# Patient Record
Sex: FEMALE | Race: WHITE | NOT HISPANIC OR LATINO | Employment: FULL TIME | URBAN - METROPOLITAN AREA
[De-identification: names, ages, dates, MRNs, and addresses within clinical notes are randomized per-mention and may not be internally consistent; named-entity substitution may affect disease eponyms.]

---

## 2017-02-01 ENCOUNTER — ALLSCRIPTS OFFICE VISIT (OUTPATIENT)
Dept: OTHER | Facility: OTHER | Age: 47
End: 2017-02-01

## 2017-02-01 LAB — S PYO AG THROAT QL: POSITIVE

## 2017-02-10 ENCOUNTER — GENERIC CONVERSION - ENCOUNTER (OUTPATIENT)
Dept: OTHER | Facility: OTHER | Age: 47
End: 2017-02-10

## 2017-04-24 ENCOUNTER — ALLSCRIPTS OFFICE VISIT (OUTPATIENT)
Dept: OTHER | Facility: OTHER | Age: 47
End: 2017-04-24

## 2017-04-24 LAB — S PYO AG THROAT QL: NEGATIVE

## 2017-04-30 ENCOUNTER — GENERIC CONVERSION - ENCOUNTER (OUTPATIENT)
Dept: OTHER | Facility: OTHER | Age: 47
End: 2017-04-30

## 2017-11-28 ENCOUNTER — GENERIC CONVERSION - ENCOUNTER (OUTPATIENT)
Dept: OTHER | Facility: OTHER | Age: 47
End: 2017-11-28

## 2018-01-10 NOTE — MISCELLANEOUS
Message   Recorded as Task   Date: 11/28/2017 03:19 PM, Created By: Ashwini Tripathi   Task Name: Medical Complaint Callback   Assigned To: [facility]   Regarding Patient: Preeti Alonso, Status: Active   CommentMahusseingiovanna Sevilla - 28 Nov 2017 3:19 PM     TASK CREATED  Caller: Self; [phone] (Home); [phone] (Work)  PT REPORTS YESTERDAY A WOKE WITH LEFT EYE BLOODSHOT NO HEADACHE NO FEVER FEELS DISCOMFORT NO PRESSURE   TAKING HER ALLEGY [address] SHE SEE AN EYE DOCTOR OR COME TO SEE YOU    PLEASE ADVISE   Zhen Woods - 28 Nov 2017 4:21 PM     TASK REPLIED TO: Previously Assigned To [address] like a subconjunctival hemorrhage (broken blood vessel in eye), but because the eye is uncomfortable recommend she f/u with eye doctor to be sure  Jerilyn Palmer - 28 Nov 2017 4:27 PM     TASK REPLIED TO: Previously Assigned To [facility]  Pt aware        Active Problems    1  Acute pharyngitis, unspecified etiology (462) (J02 9)   2  Acute right-sided low back pain without sciatica (724 2) (M54 5)   3  ADD (attention deficit disorder) (314 00) (F98 8)   4  Allergic rhinitis (477 9) (J30 9)   5  Colonoscopy (Fiberoptic) Screening   6  Elevated serum alkaline phosphatase level (790 5) (R74 8)   7  Encounter for screening mammogram for malignant neoplasm of breast (V76 12)   (Z12 31)   8  Hemochromatosis (275 03) (E83 119)   9  Strep pharyngitis (034 0) (J02 0)   10  Strep tonsillitis (034 0) (J03 00)   11  Vitamin D deficiency (268 9) (E55 9)    Current Meds   1  Amoxicillin 500 MG Oral Capsule; TAKE 2 CAPSULES TWICE DAILY UNTIL GONE;   Therapy: 97Ofu0680 to (Evaluate:57Ojg2061)  Requested for: 30Apr2017; Last   Rx:30Apr2017 Ordered   2  Amphetamine-Dextroamphet ER 30 MG Oral Capsule Extended Release 24 Hour; TAKE   1 CAPSULE Daily; Therapy: [id number] to (Evaluate:99Emp7386); Last Rx:21Nov2017 Ordered   3   Fluticasone Propionate 50 MCG/ACT Nasal Suspension; INHALE 2 SPRAYS IN EACH   NOSTRIL AT BEDTIME; Therapy: 82FGT0019 to (Evaluate:19Jun2016)  Requested for: 75Mzs9478; Last   Rx:16Kps7947; Status: ACTIVE - Renewal Denied Ordered   4  Vitamin D 2000 UNIT Oral Tablet; TAKE 1 CAPSULE Daily Recorded    Allergies    1  No Known Drug Allergies    Signatures   Electronically signed by :  NATHALIE Farmer; Nov 28 2017  4:31PM EST                       (Author)

## 2018-01-12 VITALS
DIASTOLIC BLOOD PRESSURE: 74 MMHG | HEIGHT: 72 IN | WEIGHT: 231 LBS | SYSTOLIC BLOOD PRESSURE: 126 MMHG | HEART RATE: 84 BPM | TEMPERATURE: 97.8 F | BODY MASS INDEX: 31.29 KG/M2

## 2018-01-13 VITALS
DIASTOLIC BLOOD PRESSURE: 70 MMHG | SYSTOLIC BLOOD PRESSURE: 124 MMHG | TEMPERATURE: 97.3 F | BODY MASS INDEX: 31.64 KG/M2 | HEIGHT: 72 IN | HEART RATE: 80 BPM | WEIGHT: 233.6 LBS

## 2018-01-14 NOTE — RESULT NOTES
Verified Results  (Q) CULTURE, THROAT, SPECIAL W/GRP A STREP SUSCEPT  [id number] 12:00AM Leann Patel     Test Name Result Flag Reference   CULTURE, THROAT, SPECIAL$W/GRP A STREP SUSCEPT  A    CULTURE, THROAT, SPECIAL W/GRP A STREP SUSCEPT  MICRO NUMBER:      [id number]    TEST STATUS:       FINAL    SPECIMEN SOURCE:   THROAT    SPECIMEN QUALITY:  ADEQUATE    RESULT:            Heavy growth of Group A Streptococcus isolated    COMMENT:           Normal oropharyngeal edmond also present                              Group A Strep                            ----------------                            INT   CHARMAINE     CEFEPIME               S          CEFOTAXIME             S          CEFTRIAXONE            S          CLINDAMYCIN            S          ERYTHROMYCIN           S          LEVOFLOXACIN           S          PENICILLIN             S          VANCOMYCIN             S       S=Susceptible  I=Intermediate  R=Resistant  * = Not Tested  NR = Not Reported  **NN = See Therapy Comments       Plan  Strep pharyngitis    · Start: Amoxicillin 500 MG Oral Capsule; TAKE 2 CAPSULES TWICE DAILY UNTIL GONE

## 2018-02-06 ENCOUNTER — OFFICE VISIT (OUTPATIENT)
Dept: URGENT CARE | Facility: CLINIC | Age: 48
End: 2018-02-06
Payer: COMMERCIAL

## 2018-02-06 VITALS
DIASTOLIC BLOOD PRESSURE: 70 MMHG | RESPIRATION RATE: 16 BRPM | TEMPERATURE: 97.4 F | WEIGHT: 249 LBS | BODY MASS INDEX: 33.72 KG/M2 | HEIGHT: 72 IN | HEART RATE: 74 BPM | SYSTOLIC BLOOD PRESSURE: 142 MMHG | OXYGEN SATURATION: 98 %

## 2018-02-06 DIAGNOSIS — R68.89 FLU-LIKE SYMPTOMS: Primary | ICD-10-CM

## 2018-02-06 PROCEDURE — G0382 LEV 3 HOSP TYPE B ED VISIT: HCPCS | Performed by: FAMILY MEDICINE

## 2018-02-06 PROCEDURE — 87798 DETECT AGENT NOS DNA AMP: CPT | Performed by: FAMILY MEDICINE

## 2018-02-06 RX ORDER — DEXTROAMPHETAMINE SACCHARATE, AMPHETAMINE ASPARTATE MONOHYDRATE, DEXTROAMPHETAMINE SULFATE AND AMPHETAMINE SULFATE 7.5; 7.5; 7.5; 7.5 MG/1; MG/1; MG/1; MG/1
1 CAPSULE, EXTENDED RELEASE ORAL DAILY
COMMUNITY
Start: 2014-07-23 | End: 2018-02-20 | Stop reason: SDUPTHER

## 2018-02-06 RX ORDER — FLUTICASONE PROPIONATE 50 MCG
SPRAY, SUSPENSION (ML) NASAL
COMMUNITY
Start: 2013-01-14 | End: 2018-12-03 | Stop reason: ALTCHOICE

## 2018-02-06 RX ORDER — MULTIVIT-MIN/IRON/FOLIC ACID/K 18-600-40
1 CAPSULE ORAL DAILY
COMMUNITY

## 2018-02-06 RX ORDER — OSELTAMIVIR PHOSPHATE 75 MG/1
75 CAPSULE ORAL EVERY 12 HOURS SCHEDULED
Qty: 10 CAPSULE | Refills: 0 | Status: SHIPPED | OUTPATIENT
Start: 2018-02-06 | End: 2018-02-11

## 2018-02-06 NOTE — PROGRESS NOTES
Assessment/Plan:    No problem-specific Assessment & Plan notes found for this encounter  Diagnoses and all orders for this visit:    Flu-like symptoms  -     Influenza culture  -     oseltamivir (TAMIFLU) 75 mg capsule; Take 1 capsule (75 mg total) by mouth every 12 (twelve) hours for 5 days    Other orders  -     amphetamine-dextroamphetamine (ADDERALL XR) 30 MG 24 hr capsule; Take 1 capsule by mouth daily  -     fluticasone (FLONASE) 50 mcg/act nasal spray; into each nostril  -     Cholecalciferol (VITAMIN D) 2000 units CAPS; Take 1 capsule by mouth daily          Subjective:      Patient ID: Gerhardt Hoe is a 51 y o  female  Patient presents with 24 hours of sinus congestion, dry cough, sore throat, progressive body aches  She denies fevers, she does have chills  Several coworkers and her boss were diagnosed with the flu  She did not receive a flu vaccination this year  She denies chest tightness shortness of breath or wheezing        The following portions of the patient's history were reviewed and updated as appropriate: current medications, past family history, past medical history, past social history, past surgical history and problem list     Review of Systems   Constitutional: Positive for chills and fatigue  Negative for fever  HENT: Positive for congestion, rhinorrhea and sore throat  Eyes: Negative  Respiratory: Positive for cough  Negative for chest tightness, shortness of breath and wheezing  Cardiovascular: Negative  Musculoskeletal: Positive for myalgias  Objective:     Physical Exam   Constitutional: She appears well-developed and well-nourished  HENT:   Head: Normocephalic  Right Ear: External ear normal    Left Ear: External ear normal    Mouth/Throat: Oropharynx is clear and moist  No oropharyngeal exudate  Mucosas erythema, edema and rhinorrhea, PND  Eyes: Conjunctivae are normal    Neck: Neck supple     Cardiovascular: Normal rate, regular rhythm and normal heart sounds  Pulmonary/Chest: Effort normal and breath sounds normal  No respiratory distress  She has no wheezes  Lymphadenopathy:     She has no cervical adenopathy

## 2018-02-06 NOTE — LETTER
February 6, 2018     Patient: Linda May   YOB: 1970   Date of Visit: 2/6/2018       To Whom it May Concern:    Florentino Cazares was seen in my clinic on 2/6/2018  She may return to work on 2/8/2018  If you have any questions or concerns, please don't hesitate to call           Sincerely,          Jamal Ravi DO        CC: No Recipients

## 2018-02-06 NOTE — PATIENT INSTRUCTIONS
Empiric Tamiflu pending influenza culture results  Tylenol or Motrin as needed for temperatures comfort  Encourage fluids, humidifier use

## 2018-02-07 ENCOUNTER — TELEPHONE (OUTPATIENT)
Dept: URGENT CARE | Facility: CLINIC | Age: 48
End: 2018-02-07

## 2018-02-07 LAB
FLUAV AG SPEC QL: NORMAL
FLUBV AG SPEC QL: NORMAL
RSV B RNA SPEC QL NAA+PROBE: NORMAL

## 2018-02-20 DIAGNOSIS — F98.8 ATTENTION DEFICIT DISORDER (ADD) WITHOUT HYPERACTIVITY: Primary | ICD-10-CM

## 2018-02-20 RX ORDER — DEXTROAMPHETAMINE SACCHARATE, AMPHETAMINE ASPARTATE MONOHYDRATE, DEXTROAMPHETAMINE SULFATE AND AMPHETAMINE SULFATE 7.5; 7.5; 7.5; 7.5 MG/1; MG/1; MG/1; MG/1
30 CAPSULE, EXTENDED RELEASE ORAL DAILY
Qty: 30 CAPSULE | Refills: 0 | Status: SHIPPED | OUTPATIENT
Start: 2018-02-20 | End: 2018-03-20 | Stop reason: SDUPTHER

## 2018-03-20 DIAGNOSIS — F98.8 ATTENTION DEFICIT DISORDER (ADD) WITHOUT HYPERACTIVITY: ICD-10-CM

## 2018-03-20 RX ORDER — DEXTROAMPHETAMINE SACCHARATE, AMPHETAMINE ASPARTATE MONOHYDRATE, DEXTROAMPHETAMINE SULFATE AND AMPHETAMINE SULFATE 7.5; 7.5; 7.5; 7.5 MG/1; MG/1; MG/1; MG/1
30 CAPSULE, EXTENDED RELEASE ORAL DAILY
Qty: 30 CAPSULE | Refills: 0 | Status: SHIPPED | OUTPATIENT
Start: 2018-03-20 | End: 2018-04-20 | Stop reason: SDUPTHER

## 2018-04-05 ENCOUNTER — OFFICE VISIT (OUTPATIENT)
Dept: FAMILY MEDICINE CLINIC | Facility: HOSPITAL | Age: 48
End: 2018-04-05
Payer: COMMERCIAL

## 2018-04-05 VITALS
BODY MASS INDEX: 33.7 KG/M2 | WEIGHT: 248.8 LBS | HEIGHT: 72 IN | HEART RATE: 78 BPM | TEMPERATURE: 97.5 F | SYSTOLIC BLOOD PRESSURE: 126 MMHG | DIASTOLIC BLOOD PRESSURE: 78 MMHG

## 2018-04-05 DIAGNOSIS — R63.5 WEIGHT GAIN: ICD-10-CM

## 2018-04-05 DIAGNOSIS — Z00.00 WELL ADULT EXAM: ICD-10-CM

## 2018-04-05 DIAGNOSIS — Z12.31 ENCOUNTER FOR SCREENING MAMMOGRAM FOR BREAST CANCER: Primary | ICD-10-CM

## 2018-04-05 DIAGNOSIS — Z13.29 SCREENING FOR ENDOCRINE, METABOLIC AND IMMUNITY DISORDER: ICD-10-CM

## 2018-04-05 DIAGNOSIS — Z13.220 SCREENING CHOLESTEROL LEVEL: ICD-10-CM

## 2018-04-05 DIAGNOSIS — Z13.0 SCREENING FOR ENDOCRINE, METABOLIC AND IMMUNITY DISORDER: ICD-10-CM

## 2018-04-05 DIAGNOSIS — Z13.228 SCREENING FOR ENDOCRINE, METABOLIC AND IMMUNITY DISORDER: ICD-10-CM

## 2018-04-05 DIAGNOSIS — E83.118 OTHER HEMOCHROMATOSIS: ICD-10-CM

## 2018-04-05 PROCEDURE — 99396 PREV VISIT EST AGE 40-64: CPT | Performed by: NURSE PRACTITIONER

## 2018-04-05 NOTE — PROGRESS NOTES
Subjective     Geraldine Martinez is a 51 y o   female and is here for routine health maintenance  The patient reports problems - Having issues with gaining weight  Discussed with GYN that she has only had 2 periods in the last year and when she does get it she will gain 10 pounds that she can then not take off  Reports a well balanced diet and exercsie almost daily  Walks and active with grandchildren  Fredrick Francis Has hemochromatosis  Was previously managed by hematologist in [facility] but he is no longer there  Requesting name of new hematologist      Cancer Screening  Colononoscopy 2016  Repeat 5 years  Mammogram 2017  Has script  Pap 2017  Abnormal pap? No    CVD Screening  Diet well balanced  Smoking No  Hypertension No  Dyslipidemia No  Obesity No  Physical activity daily   Diabetes mellitus No    Immunizations  Influenza No  Prevnar N/A  Pneumovax N/A  TDAP 2013    Immunization History   Administered Date(s) Administered    Influenza TIV (IM) 10/12/2015    Tdap 08/01/2013        History:  LMP: 2/2018    Dentist Visit within 6-12 months      The following portions of the patient's history were reviewed and updated as appropriate: allergies, current medications, past family history, past medical history, past social history, past surgical history and problem list     HPI    Review of Systems  Review of Systems   Constitutional: Positive for unexpected weight change  Negative for chills, fatigue and fever  HENT: Negative for congestion, dental problem, ear pain, hearing loss, postnasal drip, rhinorrhea, sinus pain, sinus pressure, sore throat, tinnitus and trouble swallowing  Eyes: Negative  Respiratory: Negative  Cardiovascular: Negative  Gastrointestinal: Positive for constipation and diarrhea  Genitourinary: Negative  Musculoskeletal: Negative  Skin: Negative  Allergic/Immunologic: Negative  Neurological: Negative  Psychiatric/Behavioral: Negative            Objective Vitals:    04/05/18 0842   BP: 126/78   Pulse: 78   Temp: 97 5 °F (36 4 °C)     Physical Exam   Constitutional: She is oriented to person, place, and time  She appears well-developed and well-nourished  HENT:   Head: Normocephalic and atraumatic  Right Ear: External ear normal    Left Ear: External ear normal    Nose: Nose normal    Mouth/Throat: Oropharynx is clear and moist    Eyes: Conjunctivae are normal  Pupils are equal, round, and reactive to light  Neck: Normal range of motion  Neck supple  No thyromegaly present  Cardiovascular: Normal rate, regular rhythm, normal heart sounds and intact distal pulses  Pulmonary/Chest: Effort normal and breath sounds normal    Abdominal: Soft  Bowel sounds are normal    Musculoskeletal: Normal range of motion  Neurological: She is alert and oriented to person, place, and time  Skin: Skin is warm and dry  Capillary refill takes less than 2 seconds  Psychiatric: She has a normal mood and affect  Vitals reviewed  Assessment/Plan     Healthy female exam     1  Resources for weight loss given  2  Patient Counseling:  --Nutrition: Stressed importance of moderation in sodium/caffeine intake, saturated fat and cholesterol, caloric balance, sufficient intake of fresh fruits, vegetables, fiber, calcium, iron  --Exercise: Stressed the importance of regular exercise  --Injury prevention: Discussed safety belts, safety helmets, smoke detector, smoking near bedding or upholstery  --Dental health: Discussed importance of regular tooth brushing, flossing, and dental visits  --Immunizations reviewed  --Discussed benefits of screening colonoscopy  3  Cancer Screening Colonoscopy every 5 years  Pap every 3 years  mammo every year  4  Labs: Screening labs and will also check iron levels  5  Recommend dermatologist for skin check yearly  6  Follow up next physical in 1 year

## 2018-04-07 LAB
ALBUMIN SERPL-MCNC: 4.1 G/DL (ref 3.6–5.1)
ALBUMIN/GLOB SERPL: 1.5 (CALC) (ref 1–2.5)
ALP SERPL-CCNC: 118 U/L (ref 33–115)
ALT SERPL-CCNC: 16 U/L (ref 6–29)
AST SERPL-CCNC: 16 U/L (ref 10–35)
BASOPHILS # BLD AUTO: 21 CELLS/UL (ref 0–200)
BASOPHILS NFR BLD AUTO: 0.4 %
BILIRUB SERPL-MCNC: 1 MG/DL (ref 0.2–1.2)
BUN SERPL-MCNC: 14 MG/DL (ref 7–25)
BUN/CREAT SERPL: ABNORMAL (CALC) (ref 6–22)
CALCIUM SERPL-MCNC: 9.2 MG/DL (ref 8.6–10.2)
CHLORIDE SERPL-SCNC: 106 MMOL/L (ref 98–110)
CHOLEST SERPL-MCNC: 148 MG/DL
CHOLEST/HDLC SERPL: 2.6 (CALC)
CO2 SERPL-SCNC: 29 MMOL/L (ref 20–31)
CREAT SERPL-MCNC: 0.68 MG/DL (ref 0.5–1.1)
EOSINOPHIL # BLD AUTO: 133 CELLS/UL (ref 15–500)
EOSINOPHIL NFR BLD AUTO: 2.5 %
ERYTHROCYTE [DISTWIDTH] IN BLOOD BY AUTOMATED COUNT: 11.8 % (ref 11–15)
FERRITIN SERPL-MCNC: 107 NG/ML (ref 10–232)
GLOBULIN SER CALC-MCNC: 2.8 G/DL (CALC) (ref 1.9–3.7)
GLUCOSE SERPL-MCNC: 94 MG/DL (ref 65–99)
HCT VFR BLD AUTO: 44.9 % (ref 35–45)
HDLC SERPL-MCNC: 57 MG/DL
HGB BLD-MCNC: 15.2 G/DL (ref 11.7–15.5)
IRON SERPL-MCNC: 154 MCG/DL (ref 40–190)
LDLC SERPL CALC-MCNC: 75 MG/DL (CALC)
LYMPHOCYTES # BLD AUTO: 1977 CELLS/UL (ref 850–3900)
LYMPHOCYTES NFR BLD AUTO: 37.3 %
MCH RBC QN AUTO: 32.3 PG (ref 27–33)
MCHC RBC AUTO-ENTMCNC: 33.9 G/DL (ref 32–36)
MCV RBC AUTO: 95.3 FL (ref 80–100)
MONOCYTES # BLD AUTO: 382 CELLS/UL (ref 200–950)
MONOCYTES NFR BLD AUTO: 7.2 %
NEUTROPHILS # BLD AUTO: 2788 CELLS/UL (ref 1500–7800)
NEUTROPHILS NFR BLD AUTO: 52.6 %
NONHDLC SERPL-MCNC: 91 MG/DL (CALC)
PLATELET # BLD AUTO: 200 THOUSAND/UL (ref 140–400)
PMV BLD REES-ECKER: 10.4 FL (ref 7.5–12.5)
POTASSIUM SERPL-SCNC: 4.1 MMOL/L (ref 3.5–5.3)
PROT SERPL-MCNC: 6.9 G/DL (ref 6.1–8.1)
RBC # BLD AUTO: 4.71 MILLION/UL (ref 3.8–5.1)
SL AMB EGFR AFRICAN AMERICAN: 119 ML/MIN/1.73M2
SL AMB EGFR NON AFRICAN AMERICAN: 103 ML/MIN/1.73M2
SODIUM SERPL-SCNC: 139 MMOL/L (ref 135–146)
TRIGL SERPL-MCNC: 75 MG/DL
TSH SERPL-ACNC: 1.61 MIU/L
WBC # BLD AUTO: 5.3 THOUSAND/UL (ref 3.8–10.8)

## 2018-04-20 DIAGNOSIS — F98.8 ATTENTION DEFICIT DISORDER (ADD) WITHOUT HYPERACTIVITY: ICD-10-CM

## 2018-04-20 RX ORDER — DEXTROAMPHETAMINE SACCHARATE, AMPHETAMINE ASPARTATE MONOHYDRATE, DEXTROAMPHETAMINE SULFATE AND AMPHETAMINE SULFATE 7.5; 7.5; 7.5; 7.5 MG/1; MG/1; MG/1; MG/1
30 CAPSULE, EXTENDED RELEASE ORAL DAILY
Qty: 30 CAPSULE | Refills: 0 | Status: SHIPPED | OUTPATIENT
Start: 2018-04-20 | End: 2018-05-22 | Stop reason: SDUPTHER

## 2018-05-09 ENCOUNTER — OFFICE VISIT (OUTPATIENT)
Dept: HEMATOLOGY ONCOLOGY | Facility: HOSPITAL | Age: 48
End: 2018-05-09
Payer: COMMERCIAL

## 2018-05-09 VITALS
BODY MASS INDEX: 34.86 KG/M2 | OXYGEN SATURATION: 98 % | TEMPERATURE: 98.4 F | HEIGHT: 71 IN | DIASTOLIC BLOOD PRESSURE: 88 MMHG | SYSTOLIC BLOOD PRESSURE: 126 MMHG | RESPIRATION RATE: 16 BRPM | WEIGHT: 249 LBS | HEART RATE: 74 BPM

## 2018-05-09 DIAGNOSIS — E83.118 OTHER HEMOCHROMATOSIS: Primary | ICD-10-CM

## 2018-05-09 PROCEDURE — 99205 OFFICE O/P NEW HI 60 MIN: CPT | Performed by: INTERNAL MEDICINE

## 2018-05-09 RX ORDER — CETIRIZINE HYDROCHLORIDE 10 MG/1
10 TABLET ORAL DAILY
COMMUNITY

## 2018-05-09 NOTE — LETTER
May 9, 2018     MD Danuta Soni  [address]  [address] [address] [address]    Patient: Jakob Caruso   YOB: 1970   Date of Visit: 5/9/2018       Dear Dr Narendra Bryan: Thank you for referring Flor Chapman to me for evaluation  Below are my notes for this consultation  If you have questions, please do not hesitate to call me  I look forward to following your patient along with you  Sincerely,        Shorty Toro MD        CC: No Recipients  Shorty Toro MD  5/9/2018  3:03 PM  Sign at close encounter     Oncology Outpatient Consult Note  Jakob Caruso 51 y o  female MRN: @ Encounter: [id number]        Date:  5/9/2018        CC:  History of hemochromatosis      HPI:  Jakob Caruso is seen for initial consultation 5/9/2018 regarding Hemochromatosis  The patient rarely has a family history of hemochromatosis and was tested and proven to have it  She was on a phlebotomy schedule but then was slowly weaned down where she was only giving occasionally  She was seeing a hematologist every other year  She states Her children were tested and are also positive  Her sister and her brother both are also positive  She has not been getting phlebotomies for a while now  Her last hematologist moved so she decided to come to see us  As far symptoms of discharge is at baseline  Denies any nausea denies any vomiting and is in shortness of breath  The rest of her 14 point review of systems today was negative  She has never had an MRI of her liver so I will arrange this  Previous Hematologic/ Oncologic History:    Phlebotomy    Test Results:    Imaging: No results found      Labs:   Lab Results   Component Value Date    WBC 5 29 06/04/2014    HGB 15 2 04/06/2018    HCT 44 9 04/06/2018    MCV 95 3 04/06/2018     04/06/2018     Lab Results   Component Value Date     06/04/2014    K 4 1 06/04/2014     06/04/2014    CO2 31 06/04/2014    ANIONGAP 1 (L) 06/04/2014 BUN 14 04/06/2018    CREATININE 0 68 04/06/2018    GLUCOSE 89 06/04/2014    CALCIUM 9 2 04/06/2018    AST 18 06/04/2014    ALT 21 06/04/2014    ALKPHOS 126 06/04/2014    PROT 6 9 06/04/2014    BILITOT 1 0 06/04/2014           Lab Results   Component Value Date    IRON 154 04/06/2018    TIBC 234 (L) 06/04/2014    FERRITIN 107 04/06/2018         ROS: As stated in history of present illness otherwise her 14 point review of systems today was negative  Active Problems:   Patient Active Problem List   Diagnosis    ADD (attention deficit disorder)    Allergic rhinitis    Other hemochromatosis       Past Medical History:   Past Medical History:   Diagnosis Date    Hemochromatosis     IBS (irritable bowel syndrome)        Surgical History:   Past Surgical History:   Procedure Laterality Date    COLONOSCOPY      resolved 02/13;  repeat due in 10 years        Family History:  No family history on file  Cancer-related family history is not on file  Social History:   Social History     Social History    Marital status: /Civil Union     Spouse name: N/A    Number of children: N/A    Years of education: N/A     Occupational History    Not on file       Social History Main Topics    Smoking status: Former Smoker    Smokeless tobacco: Never Used    Alcohol use No    Drug use: No    Sexual activity: Not on file     Other Topics Concern    Not on file     Social History Narrative    No narrative on file       Current Medications:   Current Outpatient Prescriptions   Medication Sig Dispense Refill    amphetamine-dextroamphetamine (ADDERALL XR) 30 MG 24 hr capsule Take 1 capsule (30 mg total) by mouth daily Max Daily Amount: 30 mg 30 capsule 0    cetirizine (ZyrTEC) 10 mg tablet Take 10 mg by mouth daily      Cholecalciferol (VITAMIN D) 2000 units CAPS Take 1 capsule by mouth daily      fluticasone (FLONASE) 50 mcg/act nasal spray into each nostril       No current facility-administered medications for this visit  Allergies: No Known Allergies      Physical Exam:    Body surface area is 2 32 meters squared  Wt Readings from Last 3 Encounters:   05/09/18 113 kg (249 lb)   04/05/18 113 kg (248 lb 12 8 oz)   02/06/18 113 kg (249 lb)        Temp Readings from Last 3 Encounters:   05/09/18 98 4 °F (36 9 °C) (Tympanic)   04/05/18 97 5 °F (36 4 °C) (Tympanic)   02/06/18 (!) 97 4 °F (36 3 °C)        BP Readings from Last 3 Encounters:   05/09/18 126/88   04/05/18 126/78   02/06/18 142/70         Pulse Readings from Last 3 Encounters:   05/09/18 74   04/05/18 78   02/06/18 74       Physical Exam     Constitutional   General appearance: No acute distress, well appearing and well nourished  Eyes   Conjunctiva and lids: No swelling, erythema or discharge  Pupils and irises: Equal, round and reactive to light  Ears, Nose, Mouth, and Throat   External inspection of ears and nose: Normal     Nasal mucosa, septum, and turbinates: Normal without edema or erythema  Oropharynx: Normal with no erythema, edema, exudate or lesions  Pulmonary   Respiratory effort: No increased work of breathing or signs of respiratory distress  Auscultation of lungs: Clear to auscultation  Cardiovascular   Palpation of heart: Normal PMI, no thrills  Auscultation of heart: Normal rate and rhythm, normal S1 and S2, without murmurs  Examination of extremities for edema and/or varicosities: Normal     Carotid pulses: Normal     Abdomen   Abdomen: Non-tender, no masses  Liver and spleen: No hepatomegaly or splenomegaly  Lymphatic   Palpation of lymph nodes in neck: No lymphadenopathy  Musculoskeletal   Gait and station: Normal     Digits and nails: Normal without clubbing or cyanosis  Inspection/palpation of joints, bones, and muscles: Normal     Skin   Skin and subcutaneous tissue: Normal without rashes or lesions  Neurologic   Cranial nerves: Cranial nerves 2-12 intact      Sensation: No sensory loss  Psychiatric   Orientation to person, place, and time: Normal     Mood and affect: Normal           Assessment/ Plan:      The patient is a pleasant 58-year-old female with a past medical history of Hereditary hemochromatosis  She was on phlebotomy previously but has not had one for more than a few months  Her last iron levels show a Ferritin of 107  I advised her she needs to restart Her phlebotomy and she agreed  She will go to [facility] blood bank and have a unit of blood removed every 2 months  I'll see her back in 4-5 months with repeat blood work  I will also order an MRI of her liver as she has never had one to get a baseline and check for iron deposition  I'll see her back in 4 months  Until then if she has any questions she will call our office  Goals and Barriers:  Current Goal:  Prolong Survival from Hereditary hemochromatosis  Barriers: None  Patient's Capacity to Self Care:  Patient able to self care  Portions of the record may have been created with voice recognition software   Occasional wrong word or "sound a like" substitutions may have occurred due to the inherent limitations of voice recognition software   Read the chart carefully and recognize, using context, where substitutions have occurred

## 2018-05-09 NOTE — PROGRESS NOTES
Oncology Outpatient Consult Note  Nicole Howard 51 y o  female MRN: @ Encounter: [id number]        Date:  5/9/2018        CC:  History of hemochromatosis      HPI:  Nicole Howard is seen for initial consultation 5/9/2018 regarding Hemochromatosis  The patient rarely has a family history of hemochromatosis and was tested and proven to have it  She was on a phlebotomy schedule but then was slowly weaned down where she was only giving occasionally  She was seeing a hematologist every other year  She states Her children were tested and are also positive  Her sister and her brother both are also positive  She has not been getting phlebotomies for a while now  Her last hematologist moved so she decided to come to see us  As far symptoms of discharge is at baseline  Denies any nausea denies any vomiting and is in shortness of breath  The rest of her 14 point review of systems today was negative  She has never had an MRI of her liver so I will arrange this  Previous Hematologic/ Oncologic History:    Phlebotomy    Test Results:    Imaging: No results found  Labs:   Lab Results   Component Value Date    WBC 5 29 06/04/2014    HGB 15 2 04/06/2018    HCT 44 9 04/06/2018    MCV 95 3 04/06/2018     04/06/2018     Lab Results   Component Value Date     06/04/2014    K 4 1 06/04/2014     06/04/2014    CO2 31 06/04/2014    ANIONGAP 1 (L) 06/04/2014    BUN 14 04/06/2018    CREATININE 0 68 04/06/2018    GLUCOSE 89 06/04/2014    CALCIUM 9 2 04/06/2018    AST 18 06/04/2014    ALT 21 06/04/2014    ALKPHOS 126 06/04/2014    PROT 6 9 06/04/2014    BILITOT 1 0 06/04/2014           Lab Results   Component Value Date    IRON 154 04/06/2018    TIBC 234 (L) 06/04/2014    FERRITIN 107 04/06/2018         ROS: As stated in history of present illness otherwise her 14 point review of systems today was negative          Active Problems:   Patient Active Problem List   Diagnosis    ADD (attention deficit disorder)    Allergic rhinitis    Other hemochromatosis       Past Medical History:   Past Medical History:   Diagnosis Date    Hemochromatosis     IBS (irritable bowel syndrome)        Surgical History:   Past Surgical History:   Procedure Laterality Date    COLONOSCOPY      resolved 02/13;  repeat due in 10 years        Family History:  No family history on file  Cancer-related family history is not on file  Social History:   Social History     Social History    Marital status: /Civil Union     Spouse name: N/A    Number of children: N/A    Years of education: N/A     Occupational History    Not on file  Social History Main Topics    Smoking status: Former Smoker    Smokeless tobacco: Never Used    Alcohol use No    Drug use: No    Sexual activity: Not on file     Other Topics Concern    Not on file     Social History Narrative    No narrative on file       Current Medications:   Current Outpatient Prescriptions   Medication Sig Dispense Refill    amphetamine-dextroamphetamine (ADDERALL XR) 30 MG 24 hr capsule Take 1 capsule (30 mg total) by mouth daily Max Daily Amount: 30 mg 30 capsule 0    cetirizine (ZyrTEC) 10 mg tablet Take 10 mg by mouth daily      Cholecalciferol (VITAMIN D) 2000 units CAPS Take 1 capsule by mouth daily      fluticasone (FLONASE) 50 mcg/act nasal spray into each nostril       No current facility-administered medications for this visit  Allergies: No Known Allergies      Physical Exam:    Body surface area is 2 32 meters squared      Wt Readings from Last 3 Encounters:   05/09/18 113 kg (249 lb)   04/05/18 113 kg (248 lb 12 8 oz)   02/06/18 113 kg (249 lb)        Temp Readings from Last 3 Encounters:   05/09/18 98 4 °F (36 9 °C) (Tympanic)   04/05/18 97 5 °F (36 4 °C) (Tympanic)   02/06/18 (!) 97 4 °F (36 3 °C)        BP Readings from Last 3 Encounters:   05/09/18 126/88   04/05/18 126/78   02/06/18 142/70         Pulse Readings from Last 3 Encounters:   05/09/18 74   04/05/18 78   02/06/18 74       Physical Exam     Constitutional   General appearance: No acute distress, well appearing and well nourished  Eyes   Conjunctiva and lids: No swelling, erythema or discharge  Pupils and irises: Equal, round and reactive to light  Ears, Nose, Mouth, and Throat   External inspection of ears and nose: Normal     Nasal mucosa, septum, and turbinates: Normal without edema or erythema  Oropharynx: Normal with no erythema, edema, exudate or lesions  Pulmonary   Respiratory effort: No increased work of breathing or signs of respiratory distress  Auscultation of lungs: Clear to auscultation  Cardiovascular   Palpation of heart: Normal PMI, no thrills  Auscultation of heart: Normal rate and rhythm, normal S1 and S2, without murmurs  Examination of extremities for edema and/or varicosities: Normal     Carotid pulses: Normal     Abdomen   Abdomen: Non-tender, no masses  Liver and spleen: No hepatomegaly or splenomegaly  Lymphatic   Palpation of lymph nodes in neck: No lymphadenopathy  Musculoskeletal   Gait and station: Normal     Digits and nails: Normal without clubbing or cyanosis  Inspection/palpation of joints, bones, and muscles: Normal     Skin   Skin and subcutaneous tissue: Normal without rashes or lesions  Neurologic   Cranial nerves: Cranial nerves 2-12 intact  Sensation: No sensory loss  Psychiatric   Orientation to person, place, and time: Normal     Mood and affect: Normal           Assessment/ Plan:      The patient is a pleasant 26-year-old female with a past medical history of Hereditary hemochromatosis  She was on phlebotomy previously but has not had one for more than a few months  Her last iron levels show a Ferritin of 107  I advised her she needs to restart Her phlebotomy and she agreed  She [place] [facility] [place] and have a unit of blood removed every 2 months   I'll see her back in 4-5 months with repeat blood work  I will also order an MRI of her liver as she has never had one to get a baseline and check for iron deposition  I'll see her back in 4 months  Until then if she has any questions she will call our office  Goals and Barriers:  Current Goal:  Prolong Survival from Hereditary hemochromatosis  Barriers: None  Patient's Capacity to Self Care:  Patient able to self care  Portions of the record may have been created with voice recognition software   Occasional wrong word or "sound a like" substitutions may have occurred due to the inherent limitations of voice recognition software   Read the chart carefully and recognize, using context, where substitutions have occurred

## 2018-05-17 ENCOUNTER — HOSPITAL ENCOUNTER (OUTPATIENT)
Dept: MRI IMAGING | Facility: HOSPITAL | Age: 48
Discharge: HOME | End: 2018-05-17
Attending: INTERNAL MEDICINE
Payer: COMMERCIAL

## 2018-05-17 DIAGNOSIS — E83.118 OTHER HEMOCHROMATOSIS: ICD-10-CM

## 2018-05-17 PROCEDURE — A9585 GADOBUTROL INJECTION: HCPCS | Performed by: INTERNAL MEDICINE

## 2018-05-17 PROCEDURE — 74183 MRI ABD W/O CNTR FLWD CNTR: CPT

## 2018-05-17 RX ADMIN — GADOBUTROL 11 ML: 604.72 INJECTION INTRAVENOUS at 09:31

## 2018-05-21 DIAGNOSIS — F98.8 ATTENTION DEFICIT DISORDER (ADD) WITHOUT HYPERACTIVITY: ICD-10-CM

## 2018-05-21 RX ORDER — DEXTROAMPHETAMINE SACCHARATE, AMPHETAMINE ASPARTATE MONOHYDRATE, DEXTROAMPHETAMINE SULFATE AND AMPHETAMINE SULFATE 7.5; 7.5; 7.5; 7.5 MG/1; MG/1; MG/1; MG/1
30 CAPSULE, EXTENDED RELEASE ORAL DAILY
Qty: 30 CAPSULE | Refills: 0 | OUTPATIENT
Start: 2018-05-21

## 2018-05-22 DIAGNOSIS — F98.8 ATTENTION DEFICIT DISORDER (ADD) WITHOUT HYPERACTIVITY: ICD-10-CM

## 2018-05-22 RX ORDER — DEXTROAMPHETAMINE SACCHARATE, AMPHETAMINE ASPARTATE MONOHYDRATE, DEXTROAMPHETAMINE SULFATE AND AMPHETAMINE SULFATE 7.5; 7.5; 7.5; 7.5 MG/1; MG/1; MG/1; MG/1
30 CAPSULE, EXTENDED RELEASE ORAL DAILY
Qty: 30 CAPSULE | Refills: 0 | Status: SHIPPED | OUTPATIENT
Start: 2018-05-22 | End: 2018-06-20 | Stop reason: SDUPTHER

## 2018-06-20 DIAGNOSIS — F98.8 ATTENTION DEFICIT DISORDER (ADD) WITHOUT HYPERACTIVITY: ICD-10-CM

## 2018-06-20 RX ORDER — DEXTROAMPHETAMINE SACCHARATE, AMPHETAMINE ASPARTATE MONOHYDRATE, DEXTROAMPHETAMINE SULFATE AND AMPHETAMINE SULFATE 7.5; 7.5; 7.5; 7.5 MG/1; MG/1; MG/1; MG/1
30 CAPSULE, EXTENDED RELEASE ORAL DAILY
Qty: 30 CAPSULE | Refills: 0 | Status: SHIPPED | OUTPATIENT
Start: 2018-06-20 | End: 2018-07-17 | Stop reason: SDUPTHER

## 2018-06-20 RX ORDER — DEXTROAMPHETAMINE SACCHARATE, AMPHETAMINE ASPARTATE MONOHYDRATE, DEXTROAMPHETAMINE SULFATE AND AMPHETAMINE SULFATE 7.5; 7.5; 7.5; 7.5 MG/1; MG/1; MG/1; MG/1
30 CAPSULE, EXTENDED RELEASE ORAL DAILY
Qty: 30 CAPSULE | Refills: 0 | OUTPATIENT
Start: 2018-06-20

## 2018-06-20 NOTE — TELEPHONE ENCOUNTER
I only see this pt for health maintenance  I have never seen her for ADHD nor do I remember ever prescribing this med for her  I think Dr Feliz Hernandes may fill

## 2018-07-17 DIAGNOSIS — F98.8 ATTENTION DEFICIT DISORDER (ADD) WITHOUT HYPERACTIVITY: ICD-10-CM

## 2018-07-17 RX ORDER — DEXTROAMPHETAMINE SACCHARATE, AMPHETAMINE ASPARTATE MONOHYDRATE, DEXTROAMPHETAMINE SULFATE AND AMPHETAMINE SULFATE 7.5; 7.5; 7.5; 7.5 MG/1; MG/1; MG/1; MG/1
30 CAPSULE, EXTENDED RELEASE ORAL DAILY
Qty: 30 CAPSULE | Refills: 0 | Status: SHIPPED | OUTPATIENT
Start: 2018-07-17 | End: 2018-08-17 | Stop reason: SDUPTHER

## 2018-08-17 DIAGNOSIS — F98.8 ATTENTION DEFICIT DISORDER (ADD) WITHOUT HYPERACTIVITY: ICD-10-CM

## 2018-08-17 RX ORDER — DEXTROAMPHETAMINE SACCHARATE, AMPHETAMINE ASPARTATE MONOHYDRATE, DEXTROAMPHETAMINE SULFATE AND AMPHETAMINE SULFATE 7.5; 7.5; 7.5; 7.5 MG/1; MG/1; MG/1; MG/1
30 CAPSULE, EXTENDED RELEASE ORAL DAILY
Qty: 30 CAPSULE | Refills: 0 | Status: SHIPPED | OUTPATIENT
Start: 2018-08-17 | End: 2018-09-17 | Stop reason: SDUPTHER

## 2018-09-17 ENCOUNTER — OFFICE VISIT (OUTPATIENT)
Dept: HEMATOLOGY ONCOLOGY | Facility: HOSPITAL | Age: 48
End: 2018-09-17
Payer: COMMERCIAL

## 2018-09-17 VITALS
RESPIRATION RATE: 16 BRPM | HEART RATE: 72 BPM | BODY MASS INDEX: 34.86 KG/M2 | WEIGHT: 249 LBS | SYSTOLIC BLOOD PRESSURE: 122 MMHG | TEMPERATURE: 98.3 F | HEIGHT: 71 IN | DIASTOLIC BLOOD PRESSURE: 76 MMHG | OXYGEN SATURATION: 98 %

## 2018-09-17 DIAGNOSIS — F98.8 ATTENTION DEFICIT DISORDER (ADD) WITHOUT HYPERACTIVITY: ICD-10-CM

## 2018-09-17 DIAGNOSIS — E83.118 OTHER HEMOCHROMATOSIS: Primary | ICD-10-CM

## 2018-09-17 LAB
ALBUMIN SERPL-MCNC: 4 G/DL (ref 3.6–5.1)
ALBUMIN/GLOB SERPL: 1.3 (CALC) (ref 1–2.5)
ALP SERPL-CCNC: 113 U/L (ref 33–115)
ALT SERPL-CCNC: 19 U/L (ref 6–29)
AST SERPL-CCNC: 21 U/L (ref 10–35)
BASOPHILS # BLD AUTO: 7 CELLS/UL (ref 0–200)
BASOPHILS NFR BLD AUTO: 0.1 %
BILIRUB SERPL-MCNC: 0.9 MG/DL (ref 0.2–1.2)
BUN SERPL-MCNC: 12 MG/DL (ref 7–25)
BUN/CREAT SERPL: ABNORMAL (CALC) (ref 6–22)
CALCIUM SERPL-MCNC: 9.4 MG/DL (ref 8.6–10.2)
CHLORIDE SERPL-SCNC: 104 MMOL/L (ref 98–110)
CO2 SERPL-SCNC: 30 MMOL/L (ref 20–32)
CREAT SERPL-MCNC: 0.74 MG/DL (ref 0.5–1.1)
EOSINOPHIL # BLD AUTO: 99 CELLS/UL (ref 15–500)
EOSINOPHIL NFR BLD AUTO: 1.4 %
ERYTHROCYTE [DISTWIDTH] IN BLOOD BY AUTOMATED COUNT: 11.8 % (ref 11–15)
FERRITIN SERPL-MCNC: 68 NG/ML (ref 10–232)
GLOBULIN SER CALC-MCNC: 3 G/DL (CALC) (ref 1.9–3.7)
GLUCOSE SERPL-MCNC: 111 MG/DL (ref 65–99)
HCT VFR BLD AUTO: 45 % (ref 35–45)
HGB BLD-MCNC: 15.3 G/DL (ref 11.7–15.5)
IRON SATN MFR SERPL: 41 % (CALC) (ref 11–50)
IRON SERPL-MCNC: 106 MCG/DL (ref 40–190)
LYMPHOCYTES # BLD AUTO: 2137 CELLS/UL (ref 850–3900)
LYMPHOCYTES NFR BLD AUTO: 30.1 %
MCH RBC QN AUTO: 32.6 PG (ref 27–33)
MCHC RBC AUTO-ENTMCNC: 34 G/DL (ref 32–36)
MCV RBC AUTO: 95.7 FL (ref 80–100)
MONOCYTES # BLD AUTO: 419 CELLS/UL (ref 200–950)
MONOCYTES NFR BLD AUTO: 5.9 %
NEUTROPHILS # BLD AUTO: 4438 CELLS/UL (ref 1500–7800)
NEUTROPHILS NFR BLD AUTO: 62.5 %
PLATELET # BLD AUTO: 209 THOUSAND/UL (ref 140–400)
PMV BLD REES-ECKER: 10.7 FL (ref 7.5–12.5)
POTASSIUM SERPL-SCNC: 4 MMOL/L (ref 3.5–5.3)
PROT SERPL-MCNC: 7 G/DL (ref 6.1–8.1)
RBC # BLD AUTO: 4.7 MILLION/UL (ref 3.8–5.1)
SL AMB EGFR AFRICAN AMERICAN: 110 ML/MIN/1.73M2
SL AMB EGFR NON AFRICAN AMERICAN: 95 ML/MIN/1.73M2
SODIUM SERPL-SCNC: 139 MMOL/L (ref 135–146)
TIBC SERPL-MCNC: 259 MCG/DL (CALC) (ref 250–450)
WBC # BLD AUTO: 7.1 THOUSAND/UL (ref 3.8–10.8)

## 2018-09-17 PROCEDURE — 99214 OFFICE O/P EST MOD 30 MIN: CPT | Performed by: INTERNAL MEDICINE

## 2018-09-17 RX ORDER — DEXTROAMPHETAMINE SACCHARATE, AMPHETAMINE ASPARTATE MONOHYDRATE, DEXTROAMPHETAMINE SULFATE AND AMPHETAMINE SULFATE 7.5; 7.5; 7.5; 7.5 MG/1; MG/1; MG/1; MG/1
30 CAPSULE, EXTENDED RELEASE ORAL DAILY
Qty: 30 CAPSULE | Refills: 0 | Status: SHIPPED | OUTPATIENT
Start: 2018-09-17 | End: 2018-09-17 | Stop reason: SDUPTHER

## 2018-09-17 RX ORDER — DEXTROAMPHETAMINE SACCHARATE, AMPHETAMINE ASPARTATE MONOHYDRATE, DEXTROAMPHETAMINE SULFATE AND AMPHETAMINE SULFATE 7.5; 7.5; 7.5; 7.5 MG/1; MG/1; MG/1; MG/1
30 CAPSULE, EXTENDED RELEASE ORAL DAILY
Qty: 30 CAPSULE | Refills: 0 | Status: SHIPPED | OUTPATIENT
Start: 2018-09-17 | End: 2018-10-17 | Stop reason: SDUPTHER

## 2018-09-17 RX ORDER — DEXTROAMPHETAMINE SACCHARATE, AMPHETAMINE ASPARTATE MONOHYDRATE, DEXTROAMPHETAMINE SULFATE AND AMPHETAMINE SULFATE 7.5; 7.5; 7.5; 7.5 MG/1; MG/1; MG/1; MG/1
30 CAPSULE, EXTENDED RELEASE ORAL DAILY
Qty: 30 CAPSULE | Refills: 0 | OUTPATIENT
Start: 2018-09-17

## 2018-09-17 NOTE — TELEPHONE ENCOUNTER
I am receiving a refill for Adderall for this pt  I will refill it but she has not been seen for a med check in a long time  I do not follow her for this  It looks like Dr Ayad Noble was filling before  She can schedule f/u with him or me but I will not be refilling after this unless she is seen

## 2018-09-17 NOTE — PROGRESS NOTES
Hematology/Oncology Outpatient Follow- up Note  Andreia Jose 51 y o  female MRN: @ Encounter: [id number]        Date:  9/17/2018    Presenting Complaint/Diagnosis :  History of hemochromatosis    HPI:      Andreia Jose is seen for initial consultation 5/9/2018 regarding Hemochromatosis  The patient rarely has a family history of hemochromatosis and was tested and proven to have it  She was on a phlebotomy schedule but then was slowly weaned down where she was only giving occasionally  She was seeing a hematologist every other year  She states Her children were tested and are also positive  Her sister and her brother both are also positive  She has not been getting phlebotomies for a while now  Her last hematologist moved so she decided to come to see us  Previous Hematologic/ Oncologic History:      Phlebotomy    Current Hematologic/ Oncologic Treatment:      Phlebotomy every 2 months    Interval History:    The patient returns for follow-up visit  She has been getting phlebotomy every 2 months at [facility]  Her ferritin is still 106  I advised her to change this to every month  I will write her a prescription  She is otherwise asymptomatic  Denies any nausea denies any vomiting denies any diarrhea  The rest of her 14 point review of systems today was negative  Her most recent blood work shows white count 7 1 hemoglobin 15 3 platelet count 053 total iron 106% saturation 41%  Test Results:    Imaging: No results found      Labs:   Lab Results   Component Value Date    WBC 5 3 04/06/2018    HGB 15 2 04/06/2018    HCT 44 9 04/06/2018    MCV 95 3 04/06/2018     04/06/2018     Lab Results   Component Value Date     06/04/2014    K 4 1 06/04/2014     04/06/2018    CO2 29 04/06/2018    ANIONGAP 1 (L) 06/04/2014    BUN 14 04/06/2018    CREATININE 0 68 04/06/2018    GLUCOSE 89 06/04/2014    CALCIUM 9 2 04/06/2018    AST 18 06/04/2014    ALT 21 06/04/2014    ALKPHOS 118 (H) 04/06/2018    PROT 6 9 06/04/2014    BILITOT 1 0 06/04/2014       Lab Results   Component Value Date    IRON 154 04/06/2018    TIBC 234 (L) 06/04/2014    FERRITIN 107 04/06/2018       ROS: As stated in the history of present illness otherwise his 14 point review of systems today was negative  Active Problems:   Patient Active Problem List   Diagnosis    ADD (attention deficit disorder)    Allergic rhinitis    Other hemochromatosis       Past Medical History:   Past Medical History:   Diagnosis Date    Hemochromatosis     IBS (irritable bowel syndrome)        Surgical History:   Past Surgical History:   Procedure Laterality Date    COLONOSCOPY      resolved 02/13;  repeat due in 10 years        Family History:  No family history on file  Cancer-related family history is not on file  Social History:   Social History     Social History    Marital status: /Civil Union     Spouse name: N/A    Number of children: N/A    Years of education: N/A     Occupational History    Not on file  Social History Main Topics    Smoking status: Former Smoker    Smokeless tobacco: Never Used    Alcohol use No    Drug use: No    Sexual activity: Not on file     Other Topics Concern    Not on file     Social History Narrative    No narrative on file       Current Medications:   Current Outpatient Prescriptions   Medication Sig Dispense Refill    amphetamine-dextroamphetamine (ADDERALL XR) 30 MG 24 hr capsule Take 1 capsule (30 mg total) by mouth daily Max Daily Amount: 30 mg 30 capsule 0    cetirizine (ZyrTEC) 10 mg tablet Take 10 mg by mouth daily      Cholecalciferol (VITAMIN D) 2000 units CAPS Take 1 capsule by mouth daily      fluticasone (FLONASE) 50 mcg/act nasal spray into each nostril       No current facility-administered medications for this visit  Allergies: No Known Allergies    Physical Exam:    There is no height or weight on file to calculate BSA      Wt Readings from Last 3 Encounters:   05/17/18 113 kg (249 lb)   05/09/18 113 kg (249 lb)   04/05/18 113 kg (248 lb 12 8 oz)        Temp Readings from Last 3 Encounters:   05/09/18 98 4 °F (36 9 °C) (Tympanic)   04/05/18 97 5 °F (36 4 °C) (Tympanic)   02/06/18 (!) 97 4 °F (36 3 °C)        BP Readings from Last 3 Encounters:   05/09/18 126/88   04/05/18 126/78   02/06/18 142/70         Pulse Readings from Last 3 Encounters:   05/09/18 74   04/05/18 78   02/06/18 74         Physical Exam     Constitutional   General appearance: No acute distress, well appearing and well nourished  Eyes   Conjunctiva and lids: No swelling, erythema or discharge  Pupils and irises: Equal, round and reactive to light  Ears, Nose, Mouth, and Throat   External inspection of ears and nose: Normal     Nasal mucosa, septum, and turbinates: Normal without edema or erythema  Oropharynx: Normal with no erythema, edema, exudate or lesions  Pulmonary   Respiratory effort: No increased work of breathing or signs of respiratory distress  Auscultation of lungs: Clear to auscultation  Cardiovascular   Palpation of heart: Normal PMI, no thrills  Auscultation of heart: Normal rate and rhythm, normal S1 and S2, without murmurs  Examination of extremities for edema and/or varicosities: Normal     Carotid pulses: Normal     Abdomen   Abdomen: Non-tender, no masses  Liver and spleen: No hepatomegaly or splenomegaly  Lymphatic   Palpation of lymph nodes in neck: No lymphadenopathy  Musculoskeletal   Gait and station: Normal     Digits and nails: Normal without clubbing or cyanosis  Inspection/palpation of joints, bones, and muscles: Normal     Skin   Skin and subcutaneous tissue: Normal without rashes or lesions  Neurologic   Cranial nerves: Cranial nerves 2-12 intact  Sensation: No sensory loss      Psychiatric   Orientation to person, place, and time: Normal     Mood and affect: Normal         Assessment / Plan:      The patient is a pleasant 53-year-old female with a past medical history of Hereditary hemochromatosis  At her last visit, I advised her she needs to restart Her phlebotomy and she agreed  MRI of the liver showed a calculated liver iron concentration of 74 which is consistent with slight overload  Otherwise is a normal MRI of the abdomen  She goes to [facility] and has a unit of blood removed every 2 months  This obviously needs to be changed to once a month based on her ferritin which is tolerated  The patient is agreeable to this  I will write her a prescription  I'll see her back in 6 months  She will do phlebotomy every month  Goals and Barriers:  Current Goal:  Prolong Survival from   Barriers: None  Patient's Capacity to Self Care:  Patient able to self care  Portions of the record may have been created with voice recognition software   Occasional wrong word or "sound a like" substitutions may have occurred due to the inherent limitations of voice recognition software   Read the chart carefully and recognize, using context, where substitutions have occurred

## 2018-10-17 DIAGNOSIS — F98.8 ATTENTION DEFICIT DISORDER (ADD) WITHOUT HYPERACTIVITY: ICD-10-CM

## 2018-10-17 RX ORDER — DEXTROAMPHETAMINE SACCHARATE, AMPHETAMINE ASPARTATE MONOHYDRATE, DEXTROAMPHETAMINE SULFATE AND AMPHETAMINE SULFATE 7.5; 7.5; 7.5; 7.5 MG/1; MG/1; MG/1; MG/1
30 CAPSULE, EXTENDED RELEASE ORAL DAILY
Qty: 30 CAPSULE | Refills: 0 | Status: SHIPPED | OUTPATIENT
Start: 2018-10-17 | End: 2018-11-20 | Stop reason: SDUPTHER

## 2018-10-17 NOTE — TELEPHONE ENCOUNTER
From: Cassidy Mccollum  Sent: 10/17/2018 10:24 AM EDT  Subject: Medication Renewal Request    Robert Erwin would like a refill of the following medications:     amphetamine-dextroamphetamine (ADDERALL XR) 30 MG 24 hr capsule Keiko Paez MD]    Preferred pharmacy: [facility]Y #[id number]

## 2018-11-20 DIAGNOSIS — F98.8 ATTENTION DEFICIT DISORDER (ADD) WITHOUT HYPERACTIVITY: ICD-10-CM

## 2018-11-20 RX ORDER — DEXTROAMPHETAMINE SACCHARATE, AMPHETAMINE ASPARTATE MONOHYDRATE, DEXTROAMPHETAMINE SULFATE AND AMPHETAMINE SULFATE 7.5; 7.5; 7.5; 7.5 MG/1; MG/1; MG/1; MG/1
30 CAPSULE, EXTENDED RELEASE ORAL DAILY
Qty: 30 CAPSULE | Refills: 0 | Status: SHIPPED | OUTPATIENT
Start: 2018-11-20 | End: 2018-12-20 | Stop reason: SDUPTHER

## 2018-11-20 NOTE — TELEPHONE ENCOUNTER
From: Judd Howard  Sent: 11/20/2018 9:43 AM EST  Subject: Medication Renewal Request    Kristyn Aviles Collin would like a refill of the following medications:     amphetamine-dextroamphetamine (ADDERALL XR) 30 MG 24 hr capsule Nahomi Rudolph MD]    Preferred pharmacy: [facility]/PHARMACY #[id number]

## 2018-12-03 ENCOUNTER — OFFICE VISIT (OUTPATIENT)
Dept: FAMILY MEDICINE CLINIC | Facility: HOSPITAL | Age: 48
End: 2018-12-03
Payer: COMMERCIAL

## 2018-12-03 VITALS
DIASTOLIC BLOOD PRESSURE: 74 MMHG | HEART RATE: 82 BPM | TEMPERATURE: 97.2 F | WEIGHT: 250.4 LBS | BODY MASS INDEX: 33.92 KG/M2 | SYSTOLIC BLOOD PRESSURE: 128 MMHG | HEIGHT: 72 IN

## 2018-12-03 DIAGNOSIS — R05.9 COUGH: ICD-10-CM

## 2018-12-03 DIAGNOSIS — J02.9 PHARYNGITIS, UNSPECIFIED ETIOLOGY: Primary | ICD-10-CM

## 2018-12-03 LAB — S PYO AG THROAT QL: NEGATIVE

## 2018-12-03 PROCEDURE — 87880 STREP A ASSAY W/OPTIC: CPT | Performed by: NURSE PRACTITIONER

## 2018-12-03 PROCEDURE — 99213 OFFICE O/P EST LOW 20 MIN: CPT | Performed by: NURSE PRACTITIONER

## 2018-12-03 PROCEDURE — 3008F BODY MASS INDEX DOCD: CPT | Performed by: NURSE PRACTITIONER

## 2018-12-03 NOTE — PROGRESS NOTES
Assessment/Plan:     Rapid strep negative  Will send throat culture to confirm  Treat sore throat with Tylenol/ Motrin  Call with worsening or no improvement or fever > 101  Cough likely viral  Treat with OTC cough med  Call with worsening or no improvement  Diagnoses and all orders for this visit:    Pharyngitis, unspecified etiology  -     POCT rapid strepA  -     Throat culture; Future    Cough          Subjective:     Patient ID: Shorty Lay is a 51 y o  female  Bad sore throat for about 1 1/2 weeks  Cough mostly at night  Keeping her up  Has not felt feverish  No nasal congestion  Occasional post nasal drip  Took Tylenol yesterday  Helps sore throat  Denies sob and wheezing  Has seasonal allergies  Takes Zyrtec daily  Denies heartburn symptoms  Vane Gabriel that lives with her had pneumonia  Review of Systems   Constitutional: Negative for chills and fever  HENT: Positive for postnasal drip and sore throat  Negative for congestion  Respiratory: Positive for cough  Negative for shortness of breath and wheezing  The following portions of the patient's history were reviewed and updated as appropriate: allergies, current medications, past family history, past medical history, past social history, past surgical history and problem list     Objective:  Vitals:    12/03/18 1323   BP: 128/74   Pulse: 82   Temp: (!) 97 2 °F (36 2 °C)      Physical Exam   Constitutional: She is oriented to person, place, and time  She appears well-developed and well-nourished  HENT:   Right Ear: Tympanic membrane, external ear and ear canal normal    Left Ear: Tympanic membrane, external ear and ear canal normal    Mouth/Throat: Uvula is midline, oropharynx is clear and moist and mucous membranes are normal    Cardiovascular: Normal rate, regular rhythm and normal heart sounds  No murmur heard    Pulmonary/Chest: Effort normal and breath sounds normal    Lymphadenopathy:     She has no cervical adenopathy  Neurological: She is alert and oriented to person, place, and time  Skin: Skin is warm and dry  Psychiatric: She has a normal mood and affect

## 2018-12-20 DIAGNOSIS — F98.8 ATTENTION DEFICIT DISORDER (ADD) WITHOUT HYPERACTIVITY: ICD-10-CM

## 2018-12-21 RX ORDER — DEXTROAMPHETAMINE SACCHARATE, AMPHETAMINE ASPARTATE MONOHYDRATE, DEXTROAMPHETAMINE SULFATE AND AMPHETAMINE SULFATE 7.5; 7.5; 7.5; 7.5 MG/1; MG/1; MG/1; MG/1
30 CAPSULE, EXTENDED RELEASE ORAL DAILY
Qty: 30 CAPSULE | Refills: 0 | Status: SHIPPED | OUTPATIENT
Start: 2018-12-21 | End: 2019-01-18 | Stop reason: SDUPTHER

## 2018-12-21 NOTE — TELEPHONE ENCOUNTER
From: Alee Duran  Sent: 12/20/2018 9:53 AM EST  Subject: Medication Renewal Request    Claudette Baum would like a refill of the following medications:     amphetamine-dextroamphetamine (ADDERALL XR) 30 MG 24 hr capsule Maude Taylor MD]    Preferred pharmacy: [facility]/PHARMACY #[id number]

## 2019-01-18 DIAGNOSIS — F98.8 ATTENTION DEFICIT DISORDER (ADD) WITHOUT HYPERACTIVITY: ICD-10-CM

## 2019-01-18 RX ORDER — DEXTROAMPHETAMINE SACCHARATE, AMPHETAMINE ASPARTATE MONOHYDRATE, DEXTROAMPHETAMINE SULFATE AND AMPHETAMINE SULFATE 7.5; 7.5; 7.5; 7.5 MG/1; MG/1; MG/1; MG/1
30 CAPSULE, EXTENDED RELEASE ORAL DAILY
Qty: 30 CAPSULE | Refills: 0 | Status: SHIPPED | OUTPATIENT
Start: 2019-01-18 | End: 2019-02-14 | Stop reason: SDUPTHER

## 2019-01-18 NOTE — TELEPHONE ENCOUNTER
From: Beth Bal  Sent: 1/18/2019 8:29 AM EST  Subject: Medication Renewal Request    Raleigh Perez would like a refill of the following medications:     amphetamine-dextroamphetamine (ADDERALL XR) 30 MG 24 hr capsule Stephy Mary MD]    Preferred pharmacy: [facility]/PHARMACY #[id number]

## 2019-01-30 ENCOUNTER — TELEPHONE (OUTPATIENT)
Dept: FAMILY MEDICINE CLINIC | Facility: HOSPITAL | Age: 49
End: 2019-01-30

## 2019-01-30 DIAGNOSIS — T75.3XXS MOTION SICKNESS, SEQUELA: Primary | ICD-10-CM

## 2019-01-30 RX ORDER — SCOLOPAMINE TRANSDERMAL SYSTEM 1 MG/1
1 PATCH, EXTENDED RELEASE TRANSDERMAL
Qty: 4 PATCH | Refills: 0 | Status: SHIPPED | OUTPATIENT
Start: 2019-01-30 | End: 2020-06-22 | Stop reason: ALTCHOICE

## 2019-01-30 NOTE — TELEPHONE ENCOUNTER
pts going on a cruise next fri, last time she went she was rx'd an anti sea sickness patch, she was hoping she could get the same rx again to [facility] quang?

## 2019-02-14 DIAGNOSIS — F98.8 ATTENTION DEFICIT DISORDER (ADD) WITHOUT HYPERACTIVITY: ICD-10-CM

## 2019-02-17 RX ORDER — DEXTROAMPHETAMINE SACCHARATE, AMPHETAMINE ASPARTATE MONOHYDRATE, DEXTROAMPHETAMINE SULFATE AND AMPHETAMINE SULFATE 7.5; 7.5; 7.5; 7.5 MG/1; MG/1; MG/1; MG/1
30 CAPSULE, EXTENDED RELEASE ORAL DAILY
Qty: 30 CAPSULE | Refills: 0 | Status: SHIPPED | OUTPATIENT
Start: 2019-02-17 | End: 2019-03-18 | Stop reason: SDUPTHER

## 2019-03-18 DIAGNOSIS — F98.8 ATTENTION DEFICIT DISORDER (ADD) WITHOUT HYPERACTIVITY: ICD-10-CM

## 2019-03-18 RX ORDER — DEXTROAMPHETAMINE SACCHARATE, AMPHETAMINE ASPARTATE MONOHYDRATE, DEXTROAMPHETAMINE SULFATE AND AMPHETAMINE SULFATE 7.5; 7.5; 7.5; 7.5 MG/1; MG/1; MG/1; MG/1
30 CAPSULE, EXTENDED RELEASE ORAL DAILY
Qty: 30 CAPSULE | Refills: 0 | Status: SHIPPED | OUTPATIENT
Start: 2019-03-18 | End: 2019-04-16 | Stop reason: SDUPTHER

## 2019-03-28 ENCOUNTER — TELEPHONE (OUTPATIENT)
Dept: HEMATOLOGY ONCOLOGY | Facility: CLINIC | Age: 49
End: 2019-03-28

## 2019-03-29 ENCOUNTER — APPOINTMENT (OUTPATIENT)
Dept: LAB | Facility: HOSPITAL | Age: 49
End: 2019-03-29
Attending: INTERNAL MEDICINE
Payer: COMMERCIAL

## 2019-03-29 ENCOUNTER — TRANSCRIBE ORDERS (OUTPATIENT)
Dept: ADMINISTRATIVE | Facility: HOSPITAL | Age: 49
End: 2019-03-29

## 2019-03-29 ENCOUNTER — TELEPHONE (OUTPATIENT)
Dept: HEMATOLOGY ONCOLOGY | Facility: CLINIC | Age: 49
End: 2019-03-29

## 2019-03-29 DIAGNOSIS — E83.118: Primary | ICD-10-CM

## 2019-03-29 DIAGNOSIS — E83.118 OTHER HEMOCHROMATOSIS: ICD-10-CM

## 2019-03-29 DIAGNOSIS — E83.118: ICD-10-CM

## 2019-03-29 LAB
ALBUMIN SERPL BCP-MCNC: 3.4 G/DL (ref 3.5–5)
ALP SERPL-CCNC: 122 U/L (ref 46–116)
ALT SERPL W P-5'-P-CCNC: 13 U/L (ref 12–78)
ANION GAP SERPL CALCULATED.3IONS-SCNC: 6 MMOL/L (ref 4–13)
AST SERPL W P-5'-P-CCNC: 8 U/L (ref 5–45)
BASOPHILS # BLD AUTO: 0.02 THOUSANDS/ΜL (ref 0–0.1)
BASOPHILS NFR BLD AUTO: 0 % (ref 0–1)
BILIRUB SERPL-MCNC: 0.6 MG/DL (ref 0.2–1)
BUN SERPL-MCNC: 14 MG/DL (ref 5–25)
CALCIUM SERPL-MCNC: 9.1 MG/DL (ref 8.3–10.1)
CHLORIDE SERPL-SCNC: 103 MMOL/L (ref 100–108)
CO2 SERPL-SCNC: 31 MMOL/L (ref 21–32)
CREAT SERPL-MCNC: 0.66 MG/DL (ref 0.6–1.3)
EOSINOPHIL # BLD AUTO: 0.19 THOUSAND/ΜL (ref 0–0.61)
EOSINOPHIL NFR BLD AUTO: 3 % (ref 0–6)
ERYTHROCYTE [DISTWIDTH] IN BLOOD BY AUTOMATED COUNT: 11.8 % (ref 11.6–15.1)
FERRITIN SERPL-MCNC: 11 NG/ML (ref 8–388)
GFR SERPL CREATININE-BSD FRML MDRD: 103 ML/MIN/1.73SQ M
GLUCOSE SERPL-MCNC: 95 MG/DL (ref 65–140)
HCT VFR BLD AUTO: 43.9 % (ref 34.8–46.1)
HGB BLD-MCNC: 14 G/DL (ref 11.5–15.4)
IMM GRANULOCYTES # BLD AUTO: 0.02 THOUSAND/UL (ref 0–0.2)
IMM GRANULOCYTES NFR BLD AUTO: 0 % (ref 0–2)
IRON SATN MFR SERPL: 21 %
IRON SERPL-MCNC: 59 UG/DL (ref 50–170)
LYMPHOCYTES # BLD AUTO: 2.73 THOUSANDS/ΜL (ref 0.6–4.47)
LYMPHOCYTES NFR BLD AUTO: 39 % (ref 14–44)
MCH RBC QN AUTO: 30.3 PG (ref 26.8–34.3)
MCHC RBC AUTO-ENTMCNC: 31.9 G/DL (ref 31.4–37.4)
MCV RBC AUTO: 95 FL (ref 82–98)
MONOCYTES # BLD AUTO: 0.5 THOUSAND/ΜL (ref 0.17–1.22)
MONOCYTES NFR BLD AUTO: 7 % (ref 4–12)
NEUTROPHILS # BLD AUTO: 3.56 THOUSANDS/ΜL (ref 1.85–7.62)
NEUTS SEG NFR BLD AUTO: 51 % (ref 43–75)
NRBC BLD AUTO-RTO: 0 /100 WBCS
PLATELET # BLD AUTO: 226 THOUSANDS/UL (ref 149–390)
PMV BLD AUTO: 10.3 FL (ref 8.9–12.7)
POTASSIUM SERPL-SCNC: 3.7 MMOL/L (ref 3.5–5.3)
PROT SERPL-MCNC: 7.6 G/DL (ref 6.4–8.2)
RBC # BLD AUTO: 4.62 MILLION/UL (ref 3.81–5.12)
SODIUM SERPL-SCNC: 140 MMOL/L (ref 136–145)
TIBC SERPL-MCNC: 276 UG/DL (ref 250–450)
WBC # BLD AUTO: 7.02 THOUSAND/UL (ref 4.31–10.16)

## 2019-03-29 PROCEDURE — 83550 IRON BINDING TEST: CPT

## 2019-03-29 PROCEDURE — 83918 ORGANIC ACIDS TOTAL QUANT: CPT

## 2019-03-29 PROCEDURE — 36415 COLL VENOUS BLD VENIPUNCTURE: CPT

## 2019-03-29 PROCEDURE — 83540 ASSAY OF IRON: CPT

## 2019-03-29 PROCEDURE — 85025 COMPLETE CBC W/AUTO DIFF WBC: CPT

## 2019-03-29 PROCEDURE — 80053 COMPREHEN METABOLIC PANEL: CPT

## 2019-03-29 PROCEDURE — 82728 ASSAY OF FERRITIN: CPT

## 2019-03-29 NOTE — TELEPHONE ENCOUNTER
Called and [facility] for pt to have labs drawn prior to her appt with Dr Chuck Pallas on Monday 4/1  If pt can not have labs drawn we will have to reschedule

## 2019-03-29 NOTE — TELEPHONE ENCOUNTER
Patient called back and will get labs done today at [facility]  She states she was not aware of labs and it did not show ordered on AVS from September 2018  She will be at appointment on 4/1/19   Thank you

## 2019-04-01 ENCOUNTER — OFFICE VISIT (OUTPATIENT)
Dept: HEMATOLOGY ONCOLOGY | Facility: HOSPITAL | Age: 49
End: 2019-04-01
Payer: COMMERCIAL

## 2019-04-01 VITALS
SYSTOLIC BLOOD PRESSURE: 120 MMHG | OXYGEN SATURATION: 98 % | DIASTOLIC BLOOD PRESSURE: 60 MMHG | RESPIRATION RATE: 16 BRPM | HEART RATE: 74 BPM | HEIGHT: 72 IN | WEIGHT: 247 LBS | TEMPERATURE: 98.9 F | BODY MASS INDEX: 33.46 KG/M2

## 2019-04-01 DIAGNOSIS — E83.118 OTHER HEMOCHROMATOSIS: Primary | ICD-10-CM

## 2019-04-01 PROCEDURE — 99214 OFFICE O/P EST MOD 30 MIN: CPT | Performed by: INTERNAL MEDICINE

## 2019-04-02 LAB
METHYLMALONATE SERPL-SCNC: 215 NMOL/L (ref 0–378)
SL AMB DISCLAIMER: NORMAL

## 2019-04-16 DIAGNOSIS — F98.8 ATTENTION DEFICIT DISORDER (ADD) WITHOUT HYPERACTIVITY: ICD-10-CM

## 2019-04-16 RX ORDER — DEXTROAMPHETAMINE SACCHARATE, AMPHETAMINE ASPARTATE MONOHYDRATE, DEXTROAMPHETAMINE SULFATE AND AMPHETAMINE SULFATE 7.5; 7.5; 7.5; 7.5 MG/1; MG/1; MG/1; MG/1
30 CAPSULE, EXTENDED RELEASE ORAL DAILY
Qty: 30 CAPSULE | Refills: 0 | OUTPATIENT
Start: 2019-04-16

## 2019-04-16 RX ORDER — DEXTROAMPHETAMINE SACCHARATE, AMPHETAMINE ASPARTATE MONOHYDRATE, DEXTROAMPHETAMINE SULFATE AND AMPHETAMINE SULFATE 7.5; 7.5; 7.5; 7.5 MG/1; MG/1; MG/1; MG/1
30 CAPSULE, EXTENDED RELEASE ORAL DAILY
Qty: 30 CAPSULE | Refills: 0 | Status: SHIPPED | OUTPATIENT
Start: 2019-04-16 | End: 2019-05-16 | Stop reason: SDUPTHER

## 2019-05-13 DIAGNOSIS — E83.118 OTHER HEMOCHROMATOSIS: Primary | ICD-10-CM

## 2019-05-16 DIAGNOSIS — F98.8 ATTENTION DEFICIT DISORDER (ADD) WITHOUT HYPERACTIVITY: ICD-10-CM

## 2019-05-16 RX ORDER — DEXTROAMPHETAMINE SACCHARATE, AMPHETAMINE ASPARTATE MONOHYDRATE, DEXTROAMPHETAMINE SULFATE AND AMPHETAMINE SULFATE 7.5; 7.5; 7.5; 7.5 MG/1; MG/1; MG/1; MG/1
30 CAPSULE, EXTENDED RELEASE ORAL DAILY
Qty: 30 CAPSULE | Refills: 0 | Status: SHIPPED | OUTPATIENT
Start: 2019-05-16 | End: 2019-06-17 | Stop reason: SDUPTHER

## 2019-05-23 ENCOUNTER — OFFICE VISIT (OUTPATIENT)
Dept: FAMILY MEDICINE CLINIC | Facility: HOSPITAL | Age: 49
End: 2019-05-23
Payer: COMMERCIAL

## 2019-05-23 VITALS
TEMPERATURE: 98.1 F | SYSTOLIC BLOOD PRESSURE: 126 MMHG | HEART RATE: 80 BPM | WEIGHT: 258.4 LBS | HEIGHT: 72 IN | BODY MASS INDEX: 35 KG/M2 | DIASTOLIC BLOOD PRESSURE: 72 MMHG

## 2019-05-23 DIAGNOSIS — E83.118 OTHER HEMOCHROMATOSIS: ICD-10-CM

## 2019-05-23 DIAGNOSIS — Z00.00 ANNUAL PHYSICAL EXAM: Primary | ICD-10-CM

## 2019-05-23 DIAGNOSIS — Z13.228 SCREENING FOR ENDOCRINE, METABOLIC AND IMMUNITY DISORDER: ICD-10-CM

## 2019-05-23 DIAGNOSIS — Z13.29 SCREENING FOR ENDOCRINE, METABOLIC AND IMMUNITY DISORDER: ICD-10-CM

## 2019-05-23 DIAGNOSIS — Z13.0 SCREENING FOR ENDOCRINE, METABOLIC AND IMMUNITY DISORDER: ICD-10-CM

## 2019-05-23 DIAGNOSIS — Z12.11 SCREEN FOR COLON CANCER: ICD-10-CM

## 2019-05-23 DIAGNOSIS — E66.9 OBESITY (BMI 35.0-39.9 WITHOUT COMORBIDITY): ICD-10-CM

## 2019-05-23 DIAGNOSIS — Z13.220 SCREENING CHOLESTEROL LEVEL: ICD-10-CM

## 2019-05-23 DIAGNOSIS — M21.612 BUNION, LEFT FOOT: ICD-10-CM

## 2019-05-23 DIAGNOSIS — F98.8 ATTENTION DEFICIT DISORDER, UNSPECIFIED HYPERACTIVITY PRESENCE: ICD-10-CM

## 2019-05-23 PROCEDURE — 99396 PREV VISIT EST AGE 40-64: CPT | Performed by: NURSE PRACTITIONER

## 2019-06-17 DIAGNOSIS — F98.8 ATTENTION DEFICIT DISORDER (ADD) WITHOUT HYPERACTIVITY: ICD-10-CM

## 2019-06-17 RX ORDER — DEXTROAMPHETAMINE SACCHARATE, AMPHETAMINE ASPARTATE MONOHYDRATE, DEXTROAMPHETAMINE SULFATE AND AMPHETAMINE SULFATE 7.5; 7.5; 7.5; 7.5 MG/1; MG/1; MG/1; MG/1
30 CAPSULE, EXTENDED RELEASE ORAL DAILY
Qty: 30 CAPSULE | Refills: 0 | Status: SHIPPED | OUTPATIENT
Start: 2019-06-17 | End: 2019-07-18 | Stop reason: SDUPTHER

## 2019-07-17 DIAGNOSIS — F98.8 ATTENTION DEFICIT DISORDER (ADD) WITHOUT HYPERACTIVITY: ICD-10-CM

## 2019-07-17 RX ORDER — DEXTROAMPHETAMINE SACCHARATE, AMPHETAMINE ASPARTATE MONOHYDRATE, DEXTROAMPHETAMINE SULFATE AND AMPHETAMINE SULFATE 7.5; 7.5; 7.5; 7.5 MG/1; MG/1; MG/1; MG/1
30 CAPSULE, EXTENDED RELEASE ORAL DAILY
Qty: 30 CAPSULE | Refills: 0 | Status: CANCELLED | OUTPATIENT
Start: 2019-07-17

## 2019-07-18 DIAGNOSIS — F98.8 ATTENTION DEFICIT DISORDER (ADD) WITHOUT HYPERACTIVITY: ICD-10-CM

## 2019-07-18 RX ORDER — DEXTROAMPHETAMINE SACCHARATE, AMPHETAMINE ASPARTATE MONOHYDRATE, DEXTROAMPHETAMINE SULFATE AND AMPHETAMINE SULFATE 7.5; 7.5; 7.5; 7.5 MG/1; MG/1; MG/1; MG/1
30 CAPSULE, EXTENDED RELEASE ORAL DAILY
Qty: 30 CAPSULE | Refills: 0 | Status: SHIPPED | OUTPATIENT
Start: 2019-07-18 | End: 2019-08-15 | Stop reason: SDUPTHER

## 2019-08-15 DIAGNOSIS — F98.8 ATTENTION DEFICIT DISORDER (ADD) WITHOUT HYPERACTIVITY: ICD-10-CM

## 2019-08-16 RX ORDER — DEXTROAMPHETAMINE SACCHARATE, AMPHETAMINE ASPARTATE MONOHYDRATE, DEXTROAMPHETAMINE SULFATE AND AMPHETAMINE SULFATE 7.5; 7.5; 7.5; 7.5 MG/1; MG/1; MG/1; MG/1
30 CAPSULE, EXTENDED RELEASE ORAL DAILY
Qty: 30 CAPSULE | Refills: 0 | Status: SHIPPED | OUTPATIENT
Start: 2019-08-16 | End: 2019-09-06 | Stop reason: SDUPTHER

## 2019-09-06 DIAGNOSIS — F98.8 ATTENTION DEFICIT DISORDER (ADD) WITHOUT HYPERACTIVITY: ICD-10-CM

## 2019-09-06 RX ORDER — DEXTROAMPHETAMINE SACCHARATE, AMPHETAMINE ASPARTATE MONOHYDRATE, DEXTROAMPHETAMINE SULFATE AND AMPHETAMINE SULFATE 7.5; 7.5; 7.5; 7.5 MG/1; MG/1; MG/1; MG/1
30 CAPSULE, EXTENDED RELEASE ORAL DAILY
Qty: 30 CAPSULE | Refills: 0 | Status: SHIPPED | OUTPATIENT
Start: 2019-09-06 | End: 2019-10-11 | Stop reason: SDUPTHER

## 2019-09-19 LAB
CHOLEST SERPL-MCNC: 158 MG/DL
CHOLEST/HDLC SERPL: 2.8 (CALC)
EST. AVERAGE GLUCOSE BLD GHB EST-MCNC: 111 (CALC)
EST. AVERAGE GLUCOSE BLD GHB EST-SCNC: 6.2 (CALC)
HBA1C MFR BLD: 5.5 % OF TOTAL HGB
HDLC SERPL-MCNC: 57 MG/DL
LDLC SERPL CALC-MCNC: 85 MG/DL (CALC)
NONHDLC SERPL-MCNC: 101 MG/DL (CALC)
TRIGL SERPL-MCNC: 73 MG/DL
TSH SERPL-ACNC: 1.79 MIU/L

## 2019-09-21 LAB
ALBUMIN SERPL-MCNC: 3.9 G/DL (ref 3.6–5.1)
ALBUMIN/GLOB SERPL: 1.2 (CALC) (ref 1–2.5)
ALP SERPL-CCNC: 125 U/L (ref 33–130)
ALT SERPL-CCNC: 21 U/L (ref 6–29)
AST SERPL-CCNC: 21 U/L (ref 10–35)
BASOPHILS # BLD AUTO: 12 CELLS/UL (ref 0–200)
BASOPHILS NFR BLD AUTO: 0.2 %
BILIRUB SERPL-MCNC: 0.8 MG/DL (ref 0.2–1.2)
BUN SERPL-MCNC: 11 MG/DL (ref 7–25)
BUN/CREAT SERPL: NORMAL (CALC) (ref 6–22)
CALCIUM SERPL-MCNC: 9.1 MG/DL (ref 8.6–10.4)
CHLORIDE SERPL-SCNC: 105 MMOL/L (ref 98–110)
CO2 SERPL-SCNC: 30 MMOL/L (ref 20–32)
CREAT SERPL-MCNC: 0.6 MG/DL (ref 0.5–1.05)
EOSINOPHIL # BLD AUTO: 201 CELLS/UL (ref 15–500)
EOSINOPHIL NFR BLD AUTO: 3.4 %
ERYTHROCYTE [DISTWIDTH] IN BLOOD BY AUTOMATED COUNT: 12.7 % (ref 11–15)
FERRITIN SERPL-MCNC: 20 NG/ML (ref 16–232)
GLOBULIN SER CALC-MCNC: 3.3 G/DL (CALC) (ref 1.9–3.7)
GLUCOSE SERPL-MCNC: 96 MG/DL (ref 65–99)
HCT VFR BLD AUTO: 45.6 % (ref 35–45)
HGB BLD-MCNC: 15.2 G/DL (ref 11.7–15.5)
IRON SATN MFR SERPL: 34 % (CALC) (ref 16–45)
IRON SERPL-MCNC: 97 MCG/DL (ref 45–160)
LYMPHOCYTES # BLD AUTO: 2313 CELLS/UL (ref 850–3900)
LYMPHOCYTES NFR BLD AUTO: 39.2 %
MCH RBC QN AUTO: 31 PG (ref 27–33)
MCHC RBC AUTO-ENTMCNC: 33.3 G/DL (ref 32–36)
MCV RBC AUTO: 93.1 FL (ref 80–100)
METHYLMALONATE SERPL-SCNC: 145 NMOL/L (ref 87–318)
MONOCYTES # BLD AUTO: 425 CELLS/UL (ref 200–950)
MONOCYTES NFR BLD AUTO: 7.2 %
NEUTROPHILS # BLD AUTO: 2950 CELLS/UL (ref 1500–7800)
NEUTROPHILS NFR BLD AUTO: 50 %
PLATELET # BLD AUTO: 214 THOUSAND/UL (ref 140–400)
PMV BLD REES-ECKER: 10.6 FL (ref 7.5–12.5)
POTASSIUM SERPL-SCNC: 4.2 MMOL/L (ref 3.5–5.3)
PROT SERPL-MCNC: 7.2 G/DL (ref 6.1–8.1)
RBC # BLD AUTO: 4.9 MILLION/UL (ref 3.8–5.1)
SL AMB EGFR AFRICAN AMERICAN: 123 ML/MIN/1.73M2
SL AMB EGFR NON AFRICAN AMERICAN: 106 ML/MIN/1.73M2
SODIUM SERPL-SCNC: 141 MMOL/L (ref 135–146)
TIBC SERPL-MCNC: 285 MCG/DL (CALC) (ref 250–450)
WBC # BLD AUTO: 5.9 THOUSAND/UL (ref 3.8–10.8)

## 2019-10-11 DIAGNOSIS — F98.8 ATTENTION DEFICIT DISORDER (ADD) WITHOUT HYPERACTIVITY: ICD-10-CM

## 2019-10-11 RX ORDER — DEXTROAMPHETAMINE SACCHARATE, AMPHETAMINE ASPARTATE MONOHYDRATE, DEXTROAMPHETAMINE SULFATE AND AMPHETAMINE SULFATE 7.5; 7.5; 7.5; 7.5 MG/1; MG/1; MG/1; MG/1
30 CAPSULE, EXTENDED RELEASE ORAL DAILY
Qty: 30 CAPSULE | Refills: 0 | Status: SHIPPED | OUTPATIENT
Start: 2019-10-11 | End: 2019-11-12 | Stop reason: SDUPTHER

## 2019-10-11 RX ORDER — DEXTROAMPHETAMINE SACCHARATE, AMPHETAMINE ASPARTATE MONOHYDRATE, DEXTROAMPHETAMINE SULFATE AND AMPHETAMINE SULFATE 7.5; 7.5; 7.5; 7.5 MG/1; MG/1; MG/1; MG/1
30 CAPSULE, EXTENDED RELEASE ORAL DAILY
Qty: 30 CAPSULE | Refills: 0 | OUTPATIENT
Start: 2019-10-11

## 2019-10-14 ENCOUNTER — OFFICE VISIT (OUTPATIENT)
Dept: HEMATOLOGY ONCOLOGY | Facility: HOSPITAL | Age: 49
End: 2019-10-14
Payer: COMMERCIAL

## 2019-10-14 VITALS
HEIGHT: 72 IN | DIASTOLIC BLOOD PRESSURE: 82 MMHG | WEIGHT: 265 LBS | HEART RATE: 97 BPM | BODY MASS INDEX: 35.89 KG/M2 | TEMPERATURE: 98.3 F | SYSTOLIC BLOOD PRESSURE: 120 MMHG | OXYGEN SATURATION: 98 % | RESPIRATION RATE: 16 BRPM

## 2019-10-14 DIAGNOSIS — E83.118 OTHER HEMOCHROMATOSIS: Primary | ICD-10-CM

## 2019-10-14 PROCEDURE — 99214 OFFICE O/P EST MOD 30 MIN: CPT | Performed by: NURSE PRACTITIONER

## 2019-10-14 NOTE — PROGRESS NOTES
Hematology/Oncology Outpatient Follow- up Note  Rena Hurt 47 y o  female MRN: @ Encounter: [id number]        Date:  10/14/2019    Presenting Complaint/Diagnosis :  History of hemochromatosis    HPI:  Timmy Scale Fothergill is seen for initial consultation 5/9/2018 regarding Hemochromatosis  The patient rarely has a family history of hemochromatosis and was tested and proven to have it  She was on a phlebotomy schedule but then was slowly weaned down where she was only giving occasionally  She was seeing a hematologist every other year  She states Her children were tested and are also positive  Her sister and her brother both are also positive  She has not been getting phlebotomies for a while now  Her last hematologist moved so she decided to come to see us      Previous Hematologic/ Oncologic History:    Phlebotomy    Current Hematologic/ Oncologic Treatment:    Monthly phlebotomy    Interval History:    The patient returns for follow-up visit  She is currently donating blood monthly [facility]  She states that she donated 5 out of the last 6 months  At this time her hemoglobin is 15 2, hematocrit 45 6, ferritin 20, iron saturation 34%  Overall she reports feeling well  Denies chest pain, shortness of breath, nausea, vomiting, diarrhea, bleeding/bruising, and fevers/infection  Test Results:    Imaging: No results found      Labs:   Lab Results   Component Value Date    WBC 5 9 09/18/2019    HGB 15 2 09/18/2019    HCT 45 6 (H) 09/18/2019    MCV 93 1 09/18/2019     09/18/2019     Lab Results   Component Value Date     06/04/2014    K 4 2 09/18/2019     09/18/2019    CO2 30 09/18/2019    ANIONGAP 1 (L) 06/04/2014    BUN 11 09/18/2019    CREATININE 0 60 09/18/2019    GLUCOSE 89 06/04/2014    CALCIUM 9 1 09/18/2019    AST 21 09/18/2019    ALT 21 09/18/2019    ALKPHOS 125 09/18/2019    PROT 6 9 06/04/2014    BILITOT 1 0 06/04/2014    EGFR 103 03/29/2019         No results found for: SPEP, UPEP    No results found for: PSA    No results found for: CEA    No results found for:     No results found for: AFP    Lab Results   Component Value Date    IRON 97 09/18/2019    TIBC 285 09/18/2019    FERRITIN 20 09/18/2019       No results found for: [id number]      ROS: As stated in the history of present illness otherwise his 14 point review of systems today was negative  Active Problems:   Patient Active Problem List   Diagnosis    ADD (attention deficit disorder)    Allergic rhinitis    Other hemochromatosis       Past Medical History:   Past Medical History:   Diagnosis Date    Hemochromatosis     IBS (irritable bowel syndrome)        Surgical History:   Past Surgical History:   Procedure Laterality Date    COLONOSCOPY      resolved 02/13;  repeat due in 10 years        Family History:  No family history on file  Cancer-related family history is not on file      Social History:   Social History     Socioeconomic History    Marital status: /Civil Union     Spouse name: Not on file    Number of children: Not on file    Years of education: Not on file    Highest education level: Not on file   Occupational History    Not on file   Social Needs    Financial resource strain: Not on file    Food insecurity:     Worry: Not on file     Inability: Not on file    Transportation needs:     Medical: Not on file     Non-medical: Not on file   Tobacco Use    Smoking status: Former Smoker    Smokeless tobacco: Never Used   Substance and Sexual Activity    Alcohol use: No    Drug use: No    Sexual activity: Not on file   Lifestyle    Physical activity:     Days per week: Not on file     Minutes per session: Not on file    Stress: Not on file   Relationships    Social connections:     Talks on phone: Not on file     Gets together: Not on file     Attends Restorationism service: Not on file     Active member of club or organization: Not on file     Attends meetings of clubs or organizations: Not on file     Relationship status: Not on file    Intimate partner violence:     Fear of current or ex partner: Not on file     Emotionally abused: Not on file     Physically abused: Not on file     Forced sexual activity: Not on file   Other Topics Concern    Not on file   Social History Narrative    Not on file       Current Medications:   Current Outpatient Medications   Medication Sig Dispense Refill    amphetamine-dextroamphetamine (ADDERALL XR) 30 MG 24 hr capsule Take 1 capsule (30 mg total) by mouth dailyMax Daily Amount: 30 mg 30 capsule 0    cetirizine (ZyrTEC) 10 mg tablet Take 10 mg by mouth daily      Cholecalciferol (VITAMIN D) 2000 units CAPS Take 1 capsule by mouth daily      scopolamine (TRANSDERM-SCOP) 1 5 mg/3 days TD 72 hr patch Place 1 patch on the skin every third day 4 patch 0     No current facility-administered medications for this visit  Allergies: No Known Allergies    Physical Exam:    Body surface area is 2 4 meters squared  Wt Readings from Last 3 Encounters:   10/14/19 120 kg (265 lb)   05/23/19 117 kg (258 lb 6 4 oz)   04/01/19 112 kg (247 lb)        Temp Readings from Last 3 Encounters:   10/14/19 98 3 °F (36 8 °C) (Tympanic)   05/23/19 98 1 °F (36 7 °C) (Tympanic)   04/01/19 98 9 °F (37 2 °C) (Tympanic)        BP Readings from Last 3 Encounters:   10/14/19 120/82   05/23/19 126/72   04/01/19 120/60         Pulse Readings from Last 3 Encounters:   10/14/19 97   05/23/19 80   04/01/19 74     @LASTSAO2(3)@      Physical Exam     Constitutional   General appearance: No acute distress, well appearing and well nourished  Eyes   Conjunctiva and lids: No swelling, erythema or discharge  Pupils and irises: Equal, round and reactive to light  Ears, Nose, Mouth, and Throat   External inspection of ears and nose: Normal     Nasal mucosa, septum, and turbinates: Normal without edema or erythema      Oropharynx: Normal with no erythema, edema, exudate or lesions  Pulmonary   Respiratory effort: No increased work of breathing or signs of respiratory distress  Auscultation of lungs: Clear to auscultation  Cardiovascular   Palpation of heart: Normal PMI, no thrills  Auscultation of heart: Normal rate and rhythm, normal S1 and S2, without murmurs  Examination of extremities for edema and/or varicosities: Normal     Carotid pulses: Normal     Abdomen   Abdomen: Non-tender, no masses  Liver and spleen: No hepatomegaly or splenomegaly  Lymphatic   Palpation of lymph nodes in neck: No lymphadenopathy  Musculoskeletal   Gait and station: Normal     Digits and nails: Normal without clubbing or cyanosis  Inspection/palpation of joints, bones, and muscles: Normal     Skin   Skin and subcutaneous tissue: Normal without rashes or lesions  Neurologic   Cranial nerves: Cranial nerves 2-12 intact  Sensation: No sensory loss  Psychiatric   Orientation to person, place, and time: Normal     Mood and affect: Normal         Assessment / Plan:    The patient is a 69-year-old female with hereditary hemochromatosis  She had a MRI completed on 05/17/2018 revealing liver iron concentration of 74 umol/g indicating slightly overload  Otherwise the MRI was normal   She will continue going to [facility] blood [facility] and having a unit of blood  mL of whole blood removed monthly  She needed a form filled out for this and also a script which I provided for her  I did advise her to continue with the monthly donations and she was in agreement with the plan  We will see her back in 6 months with repeat blood work  Goals and Barriers:  Current Goal:  Prolong Survival from hemochromatosis  Barriers: None  Patient's Capacity to Self Care:  Patient able to self care      Portions of the record may have been created with voice recognition software   Occasional wrong word or "sound a like" substitutions may have occurred due to the inherent limitations of voice recognition software   Read the chart carefully and recognize, using context, where substitutions have occurred

## 2019-11-12 DIAGNOSIS — F98.8 ATTENTION DEFICIT DISORDER (ADD) WITHOUT HYPERACTIVITY: ICD-10-CM

## 2019-11-12 RX ORDER — DEXTROAMPHETAMINE SACCHARATE, AMPHETAMINE ASPARTATE MONOHYDRATE, DEXTROAMPHETAMINE SULFATE AND AMPHETAMINE SULFATE 7.5; 7.5; 7.5; 7.5 MG/1; MG/1; MG/1; MG/1
30 CAPSULE, EXTENDED RELEASE ORAL DAILY
Qty: 30 CAPSULE | Refills: 0 | Status: SHIPPED | OUTPATIENT
Start: 2019-11-12 | End: 2019-12-12 | Stop reason: SDUPTHER

## 2019-12-12 DIAGNOSIS — F98.8 ATTENTION DEFICIT DISORDER (ADD) WITHOUT HYPERACTIVITY: ICD-10-CM

## 2019-12-12 RX ORDER — DEXTROAMPHETAMINE SACCHARATE, AMPHETAMINE ASPARTATE MONOHYDRATE, DEXTROAMPHETAMINE SULFATE AND AMPHETAMINE SULFATE 7.5; 7.5; 7.5; 7.5 MG/1; MG/1; MG/1; MG/1
30 CAPSULE, EXTENDED RELEASE ORAL DAILY
Qty: 30 CAPSULE | Refills: 0 | Status: SHIPPED | OUTPATIENT
Start: 2019-12-12 | End: 2020-01-09 | Stop reason: SDUPTHER

## 2020-01-09 DIAGNOSIS — F98.8 ATTENTION DEFICIT DISORDER (ADD) WITHOUT HYPERACTIVITY: ICD-10-CM

## 2020-01-09 RX ORDER — DEXTROAMPHETAMINE SACCHARATE, AMPHETAMINE ASPARTATE MONOHYDRATE, DEXTROAMPHETAMINE SULFATE AND AMPHETAMINE SULFATE 7.5; 7.5; 7.5; 7.5 MG/1; MG/1; MG/1; MG/1
30 CAPSULE, EXTENDED RELEASE ORAL DAILY
Qty: 30 CAPSULE | Refills: 0 | Status: SHIPPED | OUTPATIENT
Start: 2020-01-09 | End: 2020-02-07 | Stop reason: SDUPTHER

## 2020-02-07 DIAGNOSIS — F98.8 ATTENTION DEFICIT DISORDER (ADD) WITHOUT HYPERACTIVITY: ICD-10-CM

## 2020-02-08 RX ORDER — DEXTROAMPHETAMINE SACCHARATE, AMPHETAMINE ASPARTATE MONOHYDRATE, DEXTROAMPHETAMINE SULFATE AND AMPHETAMINE SULFATE 7.5; 7.5; 7.5; 7.5 MG/1; MG/1; MG/1; MG/1
30 CAPSULE, EXTENDED RELEASE ORAL DAILY
Qty: 30 CAPSULE | Refills: 0 | Status: SHIPPED | OUTPATIENT
Start: 2020-02-08 | End: 2020-03-09 | Stop reason: SDUPTHER

## 2020-03-09 DIAGNOSIS — F98.8 ATTENTION DEFICIT DISORDER (ADD) WITHOUT HYPERACTIVITY: ICD-10-CM

## 2020-03-09 RX ORDER — DEXTROAMPHETAMINE SACCHARATE, AMPHETAMINE ASPARTATE MONOHYDRATE, DEXTROAMPHETAMINE SULFATE AND AMPHETAMINE SULFATE 7.5; 7.5; 7.5; 7.5 MG/1; MG/1; MG/1; MG/1
30 CAPSULE, EXTENDED RELEASE ORAL DAILY
Qty: 30 CAPSULE | Refills: 0 | Status: SHIPPED | OUTPATIENT
Start: 2020-03-09 | End: 2020-04-08 | Stop reason: SDUPTHER

## 2020-04-08 DIAGNOSIS — F98.8 ATTENTION DEFICIT DISORDER (ADD) WITHOUT HYPERACTIVITY: ICD-10-CM

## 2020-04-08 RX ORDER — DEXTROAMPHETAMINE SACCHARATE, AMPHETAMINE ASPARTATE MONOHYDRATE, DEXTROAMPHETAMINE SULFATE AND AMPHETAMINE SULFATE 7.5; 7.5; 7.5; 7.5 MG/1; MG/1; MG/1; MG/1
30 CAPSULE, EXTENDED RELEASE ORAL DAILY
Qty: 30 CAPSULE | Refills: 0 | Status: SHIPPED | OUTPATIENT
Start: 2020-04-08 | End: 2020-05-08 | Stop reason: SDUPTHER

## 2020-05-08 DIAGNOSIS — F98.8 ATTENTION DEFICIT DISORDER (ADD) WITHOUT HYPERACTIVITY: ICD-10-CM

## 2020-05-10 RX ORDER — DEXTROAMPHETAMINE SACCHARATE, AMPHETAMINE ASPARTATE MONOHYDRATE, DEXTROAMPHETAMINE SULFATE AND AMPHETAMINE SULFATE 7.5; 7.5; 7.5; 7.5 MG/1; MG/1; MG/1; MG/1
30 CAPSULE, EXTENDED RELEASE ORAL DAILY
Qty: 30 CAPSULE | Refills: 0 | Status: SHIPPED | OUTPATIENT
Start: 2020-05-10 | End: 2020-06-08 | Stop reason: SDUPTHER

## 2020-05-22 PROBLEM — E83.110 HEREDITARY HEMOCHROMATOSIS (HCC): Status: ACTIVE | Noted: 2018-04-05

## 2020-06-08 DIAGNOSIS — F98.8 ATTENTION DEFICIT DISORDER (ADD) WITHOUT HYPERACTIVITY: ICD-10-CM

## 2020-06-08 RX ORDER — DEXTROAMPHETAMINE SACCHARATE, AMPHETAMINE ASPARTATE MONOHYDRATE, DEXTROAMPHETAMINE SULFATE AND AMPHETAMINE SULFATE 7.5; 7.5; 7.5; 7.5 MG/1; MG/1; MG/1; MG/1
30 CAPSULE, EXTENDED RELEASE ORAL DAILY
Qty: 30 CAPSULE | Refills: 0 | Status: SHIPPED | OUTPATIENT
Start: 2020-06-08 | End: 2020-07-07 | Stop reason: SDUPTHER

## 2020-06-11 ENCOUNTER — OFFICE VISIT (OUTPATIENT)
Dept: FAMILY MEDICINE CLINIC | Facility: HOSPITAL | Age: 50
End: 2020-06-11
Payer: COMMERCIAL

## 2020-06-11 VITALS
DIASTOLIC BLOOD PRESSURE: 74 MMHG | SYSTOLIC BLOOD PRESSURE: 126 MMHG | HEART RATE: 76 BPM | WEIGHT: 262.2 LBS | HEIGHT: 72 IN | BODY MASS INDEX: 35.51 KG/M2 | OXYGEN SATURATION: 97 % | TEMPERATURE: 97.4 F

## 2020-06-11 DIAGNOSIS — E83.110 HEREDITARY HEMOCHROMATOSIS (HCC): ICD-10-CM

## 2020-06-11 DIAGNOSIS — F98.8 ATTENTION DEFICIT DISORDER, UNSPECIFIED HYPERACTIVITY PRESENCE: ICD-10-CM

## 2020-06-11 DIAGNOSIS — M21.612 BUNION, LEFT FOOT: ICD-10-CM

## 2020-06-11 DIAGNOSIS — Z13.220 SCREENING CHOLESTEROL LEVEL: ICD-10-CM

## 2020-06-11 DIAGNOSIS — Z00.00 ANNUAL PHYSICAL EXAM: Primary | ICD-10-CM

## 2020-06-11 DIAGNOSIS — Z11.4 SCREENING FOR HIV (HUMAN IMMUNODEFICIENCY VIRUS): ICD-10-CM

## 2020-06-11 DIAGNOSIS — Z12.11 SCREEN FOR COLON CANCER: ICD-10-CM

## 2020-06-11 DIAGNOSIS — M72.2 PLANTAR FASCIITIS OF RIGHT FOOT: ICD-10-CM

## 2020-06-11 PROCEDURE — 99396 PREV VISIT EST AGE 40-64: CPT | Performed by: NURSE PRACTITIONER

## 2020-06-18 LAB
ALBUMIN SERPL-MCNC: 3.9 G/DL (ref 3.6–5.1)
ALBUMIN/GLOB SERPL: 1.3 (CALC) (ref 1–2.5)
ALP SERPL-CCNC: 102 U/L (ref 37–153)
ALT SERPL-CCNC: 24 U/L (ref 6–29)
AST SERPL-CCNC: 23 U/L (ref 10–35)
BASOPHILS # BLD AUTO: 19 CELLS/UL (ref 0–200)
BASOPHILS NFR BLD AUTO: 0.4 %
BILIRUB SERPL-MCNC: 1.1 MG/DL (ref 0.2–1.2)
BUN SERPL-MCNC: 12 MG/DL (ref 7–25)
BUN/CREAT SERPL: ABNORMAL (CALC) (ref 6–22)
CALCIUM SERPL-MCNC: 9.3 MG/DL (ref 8.6–10.4)
CHLORIDE SERPL-SCNC: 103 MMOL/L (ref 98–110)
CHOLEST SERPL-MCNC: 116 MG/DL
CHOLEST/HDLC SERPL: 2.8 (CALC)
CO2 SERPL-SCNC: 27 MMOL/L (ref 20–32)
CREAT SERPL-MCNC: 0.74 MG/DL (ref 0.5–1.05)
EOSINOPHIL # BLD AUTO: 141 CELLS/UL (ref 15–500)
EOSINOPHIL NFR BLD AUTO: 3 %
ERYTHROCYTE [DISTWIDTH] IN BLOOD BY AUTOMATED COUNT: 13.1 % (ref 11–15)
FERRITIN SERPL-MCNC: 22 NG/ML (ref 16–232)
GLOBULIN SER CALC-MCNC: 2.9 G/DL (CALC) (ref 1.9–3.7)
GLUCOSE SERPL-MCNC: 100 MG/DL (ref 65–99)
HCT VFR BLD AUTO: 47 % (ref 35–45)
HDLC SERPL-MCNC: 42 MG/DL
HGB BLD-MCNC: 15.4 G/DL (ref 11.7–15.5)
HIV 1+2 AB+HIV1 P24 AG SERPL QL IA: NORMAL
LDLC SERPL CALC-MCNC: 60 MG/DL (CALC)
LYMPHOCYTES # BLD AUTO: 2190 CELLS/UL (ref 850–3900)
LYMPHOCYTES NFR BLD AUTO: 46.6 %
MCH RBC QN AUTO: 31.1 PG (ref 27–33)
MCHC RBC AUTO-ENTMCNC: 32.8 G/DL (ref 32–36)
MCV RBC AUTO: 94.9 FL (ref 80–100)
MONOCYTES # BLD AUTO: 338 CELLS/UL (ref 200–950)
MONOCYTES NFR BLD AUTO: 7.2 %
NEUTROPHILS # BLD AUTO: 2012 CELLS/UL (ref 1500–7800)
NEUTROPHILS NFR BLD AUTO: 42.8 %
NONHDLC SERPL-MCNC: 74 MG/DL (CALC)
PLATELET # BLD AUTO: 189 THOUSAND/UL (ref 140–400)
PMV BLD REES-ECKER: 11.4 FL (ref 7.5–12.5)
POTASSIUM SERPL-SCNC: 4.5 MMOL/L (ref 3.5–5.3)
PROT SERPL-MCNC: 6.8 G/DL (ref 6.1–8.1)
RBC # BLD AUTO: 4.95 MILLION/UL (ref 3.8–5.1)
SL AMB EGFR AFRICAN AMERICAN: 109 ML/MIN/1.73M2
SL AMB EGFR NON AFRICAN AMERICAN: 94 ML/MIN/1.73M2
SODIUM SERPL-SCNC: 139 MMOL/L (ref 135–146)
TRIGL SERPL-MCNC: 61 MG/DL
WBC # BLD AUTO: 4.7 THOUSAND/UL (ref 3.8–10.8)

## 2020-06-19 ENCOUNTER — TELEPHONE (OUTPATIENT)
Dept: HEMATOLOGY ONCOLOGY | Facility: HOSPITAL | Age: 50
End: 2020-06-19

## 2020-06-22 ENCOUNTER — OFFICE VISIT (OUTPATIENT)
Dept: HEMATOLOGY ONCOLOGY | Facility: HOSPITAL | Age: 50
End: 2020-06-22
Payer: COMMERCIAL

## 2020-06-22 VITALS
HEART RATE: 75 BPM | HEIGHT: 72 IN | TEMPERATURE: 97.6 F | SYSTOLIC BLOOD PRESSURE: 114 MMHG | BODY MASS INDEX: 34.67 KG/M2 | DIASTOLIC BLOOD PRESSURE: 74 MMHG | WEIGHT: 256 LBS | RESPIRATION RATE: 18 BRPM | OXYGEN SATURATION: 97 %

## 2020-06-22 DIAGNOSIS — E83.118 OTHER HEMOCHROMATOSIS: Primary | ICD-10-CM

## 2020-06-22 PROCEDURE — 99214 OFFICE O/P EST MOD 30 MIN: CPT | Performed by: INTERNAL MEDICINE

## 2020-06-22 PROCEDURE — 3008F BODY MASS INDEX DOCD: CPT | Performed by: INTERNAL MEDICINE

## 2020-06-22 PROCEDURE — 1036F TOBACCO NON-USER: CPT | Performed by: INTERNAL MEDICINE

## 2020-07-07 DIAGNOSIS — F98.8 ATTENTION DEFICIT DISORDER (ADD) WITHOUT HYPERACTIVITY: ICD-10-CM

## 2020-07-08 RX ORDER — DEXTROAMPHETAMINE SACCHARATE, AMPHETAMINE ASPARTATE MONOHYDRATE, DEXTROAMPHETAMINE SULFATE AND AMPHETAMINE SULFATE 7.5; 7.5; 7.5; 7.5 MG/1; MG/1; MG/1; MG/1
30 CAPSULE, EXTENDED RELEASE ORAL DAILY
Qty: 30 CAPSULE | Refills: 0 | Status: SHIPPED | OUTPATIENT
Start: 2020-07-08 | End: 2020-08-07 | Stop reason: SDUPTHER

## 2020-08-07 DIAGNOSIS — F98.8 ATTENTION DEFICIT DISORDER (ADD) WITHOUT HYPERACTIVITY: ICD-10-CM

## 2020-08-09 RX ORDER — DEXTROAMPHETAMINE SACCHARATE, AMPHETAMINE ASPARTATE MONOHYDRATE, DEXTROAMPHETAMINE SULFATE AND AMPHETAMINE SULFATE 7.5; 7.5; 7.5; 7.5 MG/1; MG/1; MG/1; MG/1
30 CAPSULE, EXTENDED RELEASE ORAL DAILY
Qty: 30 CAPSULE | Refills: 0 | Status: SHIPPED | OUTPATIENT
Start: 2020-08-09 | End: 2020-09-05 | Stop reason: SDUPTHER

## 2020-09-05 DIAGNOSIS — F98.8 ATTENTION DEFICIT DISORDER (ADD) WITHOUT HYPERACTIVITY: ICD-10-CM

## 2020-09-07 RX ORDER — DEXTROAMPHETAMINE SACCHARATE, AMPHETAMINE ASPARTATE MONOHYDRATE, DEXTROAMPHETAMINE SULFATE AND AMPHETAMINE SULFATE 7.5; 7.5; 7.5; 7.5 MG/1; MG/1; MG/1; MG/1
30 CAPSULE, EXTENDED RELEASE ORAL DAILY
Qty: 30 CAPSULE | Refills: 0 | Status: SHIPPED | OUTPATIENT
Start: 2020-09-07 | End: 2020-10-08 | Stop reason: SDUPTHER

## 2020-10-08 DIAGNOSIS — F98.8 ATTENTION DEFICIT DISORDER (ADD) WITHOUT HYPERACTIVITY: ICD-10-CM

## 2020-10-08 RX ORDER — DEXTROAMPHETAMINE SACCHARATE, AMPHETAMINE ASPARTATE MONOHYDRATE, DEXTROAMPHETAMINE SULFATE AND AMPHETAMINE SULFATE 7.5; 7.5; 7.5; 7.5 MG/1; MG/1; MG/1; MG/1
30 CAPSULE, EXTENDED RELEASE ORAL DAILY
Qty: 30 CAPSULE | Refills: 0 | Status: SHIPPED | OUTPATIENT
Start: 2020-10-08 | End: 2020-11-04 | Stop reason: SDUPTHER

## 2020-10-19 ENCOUNTER — TELEPHONE (OUTPATIENT)
Dept: HEMATOLOGY ONCOLOGY | Facility: HOSPITAL | Age: 50
End: 2020-10-19

## 2020-11-04 DIAGNOSIS — F98.8 ATTENTION DEFICIT DISORDER (ADD) WITHOUT HYPERACTIVITY: ICD-10-CM

## 2020-11-04 RX ORDER — DEXTROAMPHETAMINE SACCHARATE, AMPHETAMINE ASPARTATE MONOHYDRATE, DEXTROAMPHETAMINE SULFATE AND AMPHETAMINE SULFATE 7.5; 7.5; 7.5; 7.5 MG/1; MG/1; MG/1; MG/1
30 CAPSULE, EXTENDED RELEASE ORAL DAILY
Qty: 30 CAPSULE | Refills: 0 | Status: SHIPPED | OUTPATIENT
Start: 2020-11-04 | End: 2020-12-07 | Stop reason: SDUPTHER

## 2020-11-16 DIAGNOSIS — Z20.822 EXPOSURE TO COVID-19 VIRUS: Primary | ICD-10-CM

## 2020-11-16 DIAGNOSIS — Z20.822 EXPOSURE TO COVID-19 VIRUS: ICD-10-CM

## 2020-11-16 PROCEDURE — U0003 INFECTIOUS AGENT DETECTION BY NUCLEIC ACID (DNA OR RNA); SEVERE ACUTE RESPIRATORY SYNDROME CORONAVIRUS 2 (SARS-COV-2) (CORONAVIRUS DISEASE [COVID-19]), AMPLIFIED PROBE TECHNIQUE, MAKING USE OF HIGH THROUGHPUT TECHNOLOGIES AS DESCRIBED BY CMS-2020-01-R: HCPCS | Performed by: FAMILY MEDICINE

## 2020-11-17 LAB — SARS-COV-2 RNA SPEC QL NAA+PROBE: NOT DETECTED

## 2020-12-07 DIAGNOSIS — F98.8 ATTENTION DEFICIT DISORDER (ADD) WITHOUT HYPERACTIVITY: ICD-10-CM

## 2020-12-07 RX ORDER — DEXTROAMPHETAMINE SACCHARATE, AMPHETAMINE ASPARTATE MONOHYDRATE, DEXTROAMPHETAMINE SULFATE AND AMPHETAMINE SULFATE 7.5; 7.5; 7.5; 7.5 MG/1; MG/1; MG/1; MG/1
30 CAPSULE, EXTENDED RELEASE ORAL DAILY
Qty: 30 CAPSULE | Refills: 0 | Status: SHIPPED | OUTPATIENT
Start: 2020-12-07 | End: 2021-01-04 | Stop reason: SDUPTHER

## 2020-12-16 ENCOUNTER — TELEPHONE (OUTPATIENT)
Dept: HEMATOLOGY ONCOLOGY | Facility: HOSPITAL | Age: 50
End: 2020-12-16

## 2020-12-18 ENCOUNTER — TELEPHONE (OUTPATIENT)
Dept: HEMATOLOGY ONCOLOGY | Facility: CLINIC | Age: 50
End: 2020-12-18

## 2021-01-04 DIAGNOSIS — F98.8 ATTENTION DEFICIT DISORDER (ADD) WITHOUT HYPERACTIVITY: ICD-10-CM

## 2021-01-04 RX ORDER — DEXTROAMPHETAMINE SACCHARATE, AMPHETAMINE ASPARTATE MONOHYDRATE, DEXTROAMPHETAMINE SULFATE AND AMPHETAMINE SULFATE 7.5; 7.5; 7.5; 7.5 MG/1; MG/1; MG/1; MG/1
30 CAPSULE, EXTENDED RELEASE ORAL DAILY
Qty: 30 CAPSULE | Refills: 0 | Status: SHIPPED | OUTPATIENT
Start: 2021-01-04 | End: 2021-02-04 | Stop reason: SDUPTHER

## 2021-01-13 ENCOUNTER — LAB (OUTPATIENT)
Dept: LAB | Facility: HOSPITAL | Age: 51
End: 2021-01-13
Attending: INTERNAL MEDICINE
Payer: COMMERCIAL

## 2021-01-13 ENCOUNTER — TRANSCRIBE ORDERS (OUTPATIENT)
Dept: ADMINISTRATIVE | Facility: HOSPITAL | Age: 51
End: 2021-01-13

## 2021-01-13 DIAGNOSIS — E83.118 OTHER HEMOCHROMATOSIS: ICD-10-CM

## 2021-01-13 DIAGNOSIS — Z00.00 ROUTINE GENERAL MEDICAL EXAMINATION AT A HEALTH CARE FACILITY: Primary | ICD-10-CM

## 2021-01-13 DIAGNOSIS — Z00.00 ROUTINE GENERAL MEDICAL EXAMINATION AT A HEALTH CARE FACILITY: ICD-10-CM

## 2021-01-13 LAB
ALBUMIN SERPL BCP-MCNC: 3.8 G/DL (ref 3.5–5)
ALP SERPL-CCNC: 141 U/L (ref 46–116)
ALT SERPL W P-5'-P-CCNC: 27 U/L (ref 12–78)
ANION GAP SERPL CALCULATED.3IONS-SCNC: 1 MMOL/L (ref 4–13)
AST SERPL W P-5'-P-CCNC: 17 U/L (ref 5–45)
BASOPHILS # BLD AUTO: 0.02 THOUSANDS/ΜL (ref 0–0.1)
BASOPHILS NFR BLD AUTO: 0 % (ref 0–1)
BILIRUB SERPL-MCNC: 0.79 MG/DL (ref 0.2–1)
BUN SERPL-MCNC: 14 MG/DL (ref 5–25)
CALCIUM SERPL-MCNC: 9.3 MG/DL (ref 8.3–10.1)
CHLORIDE SERPL-SCNC: 106 MMOL/L (ref 100–108)
CO2 SERPL-SCNC: 31 MMOL/L (ref 21–32)
CREAT SERPL-MCNC: 0.69 MG/DL (ref 0.6–1.3)
EOSINOPHIL # BLD AUTO: 0.21 THOUSAND/ΜL (ref 0–0.61)
EOSINOPHIL NFR BLD AUTO: 4 % (ref 0–6)
ERYTHROCYTE [DISTWIDTH] IN BLOOD BY AUTOMATED COUNT: 12.2 % (ref 11.6–15.1)
FERRITIN SERPL-MCNC: 23 NG/ML (ref 8–388)
GFR SERPL CREATININE-BSD FRML MDRD: 101 ML/MIN/1.73SQ M
GLUCOSE P FAST SERPL-MCNC: 94 MG/DL (ref 65–99)
HCT VFR BLD AUTO: 48.3 % (ref 34.8–46.1)
HGB BLD-MCNC: 15.2 G/DL (ref 11.5–15.4)
IMM GRANULOCYTES # BLD AUTO: 0.01 THOUSAND/UL (ref 0–0.2)
IMM GRANULOCYTES NFR BLD AUTO: 0 % (ref 0–2)
IRON SATN MFR SERPL: 33 %
IRON SERPL-MCNC: 93 UG/DL (ref 50–170)
LYMPHOCYTES # BLD AUTO: 2.44 THOUSANDS/ΜL (ref 0.6–4.47)
LYMPHOCYTES NFR BLD AUTO: 42 % (ref 14–44)
MCH RBC QN AUTO: 31 PG (ref 26.8–34.3)
MCHC RBC AUTO-ENTMCNC: 31.5 G/DL (ref 31.4–37.4)
MCV RBC AUTO: 98 FL (ref 82–98)
MONOCYTES # BLD AUTO: 0.41 THOUSAND/ΜL (ref 0.17–1.22)
MONOCYTES NFR BLD AUTO: 7 % (ref 4–12)
NEUTROPHILS # BLD AUTO: 2.75 THOUSANDS/ΜL (ref 1.85–7.62)
NEUTS SEG NFR BLD AUTO: 47 % (ref 43–75)
NRBC BLD AUTO-RTO: 0 /100 WBCS
PLATELET # BLD AUTO: 228 THOUSANDS/UL (ref 149–390)
PMV BLD AUTO: 11.1 FL (ref 8.9–12.7)
POTASSIUM SERPL-SCNC: 4.5 MMOL/L (ref 3.5–5.3)
PROT SERPL-MCNC: 7.8 G/DL (ref 6.4–8.2)
RBC # BLD AUTO: 4.91 MILLION/UL (ref 3.81–5.12)
SODIUM SERPL-SCNC: 138 MMOL/L (ref 136–145)
TIBC SERPL-MCNC: 280 UG/DL (ref 250–450)
WBC # BLD AUTO: 5.84 THOUSAND/UL (ref 4.31–10.16)

## 2021-01-13 PROCEDURE — 85025 COMPLETE CBC W/AUTO DIFF WBC: CPT

## 2021-01-13 PROCEDURE — 82728 ASSAY OF FERRITIN: CPT

## 2021-01-13 PROCEDURE — 36415 COLL VENOUS BLD VENIPUNCTURE: CPT

## 2021-01-13 PROCEDURE — 80053 COMPREHEN METABOLIC PANEL: CPT

## 2021-01-13 PROCEDURE — 83550 IRON BINDING TEST: CPT

## 2021-01-13 PROCEDURE — 83918 ORGANIC ACIDS TOTAL QUANT: CPT

## 2021-01-13 PROCEDURE — 83540 ASSAY OF IRON: CPT

## 2021-01-14 NOTE — PROGRESS NOTES
Hematology/Oncology Outpatient Follow- up Note  [facility], 1970, [id number]  1/15/2021        Chief Complaint   Patient presents with    Follow-up       HPI:  Jewell Madrigal is a 44 yo female with hereditary hemochromatosis   She had a MRI completed on 2018 revealing liver iron concentration of 74 umol/g indicating slightly overload   Otherwise the MRI was normal  She has been undergoing phlebotomy at [facility]  Previous Hematologic/ Oncologic History:    Phlebotomy    Current Hematologic/ Oncologic Treatment:    Monthly phlebotomy    ECO - Asymptomatic    Interval History:   The patient presents for routine follow up  She was last seen by Dr Wright on 20  Most recent blood work completed on 21 was reviewed  Her hemoglobin 15 2, hematocrit 48 3  Ferritin 23  Iron saturation in 93  Last phlebotomy was done a month ago  She has had 3 phlebotomies over last 6 months  Patient states she feels well  She denies any concerning symptoms  No chest pain, shortness of breath, excessive fatigue  Test Results:    Imaging: No results found  Labs:   Lab Results   Component Value Date    WBC 5 84 2021    HGB 15 2 2021    HCT 48 3 (H) 2021    MCV 98 2021     2021     Lab Results   Component Value Date     2014    K 4 5 2021     2021    CO2 31 2021    ANIONGAP 1 (L) 2014    BUN 14 2021    CREATININE 0 69 2021    GLUCOSE 89 2014    GLUF 94 2021    CALCIUM 9 3 2021    AST 17 2021    ALT 27 2021    ALKPHOS 141 (H) 2021    PROT 6 9 2014    BILITOT 1 0 2014    EGFR 101 2021           Review of Systems   All other systems reviewed and are negative          Active Problems:   Patient Active Problem List   Diagnosis    ADD (attention deficit disorder)    Allergic rhinitis    Hereditary hemochromatosis ([facility] [address] )       Past Medical History:   Past Medical History:   Diagnosis Date    Hemochromatosis     IBS (irritable bowel syndrome)        Surgical History:   Past Surgical History:   Procedure Laterality Date    COLONOSCOPY      resolved 02/13;  repeat due in 10 years        Family History:  No family history on file  Cancer-related family history is not on file      Social History:   Social History     Socioeconomic History    Marital status: /Civil Union     Spouse name: Not on file    Number of children: Not on file    Years of education: Not on file    Highest education level: Not on file   Occupational History    Not on file   Social Needs    Financial resource strain: Not on file    Food insecurity     Worry: Not on file     Inability: Not on file   [employer] needs     Medical: Not on file     Non-medical: Not on file   Tobacco Use    Smoking status: Former Smoker    Smokeless tobacco: Never Used   Substance and Sexual Activity    Alcohol use: No    Drug use: No    Sexual activity: Not on file   Lifestyle    Physical activity     Days per week: Not on file     Minutes per session: Not on file    Stress: Not on file   Relationships    Social connections     Talks on phone: Not on file     Gets together: Not on file     Attends [employer]ism service: Not on file     Active member of club or organization: Not on file     Attends meetings of clubs or organizations: Not on file     Relationship status: Not on file    Intimate partner violence     Fear of current or ex partner: Not on file     Emotionally abused: Not on file     Physically abused: Not on file     Forced sexual activity: Not on file   Other Topics Concern    Not on file   Social History Narrative    Not on file       Current Medications:   Current Outpatient Medications   Medication Sig Dispense Refill    amphetamine-dextroamphetamine (ADDERALL XR) 30 MG 24 hr capsule Take 1 capsule (30 mg total) by mouth dailyMax Daily Amount: 30 mg 30 capsule 0    cetirizine (ZyrTEC) 10 mg tablet Take 10 mg by mouth daily      Cholecalciferol (VITAMIN D) 2000 units CAPS Take 1 capsule by mouth daily       No current facility-administered medications for this visit  Allergies: Allergies   Allergen Reactions    No Known Allergies        Physical Exam:  /72 (BP Location: Right arm)   Pulse 86   Temp (!) 97 4 °F (36 3 °C) (Temporal)   Resp 16   Ht 6' (1 829 m)   Wt 109 kg (240 lb)   SpO2 98%   BMI 32 55 kg/m²   Body surface area is 2 3 meters squared  Wt Readings from Last 3 Encounters:   01/15/21 109 kg (240 lb)   06/22/20 116 kg (256 lb)   06/11/20 119 kg (262 lb 3 2 oz)           Physical Exam  Constitutional:       Appearance: Normal appearance  HENT:      Head: Normocephalic and atraumatic  Eyes:      General: No scleral icterus  Right eye: No discharge  Left eye: No discharge  Conjunctiva/sclera: Conjunctivae normal    Neck:      Musculoskeletal: Normal range of motion  Cardiovascular:      Rate and Rhythm: Normal rate and regular rhythm  Pulmonary:      Effort: Pulmonary effort is normal       Breath sounds: Normal breath sounds  Abdominal:      General: Bowel sounds are normal       Palpations: Abdomen is soft  Musculoskeletal: Normal range of motion  Lymphadenopathy:      Cervical: No cervical adenopathy  Skin:     General: Skin is warm and dry  Neurological:      General: No focal deficit present  Mental Status: She is alert and oriented to person, place, and time  Psychiatric:         Mood and Affect: Mood normal          Behavior: Behavior normal          Assessment / Plan:    1  Hereditary hemochromatosis [facility])      The patient is a 46 yo female with hereditary hemochromatosis   She had a MRI completed on 05/17/2018 revealing liver iron concentration of 74 umol/g indicating slightly overload   Otherwise the MRI was normal  She has been undergoing phlebotomy at [facility] blood bank  She has donated blood 3 times in the last 6 months  Her recent blood work shows Hemoglobin 15 2, hematocrit 48 3  Ferritin 23  Iron saturation in 93  Patient's current parameters are monthly phlebotomy to maintain hematocrit 44  She was advised to continue going to [facility] blood bank on a monthly basis to donate a unit of blood (500 ML of whole blood)  I completed [facility] blood form and patient was provided with script with written parameters  She will return for follow up visit in 6 months with repeat blood work  Barriers to care: None  Patient able to self care  Portions of the record may have been created with voice recognition software  Occasional wrong word or "sound a like" substitutions may have occurred due to the inherent limitations of voice recognition software  Read the chart carefully and recognize, using context, where substitutions have occurred

## 2021-01-15 ENCOUNTER — OFFICE VISIT (OUTPATIENT)
Dept: HEMATOLOGY ONCOLOGY | Facility: HOSPITAL | Age: 51
End: 2021-01-15
Payer: COMMERCIAL

## 2021-01-15 VITALS
SYSTOLIC BLOOD PRESSURE: 108 MMHG | DIASTOLIC BLOOD PRESSURE: 72 MMHG | BODY MASS INDEX: 32.51 KG/M2 | HEART RATE: 86 BPM | RESPIRATION RATE: 16 BRPM | TEMPERATURE: 97.4 F | HEIGHT: 72 IN | WEIGHT: 240 LBS | OXYGEN SATURATION: 98 %

## 2021-01-15 DIAGNOSIS — E83.110 HEREDITARY HEMOCHROMATOSIS (HCC): Primary | ICD-10-CM

## 2021-01-15 PROCEDURE — 99214 OFFICE O/P EST MOD 30 MIN: CPT | Performed by: NURSE PRACTITIONER

## 2021-01-15 PROCEDURE — 3008F BODY MASS INDEX DOCD: CPT | Performed by: PODIATRIST

## 2021-01-16 LAB
METHYLMALONATE SERPL-SCNC: 185 NMOL/L (ref 0–378)
SL AMB DISCLAIMER: NORMAL

## 2021-01-20 ENCOUNTER — OFFICE VISIT (OUTPATIENT)
Dept: PODIATRY | Facility: CLINIC | Age: 51
End: 2021-01-20

## 2021-01-20 ENCOUNTER — APPOINTMENT (OUTPATIENT)
Dept: RADIOLOGY | Facility: CLINIC | Age: 51
End: 2021-01-20
Payer: COMMERCIAL

## 2021-01-20 DIAGNOSIS — M79.671 PAIN IN BOTH FEET: ICD-10-CM

## 2021-01-20 DIAGNOSIS — M79.672 PAIN IN BOTH FEET: ICD-10-CM

## 2021-01-20 DIAGNOSIS — M67.00 SHORT ACHILLES TENDON, UNSPECIFIED LATERALITY: ICD-10-CM

## 2021-01-20 DIAGNOSIS — M20.11 ACQUIRED HALLUX VALGUS, RIGHT: ICD-10-CM

## 2021-01-20 DIAGNOSIS — M20.12 ACQUIRED HALLUX VALGUS, LEFT: ICD-10-CM

## 2021-01-20 DIAGNOSIS — M72.2 PLANTAR FASCIITIS: Primary | ICD-10-CM

## 2021-01-20 PROCEDURE — 1036F TOBACCO NON-USER: CPT | Performed by: PODIATRIST

## 2021-01-20 PROCEDURE — 20550 NJX 1 TENDON SHEATH/LIGAMENT: CPT | Performed by: PODIATRIST

## 2021-01-20 PROCEDURE — 99203 OFFICE O/P NEW LOW 30 MIN: CPT | Performed by: PODIATRIST

## 2021-01-20 PROCEDURE — 73630 X-RAY EXAM OF FOOT: CPT

## 2021-01-20 NOTE — PROGRESS NOTES
PATIENT:  Sheryl Mckinley  1970       ASSESSMENT:     1  Plantar fasciitis  Foot injection   2  Acquired hallux valgus, right  XR foot 3+ vw right   3  Acquired hallux valgus, left  XR foot 3+ vw left   4  Pain in both feet     5  Short Achilles tendon, unspecified laterality             PLAN:  1  Patient was counseled and educated on the condition and the diagnosis  2  X-ray was obtained and personally reviewed  The radiological findings were discussed with the patient  3  The exam and symptoms are consistent with plantar fasciitis of left heel  She also has chronic HAV / bunion deformity  The diagnosis, treatment options and prognosis were discussed with the patient  4  Treatment options were discussed and the patient wished to proceed with an injection  Injection was given as in the procedure  5   Instructed supportive care, home exercise, icing, and proper footwear/ arch support  6  Discussed surgical options for her bunion deformity and she will consider it in the future  Surgical details and post-op course were discussed  7  Patient will return in 3 weeks for re-evaluation  Foot injection     Date/Time 1/20/2021 4:20 PM     Performed by  Doyle House DPM     Authorized by Doyle Hosue DPM      Universal Protocol   Consent: Verbal consent obtained  Risks and benefits: risks, benefits and alternatives were discussed  Consent given by: patient  Time out: Immediately prior to procedure a "time out" was called to verify the correct patient, procedure, equipment, support staff and site/side marked as required  Timeout called at: 1/20/2021 3:45 PM   Patient understanding: patient states understanding of the procedure being performed  Site marked: the operative site was marked  Patient identity confirmed: verbally with patient       Procedure Details   Procedure Notes:   Treatment options were discussed and the patient wished to proceed with an injection    A trigger point injection was given to left heel using 0 5cc Kenolog 40 and 2cc 1% Lidocaine pl  Instructed supportive care, icing, and resting  Patient tolerance: Patient tolerated the procedure well with no immediate complications               Subjective:       HPI  The patient presents with chief complaint of pain on left heel and bunion deformity  She started some pain on right arch last Summer  Pain is mostly resolved now with stretching exercise  Then, she started pain on left heel about 2 months ago  She has throbbing and stabbing sensation on left plantar heel  The symptoms have been worse since the onset  The patient has more pain in the morning and after sitting for a while  Pain also increases with walking and standing  The patient does not recall any injury  The patient tried OTC meds, stretching, and different shoes without relieving the pain  Denied any swelling  No associated numbness or paresthesia  She also has chronic bunion deformity, left worse than right  She has throbbing pain on left 1st MPJ  The following portions of the patient's history were reviewed and updated as appropriate: allergies, current medications, past family history, past medical history, past social history, past surgical history and problem list   All pertinent labs and images were reviewed        Past Medical History  Past Medical History:   Diagnosis Date    Hemochromatosis     IBS (irritable bowel syndrome)        Past Surgical History  Past Surgical History:   Procedure Laterality Date    COLONOSCOPY      resolved 02/13;  repeat due in 10 years         Allergies:  No known allergies    Medications:  Current Outpatient Medications   Medication Sig Dispense Refill    amphetamine-dextroamphetamine (ADDERALL XR) 30 MG 24 hr capsule Take 1 capsule (30 mg total) by mouth dailyMax Daily Amount: 30 mg 30 capsule 0    cetirizine (ZyrTEC) 10 mg tablet Take 10 mg by mouth daily      Cholecalciferol (VITAMIN D) 2000 units CAPS Take 1 capsule by mouth daily       No current facility-administered medications for this visit  Social History:  Social History     Socioeconomic History    Marital status: /Civil Union     Spouse name: None    Number of children: None    Years of education: None    Highest education level: None   Occupational History    None   Social Needs    Financial resource strain: None    Food insecurity     Worry: None     Inability: None    Transportation needs     Medical: None     Non-medical: None   Tobacco Use    Smoking status: Former Smoker    Smokeless tobacco: Never Used   Substance and Sexual Activity    Alcohol use: No    Drug use: No    Sexual activity: None   Lifestyle    Physical activity     Days per week: None     Minutes per session: None    Stress: None   Relationships    Social connections     Talks on phone: None     Gets together: None     Attends Islam service: None     Active member of club or organization: None     Attends meetings of clubs or organizations: None     Relationship status: None    Intimate partner violence     Fear of current or ex partner: None     Emotionally abused: None     Physically abused: None     Forced sexual activity: None   Other Topics Concern    None   Social History Narrative    None          Review of Systems   Constitutional: Negative for appetite change, chills and fever  HENT: Negative for sore throat  Respiratory: Negative for cough and shortness of breath  Cardiovascular: Negative for chest pain  Gastrointestinal: Negative for diarrhea, nausea and vomiting  Musculoskeletal: Negative for back pain and joint swelling  Skin: Negative for color change and wound  Allergic/Immunologic: Negative for immunocompromised state  Neurological: Negative for weakness and numbness  Hematological: Does not bruise/bleed easily  Psychiatric/Behavioral: Negative for behavioral problems and confusion  Objective: There were no vitals taken for this visit  Physical Exam  Vitals signs reviewed  Constitutional:       General: She is not in acute distress  Appearance: Normal appearance  She is not ill-appearing or toxic-appearing  HENT:      Head: Normocephalic and atraumatic  Neck:      Musculoskeletal: Normal range of motion and neck supple  Cardiovascular:      Rate and Rhythm: Normal rate and regular rhythm  Pulses: Normal pulses  Dorsalis pedis pulses are 2+ on the right side and 2+ on the left side  Posterior tibial pulses are 2+ on the right side and 2+ on the left side  Comments: No ischemia  CRT WNL  Pulmonary:      Effort: Pulmonary effort is normal  No respiratory distress  Musculoskeletal:         General: Tenderness and deformity present  No swelling or signs of injury  Right lower leg: No edema  Left lower leg: No edema  Right foot: Bunion present  No foot drop  Left foot: Bunion present  No foot drop  Comments: Focal pain on left plantar heel  Chronic bunion deformity noted, left worse the right  Pain presents at the prominent left 1st MPJ  Tight achilles tendon with equinus, left worse than right  Skin:     General: Skin is warm  Coloration: Skin is not cyanotic or mottled  Findings: No ecchymosis, erythema, petechiae, rash or wound  Rash is not purpuric  Nails: There is no clubbing  Neurological:      General: No focal deficit present  Mental Status: She is alert and oriented to person, place, and time  Cranial Nerves: No cranial nerve deficit  Sensory: No sensory deficit  Coordination: Coordination normal       Gait: Gait normal    Psychiatric:         Mood and Affect: Mood normal          Behavior: Behavior normal          Thought Content:  Thought content normal          Judgment: Judgment normal

## 2021-01-20 NOTE — LETTER
January 20, 2021     [address]  [address] [facility]  [address] [address]    Patient: Terrell Gonzalez   YOB: 1970   Date of Visit: 1/20/2021       Dear Dr Frances Morrow: Thank you for referring Vlad Cazares to me for evaluation  Below are my notes for this consultation  If you have questions, please do not hesitate to call me  I look forward to following your patient along with you  Sincerely,        Calixto Noe DPM        CC: No Recipients  ELIESER Ortiz [facility]  1/20/2021  4:21 PM  Sign when Signing Visit                 PATIENT:  Terrell Gonzalez  1970       ASSESSMENT:     1  Plantar fasciitis  Foot injection   2  Acquired hallux valgus, right  XR foot 3+ vw right   3  Acquired hallux valgus, left  XR foot 3+ vw left   4  Pain in both feet     5  Short Achilles tendon, unspecified laterality             PLAN:  1  Patient was counseled and educated on the condition and the diagnosis  2  X-ray was obtained and personally reviewed  The radiological findings were discussed with the patient  3  The exam and symptoms are consistent with plantar fasciitis of left heel  She also has chronic HAV / bunion deformity  The diagnosis, treatment options and prognosis were discussed with the patient  4  Treatment options were discussed and the patient wished to proceed with an injection  Injection was given as in the procedure  5   Instructed supportive care, home exercise, icing, and proper footwear/ arch support  6  Discussed surgical options for her bunion deformity and she will consider it in the future  Surgical details and post-op course were discussed  7  Patient will return in 3 weeks for re-evaluation  Foot injection     Date/Time 1/20/2021 4:20 PM     Performed by  Calixto Noe DPM     Authorized by Calixto Noe DPM      Universal Protocol   Consent: Verbal consent obtained    Risks and benefits: risks, benefits and alternatives were discussed  Consent given by: patient  Time out: Immediately prior to procedure a "time out" was called to verify the correct patient, procedure, equipment, support staff and site/side marked as required  Timeout called at: 1/20/2021 3:45 PM   Patient understanding: patient states understanding of the procedure being performed  Site marked: the operative site was marked  Patient identity confirmed: verbally with patient       Procedure Details   Procedure Notes:   Treatment options were discussed and the patient wished to proceed with an injection  A trigger point injection was given to left heel using 0 5cc Kenolog 40 and 2cc 1% Lidocaine pl  Instructed supportive care, icing, and resting  Patient tolerance: Patient tolerated the procedure well with no immediate complications               Subjective:       HPI  The patient presents with chief complaint of pain on left heel and bunion deformity  She started some pain on right arch last Summer  Pain is mostly resolved now with stretching exercise  Then, she started pain on left heel about 2 months ago  She has throbbing and stabbing sensation on left plantar heel  The symptoms have been worse since the onset  The patient has more pain in the morning and after sitting for a while  Pain also increases with walking and standing  The patient does not recall any injury  The patient tried OTC meds, stretching, and different shoes without relieving the pain  Denied any swelling  No associated numbness or paresthesia  She also has chronic bunion deformity, left worse than right  She has throbbing pain on left 1st MPJ  The following portions of the patient's history were reviewed and updated as appropriate: allergies, current medications, past family history, past medical history, past social history, past surgical history and problem list   All pertinent labs and images were reviewed        Past Medical History  Past Medical History:   Diagnosis Date    Hemochromatosis     IBS (irritable bowel syndrome)        Past Surgical History  Past Surgical History:   Procedure Laterality Date    COLONOSCOPY      resolved 02/13;  repeat due in 10 years         Allergies:  No known allergies    Medications:  Current Outpatient Medications   Medication Sig Dispense Refill    amphetamine-dextroamphetamine (ADDERALL XR) 30 MG 24 hr capsule Take 1 capsule (30 mg total) by mouth dailyMax Daily Amount: 30 mg 30 capsule 0    cetirizine (ZyrTEC) 10 mg tablet Take 10 mg by mouth daily      Cholecalciferol (VITAMIN D) 2000 units CAPS Take 1 capsule by mouth daily       No current facility-administered medications for this visit          Social History:  Social History     Socioeconomic History    Marital status: /Civil Union     Spouse name: None    Number of children: None    Years of education: None    Highest education level: None   Occupational History    None   Social Needs    Financial resource strain: None    Food insecurity     Worry: None     Inability: None    Transportation needs     Medical: None     Non-medical: None   Tobacco Use    Smoking status: Former Smoker    Smokeless tobacco: Never Used   Substance and Sexual Activity    Alcohol use: No    Drug use: No    Sexual activity: None   Lifestyle    Physical activity     Days per week: None     Minutes per session: None    Stress: None   Relationships    Social connections     Talks on phone: None     Gets together: None     Attends Muslim service: None     Active member of club or organization: None     Attends meetings of clubs or organizations: None     Relationship status: None    Intimate partner violence     Fear of current or ex partner: None     Emotionally abused: None     Physically abused: None     Forced sexual activity: None   Other Topics Concern    None   Social History Narrative    None          Review of Systems   Constitutional: Negative for appetite change, chills and fever  HENT: Negative for sore throat  Respiratory: Negative for cough and shortness of breath  Cardiovascular: Negative for chest pain  Gastrointestinal: Negative for diarrhea, nausea and vomiting  Musculoskeletal: Negative for back pain and joint swelling  Skin: Negative for color change and wound  Allergic/Immunologic: Negative for immunocompromised state  Neurological: Negative for weakness and numbness  Hematological: Does not bruise/bleed easily  Psychiatric/Behavioral: Negative for behavioral problems and confusion  Objective: There were no vitals taken for this visit  Physical Exam  Vitals signs reviewed  Constitutional:       General: She is not in acute distress  Appearance: Normal appearance  She is not ill-appearing or toxic-appearing  HENT:      Head: Normocephalic and atraumatic  Neck:      Musculoskeletal: Normal range of motion and neck supple  Cardiovascular:      Rate and Rhythm: Normal rate and regular rhythm  Pulses: Normal pulses  Dorsalis pedis pulses are 2+ on the right side and 2+ on the left side  Posterior tibial pulses are 2+ on the right side and 2+ on the left side  Comments: No ischemia  CRT WNL  Pulmonary:      Effort: Pulmonary effort is normal  No respiratory distress  Musculoskeletal:         General: Tenderness and deformity present  No swelling or signs of injury  Right lower leg: No edema  Left lower leg: No edema  Right foot: Bunion present  No foot drop  Left foot: Bunion present  No foot drop  Comments: Focal pain on left plantar heel  Chronic bunion deformity noted, left worse the right  Pain presents at the prominent left 1st MPJ  Tight achilles tendon with equinus, left worse than right  Skin:     General: Skin is warm  Coloration: Skin is not cyanotic or mottled  Findings: No ecchymosis, erythema, petechiae, rash or wound   Rash is not purpuric  Nails: There is no clubbing  Neurological:      General: No focal deficit present  Mental Status: She is alert and oriented to person, place, and time  Cranial Nerves: No cranial nerve deficit  Sensory: No sensory deficit  Coordination: Coordination normal       Gait: Gait normal    Psychiatric:         Mood and Affect: Mood normal          Behavior: Behavior normal          Thought Content:  Thought content normal          Judgment: Judgment normal

## 2021-02-04 DIAGNOSIS — F98.8 ATTENTION DEFICIT DISORDER (ADD) WITHOUT HYPERACTIVITY: ICD-10-CM

## 2021-02-04 RX ORDER — DEXTROAMPHETAMINE SACCHARATE, AMPHETAMINE ASPARTATE MONOHYDRATE, DEXTROAMPHETAMINE SULFATE AND AMPHETAMINE SULFATE 7.5; 7.5; 7.5; 7.5 MG/1; MG/1; MG/1; MG/1
30 CAPSULE, EXTENDED RELEASE ORAL DAILY
Qty: 30 CAPSULE | Refills: 0 | Status: SHIPPED | OUTPATIENT
Start: 2021-02-04 | End: 2021-03-03 | Stop reason: SDUPTHER

## 2021-02-10 ENCOUNTER — OFFICE VISIT (OUTPATIENT)
Dept: PODIATRY | Facility: CLINIC | Age: 51
End: 2021-02-10
Payer: COMMERCIAL

## 2021-02-10 ENCOUNTER — HOSPITAL ENCOUNTER (OUTPATIENT)
Dept: MAMMOGRAPHY | Facility: CLINIC | Age: 51
Discharge: HOME | End: 2021-02-10
Payer: COMMERCIAL

## 2021-02-10 VITALS
SYSTOLIC BLOOD PRESSURE: 114 MMHG | BODY MASS INDEX: 33.46 KG/M2 | HEART RATE: 69 BPM | DIASTOLIC BLOOD PRESSURE: 76 MMHG | HEIGHT: 72 IN | WEIGHT: 247 LBS

## 2021-02-10 VITALS — BODY MASS INDEX: 32.51 KG/M2 | HEIGHT: 72 IN | WEIGHT: 240 LBS

## 2021-02-10 DIAGNOSIS — Z12.31 ENCOUNTER FOR SCREENING MAMMOGRAM FOR BREAST CANCER: ICD-10-CM

## 2021-02-10 DIAGNOSIS — M20.11 ACQUIRED HALLUX VALGUS, RIGHT: ICD-10-CM

## 2021-02-10 DIAGNOSIS — M20.12 ACQUIRED HALLUX VALGUS, LEFT: ICD-10-CM

## 2021-02-10 DIAGNOSIS — M72.2 PLANTAR FASCIITIS: Primary | ICD-10-CM

## 2021-02-10 PROCEDURE — 77063 BREAST TOMOSYNTHESIS BI: CPT

## 2021-02-10 PROCEDURE — 99213 OFFICE O/P EST LOW 20 MIN: CPT | Performed by: PODIATRIST

## 2021-02-10 PROCEDURE — 77067 SCR MAMMO BI INCL CAD: CPT

## 2021-02-10 NOTE — PROGRESS NOTES
PATIENT:  Mela Campa  1970       ASSESSMENT:     1  Plantar fasciitis     2  Acquired hallux valgus, left     3  Acquired hallux valgus, right             PLAN:  1  Patient was counseled and educated on the condition and the diagnosis  2  The diagnosis, treatment options and prognosis were discussed with the patient  3  She responded to the treatment well  Instructed supportive care, home exercise, icing, and proper footwear/ arch support  4  She wishes to proceed with bunion surgery on left foot  Discussed different surgical options and she wished to proceed with Lapiplasty of left foot  Surgical details and post-op course were discussed  5  Will plan surgery in March  Subjective:       HPI  The patient presents for follow up on left heel pain  Her pain is 90-95% better  Slight symptoms on occasion now  She has chronic bunion on her feet, left worse than right and wishes to discuss surgical option  No new complaint  The following portions of the patient's history were reviewed and updated as appropriate: allergies, current medications, past family history, past medical history, past social history, past surgical history and problem list   All pertinent labs and images were reviewed        Past Medical History  Past Medical History:   Diagnosis Date    Hemochromatosis     IBS (irritable bowel syndrome)        Past Surgical History  Past Surgical History:   Procedure Laterality Date    COLONOSCOPY      resolved 02/13;  repeat due in 10 years         Allergies:  No known allergies    Medications:  Current Outpatient Medications   Medication Sig Dispense Refill    amphetamine-dextroamphetamine (ADDERALL XR) 30 MG 24 hr capsule Take 1 capsule (30 mg total) by mouth dailyMax Daily Amount: 30 mg 30 capsule 0    cetirizine (ZyrTEC) 10 mg tablet Take 10 mg by mouth daily      Cholecalciferol (VITAMIN D) 2000 units CAPS Take 1 capsule by mouth daily       No current facility-administered medications for this visit  Social History:  Social History     Socioeconomic History    Marital status: /Civil Union     Spouse name: None    Number of children: None    Years of education: None    Highest education level: None   Occupational History    None   Social Needs    Financial resource strain: None    Food insecurity     Worry: None     Inability: None    Transportation needs     Medical: None     Non-medical: None   Tobacco Use    Smoking status: Former Smoker    Smokeless tobacco: Never Used   Substance and Sexual Activity    Alcohol use: No    Drug use: No    Sexual activity: None   Lifestyle    Physical activity     Days per week: None     Minutes per session: None    Stress: None   Relationships    Social connections     Talks on phone: None     Gets together: None     Attends Denominational service: None     Active member of club or organization: None     Attends meetings of clubs or organizations: None     Relationship status: None    Intimate partner violence     Fear of current or ex partner: None     Emotionally abused: None     Physically abused: None     Forced sexual activity: None   Other Topics Concern    None   Social History Narrative    None          Review of Systems   Constitutional: Negative for appetite change, chills and fever  HENT: Negative for sore throat  Respiratory: Negative for cough and shortness of breath  Cardiovascular: Negative for chest pain  Gastrointestinal: Negative for diarrhea, nausea and vomiting  Musculoskeletal: Negative for back pain and joint swelling  Skin: Negative for color change and wound  Allergic/Immunologic: Negative for immunocompromised state  Neurological: Negative for weakness and numbness  Hematological: Does not bruise/bleed easily  Psychiatric/Behavioral: Negative for behavioral problems and confusion           Objective:      /76   Pulse 69   Ht 6' (1 829 m) Wt 112 kg (247 lb)   BMI 33 50 kg/m²          Physical Exam  Vitals signs reviewed  Constitutional:       General: She is not in acute distress  Appearance: Normal appearance  She is not ill-appearing or toxic-appearing  HENT:      Head: Normocephalic and atraumatic  Neck:      Musculoskeletal: Normal range of motion and neck supple  Cardiovascular:      Rate and Rhythm: Normal rate and regular rhythm  Pulses: Normal pulses  Dorsalis pedis pulses are 2+ on the right side and 2+ on the left side  Posterior tibial pulses are 2+ on the right side and 2+ on the left side  Comments: No ischemia  CRT WNL  Pulmonary:      Effort: Pulmonary effort is normal  No respiratory distress  Musculoskeletal:         General: Tenderness and deformity present  No swelling or signs of injury  Right lower leg: No edema  Left lower leg: No edema  Right foot: Bunion present  No foot drop  Left foot: Bunion present  No foot drop  Comments: Minimal pain on left plantar heel  Chronic bunion deformity noted, left worse the right  Pain presents at the prominent left 1st MPJ  Tight achilles tendon with equinus, left worse than right  Skin:     General: Skin is warm  Coloration: Skin is not cyanotic or mottled  Findings: No ecchymosis, erythema, petechiae, rash or wound  Rash is not purpuric  Nails: There is no clubbing  Neurological:      General: No focal deficit present  Mental Status: She is alert and oriented to person, place, and time  Cranial Nerves: No cranial nerve deficit  Sensory: No sensory deficit  Coordination: Coordination normal       Gait: Gait normal    Psychiatric:         Mood and Affect: Mood normal          Behavior: Behavior normal          Thought Content:  Thought content normal          Judgment: Judgment normal

## 2021-02-24 ENCOUNTER — PREP FOR PROCEDURE (OUTPATIENT)
Dept: PODIATRY | Facility: CLINIC | Age: 51
End: 2021-02-24

## 2021-02-24 DIAGNOSIS — M20.12 ACQUIRED HALLUX VALGUS OF LEFT FOOT: Primary | ICD-10-CM

## 2021-02-24 DIAGNOSIS — Z01.818 PRE-OP TESTING: ICD-10-CM

## 2021-02-24 RX ORDER — CHLORHEXIDINE GLUCONATE 0.12 MG/ML
15 RINSE ORAL ONCE
Status: CANCELLED | OUTPATIENT
Start: 2021-03-19

## 2021-02-24 RX ORDER — CHLORHEXIDINE GLUCONATE 4 G/100ML
SOLUTION TOPICAL DAILY PRN
Status: CANCELLED | OUTPATIENT
Start: 2021-03-19

## 2021-02-24 RX ORDER — CEFAZOLIN SODIUM 2 G/50ML
2000 SOLUTION INTRAVENOUS ONCE
Status: CANCELLED | OUTPATIENT
Start: 2021-03-19

## 2021-02-26 DIAGNOSIS — Z23 ENCOUNTER FOR IMMUNIZATION: ICD-10-CM

## 2021-03-01 ENCOUNTER — TRANSCRIBE ORDERS (OUTPATIENT)
Dept: ADMINISTRATIVE | Facility: HOSPITAL | Age: 51
End: 2021-03-01

## 2021-03-01 ENCOUNTER — OFFICE VISIT (OUTPATIENT)
Dept: FAMILY MEDICINE CLINIC | Facility: HOSPITAL | Age: 51
End: 2021-03-01
Payer: COMMERCIAL

## 2021-03-01 VITALS
BODY MASS INDEX: 34.13 KG/M2 | SYSTOLIC BLOOD PRESSURE: 128 MMHG | HEIGHT: 72 IN | TEMPERATURE: 97.1 F | DIASTOLIC BLOOD PRESSURE: 74 MMHG | OXYGEN SATURATION: 97 % | WEIGHT: 252 LBS | HEART RATE: 84 BPM

## 2021-03-01 DIAGNOSIS — Z01.818 PREOP EXAMINATION: Primary | ICD-10-CM

## 2021-03-01 DIAGNOSIS — L30.8 OTHER ECZEMA: ICD-10-CM

## 2021-03-01 DIAGNOSIS — Z01.818 PREOPERATIVE GENERAL PHYSICAL EXAMINATION: Primary | ICD-10-CM

## 2021-03-01 PROCEDURE — 99214 OFFICE O/P EST MOD 30 MIN: CPT | Performed by: NURSE PRACTITIONER

## 2021-03-01 RX ORDER — TRIAMCINOLONE ACETONIDE 1 MG/G
CREAM TOPICAL 2 TIMES DAILY
Qty: 30 G | Refills: 1 | Status: SHIPPED | OUTPATIENT
Start: 2021-03-01 | End: 2021-04-05 | Stop reason: SDUPTHER

## 2021-03-01 NOTE — PROGRESS NOTES
NAME: Stephania Pino  AGE: 45 y o  SEX: female  : 1970     DATE: 3/1/2021    [facility] Pre-Operative Evaluation      Chief Complaint: Pre-operative Evaluation     Surgery: 3/19  Anticipated Date of Surgery: Bunion repair  Referring Provider: Dr Ariana Vega     History of Present Illness:     Stephania Pino is a 45 y o  female who presents to the office today for a preoperative consultation at the request of surgeon, Dr Keenan Nunn, who plans on performing bunion repair on 3/19/21  Planned anesthesia is general  Patient has a bleeding risk of: no recent abnormal bleeding  Patient does not have objections to receiving blood products if needed  Current anti-platelet/anti-coagulation medications that the patient is prescribed includes: none  Assessment of Chronic Conditions:   none     Assessment of Cardiac Risk:  · Denies unstable or severe angina or MI in the last 6 weeks or history of stent placement in the last year   · Denies decompensated heart failure (e g  New onset heart failure, NYHA functional class IV heart failure, or worsening existing heart failure)  · Denies significant arrhythmias such as high grade AV block, symptomatic ventricular arrhythmia, newly recognized ventricular tachycardia, supraventricular tachycardia with resting heart rate >100, or symptomatic bradycardia  · Denies severe heart valve disease including aortic stenosis or symptomatic mitral stenosis     Exercise Capacity:  · Able to walk 4 blocks without symptoms?: Yes  · Able to walk 2 flights without symptoms?: Yes    Prior Anesthesia Reactions: No     Personal history of venous thromboembolic disease? No    History of steroid use for >2 weeks within last year? No         Review of Systems:     Review of Systems   Constitutional: Negative  HENT: Negative  Negative for congestion, dental problem, ear pain, hearing loss, postnasal drip, rhinorrhea, sinus pressure, sinus pain, sore throat, tinnitus and trouble swallowing  Respiratory: Negative  Cardiovascular: Negative  Gastrointestinal: Negative  Genitourinary: Negative  Musculoskeletal: Negative  Skin: Positive for rash (itchy rash on right palm and on back)  Allergic/Immunologic: Negative  Neurological: Negative  Hematological: Negative  Psychiatric/Behavioral: Negative  Current Problem List:     Patient Active Problem List   Diagnosis    ADD (attention deficit disorder)    Allergic rhinitis    Hereditary hemochromatosis ([facility] [address] )       Allergies:      Allergies   Allergen Reactions    No Known Allergies        Current Medications:       Current Outpatient Medications:     amphetamine-dextroamphetamine (ADDERALL XR) 30 MG 24 hr capsule, Take 1 capsule (30 mg total) by mouth dailyMax Daily Amount: 30 mg, Disp: 30 capsule, Rfl: 0    cetirizine (ZyrTEC) 10 mg tablet, Take 10 mg by mouth daily, Disp: , Rfl:     Cholecalciferol (VITAMIN D) 2000 units CAPS, Take 1 capsule by mouth daily, Disp: , Rfl:     Past Medical History:       Past Medical History:   Diagnosis Date    Hemochromatosis     IBS (irritable bowel syndrome)         Past Surgical History:   Procedure Laterality Date    COLONOSCOPY      resolved 02/13;  repeat due in 10 years         Family History   Problem Relation Age of Onset    Uterine cancer Mother 61    No Known Problems Father     No Known Problems Sister     No Known Problems Daughter     No Known Problems Maternal Grandmother     No Known Problems Maternal Grandfather     No Known Problems Paternal Grandmother     No Known Problems Paternal Grandfather     No Known Problems Daughter     No Known Problems Maternal Aunt     No Known Problems Paternal Aunt     No Known Problems Paternal Aunt         Social History     Socioeconomic History    Marital status: /Civil Union     Spouse name: Not on file    Number of children: Not on file    Years of education: Not on file    Highest education level: Not on file   Occupational History    Not on file   Social Needs    Financial resource strain: Not on file    Food insecurity     Worry: Not on file     Inability: Not on file    Transportation needs     Medical: Not on file     Non-medical: Not on file   Tobacco Use    Smoking status: Former Smoker    Smokeless tobacco: Never Used   Substance and Sexual Activity    Alcohol use: No    Drug use: No    Sexual activity: Not on file   Lifestyle    Physical activity     Days per week: Not on file     Minutes per session: Not on file    Stress: Not on file   Relationships    Social connections     Talks on phone: Not on file     Gets together: Not on file     Attends Yazdanism service: Not on file     Active member of club or organization: Not on file     Attends meetings of clubs or organizations: Not on file     Relationship status: Not on file    Intimate partner violence     Fear of current or ex partner: Not on file     Emotionally abused: Not on file     Physically abused: Not on file     Forced sexual activity: Not on file   Other Topics Concern    Not on file   Social History Narrative    Not on file        Physical Exam:     /74 (BP Location: Left arm, Patient Position: Sitting, Cuff Size: Standard)   Pulse 84   Temp (!) 97 1 °F (36 2 °C) (Tympanic)   Ht 6' (1 829 m)   Wt 114 kg (252 lb)   SpO2 97%   BMI 34 18 kg/m²     Physical Exam  Vitals signs reviewed  Constitutional:       Appearance: Normal appearance  She is well-developed  HENT:      Head: Normocephalic and atraumatic  Right Ear: Tympanic membrane, ear canal and external ear normal       Left Ear: Tympanic membrane, ear canal and external ear normal       Nose: Nose normal       Mouth/Throat:      Mouth: Mucous membranes are moist       Pharynx: Oropharynx is clear  Eyes:      Conjunctiva/sclera: Conjunctivae normal       Pupils: Pupils are equal, round, and reactive to light     Neck:      Musculoskeletal: Normal range of motion and neck supple  Thyroid: No thyromegaly  Vascular: No carotid bruit  Cardiovascular:      Rate and Rhythm: Normal rate and regular rhythm  Heart sounds: Normal heart sounds  No murmur  Pulmonary:      Effort: Pulmonary effort is normal       Breath sounds: Normal breath sounds  Abdominal:      General: Bowel sounds are normal       Palpations: Abdomen is soft  There is no hepatomegaly or splenomegaly  Tenderness: There is no abdominal tenderness  Musculoskeletal: Normal range of motion  Lymphadenopathy:      Cervical: No cervical adenopathy  Skin:     General: Skin is warm and dry  Neurological:      Mental Status: She is alert and oriented to person, place, and time  Psychiatric:         Mood and Affect: Mood normal          Behavior: Behavior normal          Thought Content: Thought content normal          Judgment: Judgment normal           Data:     Pre-operative work-up    Laboratory Results: I have personally reviewed the pertinent laboratory results/reports      EKG: I have personally reviewed pertinent reports  Chest x-ray: none ordered      Previous cardiopulmonary studies within the past year:  · Echocardiogram: N/A  · Cardiac Catheterization: N/A  · Stress Test: N/A  · Pulmonary Function Testing: N/A      Assessment & Recommendations:     No diagnosis found  Pre-Op Evaluation Assessment  45 y o  female with planned surgery: bunion repair  Known risk factors for perioperative complications: None  Current medications which may produce withdrawal symptoms if withheld perioperatively: none  Pre-Op Evaluation Plan  1  Further preoperative workup as follows:   - None; no further preoperative work-up is required    2  Medication Management/Recommendations:   - None, continue medication regimen including morning of surgery, with sip of water    3  Prophylaxis for cardiac events with perioperative beta-blockers: not indicated      4  Patient requires further consultation with: None    Clearance  Patient is CLEARED for surgery without any additional cardiac testing       Sukhi Sharp, [address] [facility] [address]  [address]  [address] [facility] [address]  Phone#  [phone]  Fax#  [phone]

## 2021-03-02 ENCOUNTER — LAB (OUTPATIENT)
Dept: LAB | Facility: HOSPITAL | Age: 51
End: 2021-03-02
Attending: PODIATRIST
Payer: COMMERCIAL

## 2021-03-02 DIAGNOSIS — Z01.818 PREOP EXAMINATION: ICD-10-CM

## 2021-03-02 LAB
ANION GAP SERPL CALCULATED.3IONS-SCNC: 3 MMOL/L (ref 4–13)
BUN SERPL-MCNC: 14 MG/DL (ref 5–25)
CALCIUM SERPL-MCNC: 9.2 MG/DL (ref 8.3–10.1)
CHLORIDE SERPL-SCNC: 108 MMOL/L (ref 100–108)
CO2 SERPL-SCNC: 30 MMOL/L (ref 21–32)
CREAT SERPL-MCNC: 0.63 MG/DL (ref 0.6–1.3)
GFR SERPL CREATININE-BSD FRML MDRD: 103 ML/MIN/1.73SQ M
GLUCOSE P FAST SERPL-MCNC: 98 MG/DL (ref 65–99)
POTASSIUM SERPL-SCNC: 4 MMOL/L (ref 3.5–5.3)
SODIUM SERPL-SCNC: 141 MMOL/L (ref 136–145)

## 2021-03-02 PROCEDURE — 80048 BASIC METABOLIC PNL TOTAL CA: CPT

## 2021-03-02 PROCEDURE — 36415 COLL VENOUS BLD VENIPUNCTURE: CPT

## 2021-03-03 ENCOUNTER — OFFICE VISIT (OUTPATIENT)
Dept: PODIATRY | Facility: CLINIC | Age: 51
End: 2021-03-03
Payer: COMMERCIAL

## 2021-03-03 VITALS — BODY MASS INDEX: 34.13 KG/M2 | HEIGHT: 72 IN | WEIGHT: 252 LBS | TEMPERATURE: 97.2 F

## 2021-03-03 DIAGNOSIS — F98.8 ATTENTION DEFICIT DISORDER (ADD) WITHOUT HYPERACTIVITY: ICD-10-CM

## 2021-03-03 DIAGNOSIS — M20.12 ACQUIRED HALLUX VALGUS, LEFT: Primary | ICD-10-CM

## 2021-03-03 PROCEDURE — 1036F TOBACCO NON-USER: CPT | Performed by: PODIATRIST

## 2021-03-03 PROCEDURE — 99213 OFFICE O/P EST LOW 20 MIN: CPT | Performed by: PODIATRIST

## 2021-03-03 PROCEDURE — 3008F BODY MASS INDEX DOCD: CPT | Performed by: PODIATRIST

## 2021-03-03 NOTE — PROGRESS NOTES
PATIENT:  Terrell Gonzalez  1970       ASSESSMENT:     1  Acquired hallux valgus, left  [place]           PLAN:  1  Patient was counseled and educated on the condition and the diagnosis  2  Reviewed the X-ray with her again  Explained surgical details and post-op course  Discussed all risks and complications related to the patient's condition and surgery  The benefits of surgery were also discussed  The patient understood that the surgery would not guarantee desirable outcome  All questions and concerns were addressed and the consent was signed  Subjective:       HPI  The patient presents for pre-op evaluation of left foot deformity  She has chronic bunion on her feet, left worse than right and wishes to proceed with surgery  She feels conservative care does not help her much at this time  No new complaint  The following portions of the patient's history were reviewed and updated as appropriate: allergies, current medications, past family history, past medical history, past social history, past surgical history and problem list   All pertinent labs and images were reviewed        Past Medical History  Past Medical History:   Diagnosis Date    Hemochromatosis     IBS (irritable bowel syndrome)        Past Surgical History  Past Surgical History:   Procedure Laterality Date    COLONOSCOPY      resolved 02/13;  repeat due in 10 years         Allergies:  No known allergies    Medications:  Current Outpatient Medications   Medication Sig Dispense Refill    amphetamine-dextroamphetamine (ADDERALL XR) 30 MG 24 hr capsule Take 1 capsule (30 mg total) by mouth dailyMax Daily Amount: 30 mg 30 capsule 0    cetirizine (ZyrTEC) 10 mg tablet Take 10 mg by mouth daily      Cholecalciferol (VITAMIN D) 2000 units CAPS Take 1 capsule by mouth daily      triamcinolone (KENALOG) 0 1 % cream Apply topically 2 (two) times a day 30 g 1     No current facility-administered medications for this visit  Social History:  Social History     Socioeconomic History    Marital status: /Civil Union     Spouse name: None    Number of children: None    Years of education: None    Highest education level: None   Occupational History    None   Social Needs    Financial resource strain: None    Food insecurity     Worry: None     Inability: None    Transportation needs     Medical: None     Non-medical: None   Tobacco Use    Smoking status: Former Smoker    Smokeless tobacco: Never Used   Substance and Sexual Activity    Alcohol use: No    Drug use: No    Sexual activity: None   Lifestyle    Physical activity     Days per week: None     Minutes per session: None    Stress: None   Relationships    Social connections     Talks on phone: None     Gets together: None     Attends Anabaptism service: None     Active member of club or organization: None     Attends meetings of clubs or organizations: None     Relationship status: None    Intimate partner violence     Fear of current or ex partner: None     Emotionally abused: None     Physically abused: None     Forced sexual activity: None   Other Topics Concern    None   Social History Narrative    None          Review of Systems   Constitutional: Negative for appetite change, chills and fever  HENT: Negative for sore throat  Respiratory: Negative for cough and shortness of breath  Cardiovascular: Negative for chest pain  Gastrointestinal: Negative for diarrhea, nausea and vomiting  Skin: Negative for color change and wound  Allergic/Immunologic: Negative for immunocompromised state  Hematological: Negative  Objective:      Temp (!) 97 2 °F (36 2 °C)   Ht 6' (1 829 m)   Wt 114 kg (252 lb)   BMI 34 18 kg/m²          Physical Exam  Vitals signs reviewed  Constitutional:       General: She is not in acute distress  Appearance: Normal appearance  She is not ill-appearing or toxic-appearing     Cardiovascular: Rate and Rhythm: Normal rate and regular rhythm  Pulses: Normal pulses  Dorsalis pedis pulses are 2+ on the right side and 2+ on the left side  Posterior tibial pulses are 2+ on the right side and 2+ on the left side  Comments: No ischemia  CRT WNL  Pulmonary:      Effort: Pulmonary effort is normal  No respiratory distress  Musculoskeletal:         General: Tenderness and deformity present  No swelling or signs of injury  Right lower leg: No edema  Left lower leg: No edema  Right foot: Bunion present  No foot drop  Left foot: Bunion present  No foot drop  Comments: Chronic bunion deformity noted, left worse the right  Pain presents at the prominent left 1st MPJ  Skin:     General: Skin is warm  Coloration: Skin is not cyanotic or mottled  Findings: No ecchymosis, erythema, petechiae, rash or wound  Rash is not purpuric  Nails: There is no clubbing  Neurological:      General: No focal deficit present  Mental Status: She is alert and oriented to person, place, and time  Cranial Nerves: No cranial nerve deficit  Sensory: No sensory deficit  Coordination: Coordination normal       Gait: Gait normal    Psychiatric:         Mood and Affect: Mood normal          Behavior: Behavior normal          Thought Content:  Thought content normal          Judgment: Judgment normal

## 2021-03-04 RX ORDER — DEXTROAMPHETAMINE SACCHARATE, AMPHETAMINE ASPARTATE MONOHYDRATE, DEXTROAMPHETAMINE SULFATE AND AMPHETAMINE SULFATE 7.5; 7.5; 7.5; 7.5 MG/1; MG/1; MG/1; MG/1
30 CAPSULE, EXTENDED RELEASE ORAL DAILY
Qty: 30 CAPSULE | Refills: 0 | Status: SHIPPED | OUTPATIENT
Start: 2021-03-04 | End: 2021-04-05 | Stop reason: SDUPTHER

## 2021-03-05 ENCOUNTER — IMMUNIZATIONS (OUTPATIENT)
Dept: FAMILY MEDICINE CLINIC | Facility: HOSPITAL | Age: 51
End: 2021-03-05

## 2021-03-05 DIAGNOSIS — Z23 ENCOUNTER FOR IMMUNIZATION: Primary | ICD-10-CM

## 2021-03-05 PROCEDURE — 0001A SARS-COV-2 / COVID-19 MRNA VACCINE (PFIZER-BIONTECH) 30 MCG: CPT

## 2021-03-05 PROCEDURE — 91300 SARS-COV-2 / COVID-19 MRNA VACCINE (PFIZER-BIONTECH) 30 MCG: CPT

## 2021-03-09 LAB — EXT SARS-COV-2: NEGATIVE

## 2021-03-17 RX ORDER — MULTIVITAMIN
1 TABLET ORAL DAILY
COMMUNITY

## 2021-03-17 NOTE — PRE-PROCEDURE INSTRUCTIONS
Pre-Surgery Instructions:   Medication Instructions    amphetamine-dextroamphetamine (ADDERALL XR) 30 MG 24 hr capsule Instructed patient per Anesthesia Guidelines  hold    cetirizine (ZyrTEC) 10 mg tablet Instructed patient per Anesthesia Guidelines  hold    Cholecalciferol (VITAMIN D) 2000 units CAPS Instructed patient per Anesthesia Guidelines  hold    Multiple Vitamin (multivitamin) tablet Instructed patient per Anesthesia Guidelines  hold    Probiotic Product (PROBIOTIC DAILY PO) Instructed patient per Anesthesia Guidelines  hold    triamcinolone (KENALOG) 0 1 % cream Instructed patient per Anesthesia Guidelines  hold    ZINC-VITAMIN C PO Instructed patient per Anesthesia Guidelines  hold    You will receive a phone call from hospital for arrival time  Please call surgeons office if any changes in your condition  Wear easy on/off clothing; consider type of surgery;  Valuables, jewelry, piercing's please keep at home  **COVID-19  education/surgical guidelines      Please: No contact lenses or eye make up, artificial eyelashes    Please secure transportation     Follow pre surgery showering or cleaning instructions as  Reviewed by nurse or surgeons office      Questions answered and concerns addressed

## 2021-03-19 ENCOUNTER — APPOINTMENT (OUTPATIENT)
Dept: RADIOLOGY | Facility: HOSPITAL | Age: 51
End: 2021-03-19
Payer: COMMERCIAL

## 2021-03-19 ENCOUNTER — ANESTHESIA (OUTPATIENT)
Dept: PERIOP | Facility: HOSPITAL | Age: 51
End: 2021-03-19
Payer: COMMERCIAL

## 2021-03-19 ENCOUNTER — ANESTHESIA EVENT (OUTPATIENT)
Dept: PERIOP | Facility: HOSPITAL | Age: 51
End: 2021-03-19
Payer: COMMERCIAL

## 2021-03-19 ENCOUNTER — HOSPITAL ENCOUNTER (OUTPATIENT)
Facility: HOSPITAL | Age: 51
Setting detail: OUTPATIENT SURGERY
Discharge: HOME | End: 2021-03-19
Attending: PODIATRIST | Admitting: PODIATRIST
Payer: COMMERCIAL

## 2021-03-19 VITALS
SYSTOLIC BLOOD PRESSURE: 132 MMHG | HEIGHT: 72 IN | TEMPERATURE: 97.7 F | DIASTOLIC BLOOD PRESSURE: 74 MMHG | HEART RATE: 60 BPM | RESPIRATION RATE: 16 BRPM | BODY MASS INDEX: 33.46 KG/M2 | OXYGEN SATURATION: 100 % | WEIGHT: 247 LBS

## 2021-03-19 DIAGNOSIS — Z98.890 POST-OPERATIVE STATE: Primary | ICD-10-CM

## 2021-03-19 PROCEDURE — 73620 X-RAY EXAM OF FOOT: CPT

## 2021-03-19 PROCEDURE — C9290 INJ, BUPIVACAINE LIPOSOME: HCPCS | Performed by: STUDENT IN AN ORGANIZED HEALTH CARE EDUCATION/TRAINING PROGRAM

## 2021-03-19 PROCEDURE — 73630 X-RAY EXAM OF FOOT: CPT

## 2021-03-19 PROCEDURE — 28297 COR HLX VLGS JT ARTHRD: CPT | Performed by: PODIATRIST

## 2021-03-19 PROCEDURE — C1713 ANCHOR/SCREW BN/BN,TIS/BN: HCPCS | Performed by: PODIATRIST

## 2021-03-19 PROCEDURE — 76000 FLUOROSCOPY <1 HR PHYS/QHP: CPT | Performed by: PODIATRIST

## 2021-03-19 PROCEDURE — 99024 POSTOP FOLLOW-UP VISIT: CPT | Performed by: PODIATRIST

## 2021-03-19 DEVICE — TRIPLANAR DEFORMITY CORRECTION
Type: IMPLANTABLE DEVICE | Site: FOOT | Status: FUNCTIONAL
Brand: CONTROL 360

## 2021-03-19 RX ORDER — CHLORHEXIDINE GLUCONATE 4 G/100ML
SOLUTION TOPICAL DAILY PRN
Status: DISCONTINUED | OUTPATIENT
Start: 2021-03-19 | End: 2021-03-22 | Stop reason: HOSPADM

## 2021-03-19 RX ORDER — MIDAZOLAM HYDROCHLORIDE 2 MG/2ML
INJECTION, SOLUTION INTRAMUSCULAR; INTRAVENOUS AS NEEDED
Status: DISCONTINUED | OUTPATIENT
Start: 2021-03-19 | End: 2021-03-19

## 2021-03-19 RX ORDER — PROPOFOL 10 MG/ML
INJECTION, EMULSION INTRAVENOUS CONTINUOUS PRN
Status: DISCONTINUED | OUTPATIENT
Start: 2021-03-19 | End: 2021-03-19

## 2021-03-19 RX ORDER — CHLORHEXIDINE GLUCONATE 0.12 MG/ML
15 RINSE ORAL ONCE
Status: DISCONTINUED | OUTPATIENT
Start: 2021-03-19 | End: 2021-03-22 | Stop reason: HOSPADM

## 2021-03-19 RX ORDER — OXYCODONE HYDROCHLORIDE 5 MG/1
5 TABLET ORAL EVERY 4 HOURS PRN
Status: DISCONTINUED | OUTPATIENT
Start: 2021-03-19 | End: 2021-03-22 | Stop reason: HOSPADM

## 2021-03-19 RX ORDER — ONDANSETRON 2 MG/ML
INJECTION INTRAMUSCULAR; INTRAVENOUS AS NEEDED
Status: DISCONTINUED | OUTPATIENT
Start: 2021-03-19 | End: 2021-03-19

## 2021-03-19 RX ORDER — OXYCODONE HYDROCHLORIDE AND ACETAMINOPHEN 5; 325 MG/1; MG/1
1 TABLET ORAL EVERY 4 HOURS PRN
Qty: 30 TABLET | Refills: 0 | Status: SHIPPED | OUTPATIENT
Start: 2021-03-19 | End: 2021-03-29

## 2021-03-19 RX ORDER — MAGNESIUM HYDROXIDE 1200 MG/15ML
LIQUID ORAL AS NEEDED
Status: DISCONTINUED | OUTPATIENT
Start: 2021-03-19 | End: 2021-03-19 | Stop reason: HOSPADM

## 2021-03-19 RX ORDER — CEFAZOLIN SODIUM 2 G/50ML
2000 SOLUTION INTRAVENOUS ONCE
Status: COMPLETED | OUTPATIENT
Start: 2021-03-19 | End: 2021-03-19

## 2021-03-19 RX ORDER — PROPOFOL 10 MG/ML
INJECTION, EMULSION INTRAVENOUS AS NEEDED
Status: DISCONTINUED | OUTPATIENT
Start: 2021-03-19 | End: 2021-03-19

## 2021-03-19 RX ORDER — SODIUM CHLORIDE, SODIUM LACTATE, POTASSIUM CHLORIDE, CALCIUM CHLORIDE 600; 310; 30; 20 MG/100ML; MG/100ML; MG/100ML; MG/100ML
INJECTION, SOLUTION INTRAVENOUS CONTINUOUS PRN
Status: DISCONTINUED | OUTPATIENT
Start: 2021-03-19 | End: 2021-03-19

## 2021-03-19 RX ORDER — ASPIRIN 325 MG
325 TABLET, DELAYED RELEASE (ENTERIC COATED) ORAL DAILY
Qty: 30 TABLET | Refills: 0 | Status: SHIPPED | OUTPATIENT
Start: 2021-03-19 | End: 2021-06-22 | Stop reason: ALTCHOICE

## 2021-03-19 RX ORDER — METOCLOPRAMIDE HYDROCHLORIDE 5 MG/ML
10 INJECTION INTRAMUSCULAR; INTRAVENOUS ONCE AS NEEDED
Status: DISCONTINUED | OUTPATIENT
Start: 2021-03-19 | End: 2021-03-19 | Stop reason: HOSPADM

## 2021-03-19 RX ORDER — KETAMINE HYDROCHLORIDE 50 MG/ML
INJECTION, SOLUTION, CONCENTRATE INTRAMUSCULAR; INTRAVENOUS AS NEEDED
Status: DISCONTINUED | OUTPATIENT
Start: 2021-03-19 | End: 2021-03-19

## 2021-03-19 RX ORDER — FENTANYL CITRATE 50 UG/ML
INJECTION, SOLUTION INTRAMUSCULAR; INTRAVENOUS AS NEEDED
Status: DISCONTINUED | OUTPATIENT
Start: 2021-03-19 | End: 2021-03-19

## 2021-03-19 RX ORDER — FENTANYL CITRATE/PF 50 MCG/ML
25 SYRINGE (ML) INJECTION
Status: DISCONTINUED | OUTPATIENT
Start: 2021-03-19 | End: 2021-03-19 | Stop reason: HOSPADM

## 2021-03-19 RX ADMIN — FENTANYL CITRATE 25 MCG: 50 INJECTION, SOLUTION INTRAMUSCULAR; INTRAVENOUS at 15:19

## 2021-03-19 RX ADMIN — KETAMINE HYDROCHLORIDE 10 MG: 50 INJECTION, SOLUTION INTRAMUSCULAR; INTRAVENOUS at 14:38

## 2021-03-19 RX ADMIN — FENTANYL CITRATE 50 MCG: 50 INJECTION, SOLUTION INTRAMUSCULAR; INTRAVENOUS at 14:05

## 2021-03-19 RX ADMIN — CEFAZOLIN SODIUM 2000 MG: 2 SOLUTION INTRAVENOUS at 12:30

## 2021-03-19 RX ADMIN — FENTANYL CITRATE 50 MCG: 50 INJECTION, SOLUTION INTRAMUSCULAR; INTRAVENOUS at 13:50

## 2021-03-19 RX ADMIN — ONDANSETRON 4 MG: 2 INJECTION INTRAMUSCULAR; INTRAVENOUS at 14:29

## 2021-03-19 RX ADMIN — PROPOFOL 50 MG: 10 INJECTION, EMULSION INTRAVENOUS at 12:38

## 2021-03-19 RX ADMIN — KETAMINE HYDROCHLORIDE 10 MG: 50 INJECTION, SOLUTION INTRAMUSCULAR; INTRAVENOUS at 13:10

## 2021-03-19 RX ADMIN — FENTANYL CITRATE 50 MCG: 50 INJECTION, SOLUTION INTRAMUSCULAR; INTRAVENOUS at 12:36

## 2021-03-19 RX ADMIN — KETAMINE HYDROCHLORIDE 10 MG: 50 INJECTION, SOLUTION INTRAMUSCULAR; INTRAVENOUS at 14:30

## 2021-03-19 RX ADMIN — KETAMINE HYDROCHLORIDE 10 MG: 50 INJECTION, SOLUTION INTRAMUSCULAR; INTRAVENOUS at 13:56

## 2021-03-19 RX ADMIN — PROPOFOL 80 MCG/KG/MIN: 10 INJECTION, EMULSION INTRAVENOUS at 12:40

## 2021-03-19 RX ADMIN — FENTANYL CITRATE 50 MCG: 50 INJECTION, SOLUTION INTRAMUSCULAR; INTRAVENOUS at 12:34

## 2021-03-19 RX ADMIN — SODIUM CHLORIDE, SODIUM LACTATE, POTASSIUM CHLORIDE, AND CALCIUM CHLORIDE: .6; .31; .03; .02 INJECTION, SOLUTION INTRAVENOUS at 14:32

## 2021-03-19 RX ADMIN — MIDAZOLAM 2 MG: 1 INJECTION INTRAMUSCULAR; INTRAVENOUS at 12:30

## 2021-03-19 RX ADMIN — KETAMINE HYDROCHLORIDE 10 MG: 50 INJECTION, SOLUTION INTRAMUSCULAR; INTRAVENOUS at 13:30

## 2021-03-19 RX ADMIN — SODIUM CHLORIDE, SODIUM LACTATE, POTASSIUM CHLORIDE, AND CALCIUM CHLORIDE: .6; .31; .03; .02 INJECTION, SOLUTION INTRAVENOUS at 12:49

## 2021-03-19 NOTE — ANESTHESIA PREPROCEDURE EVALUATION
Procedure:  BUNIONECTOMY LAPIPLASTY LEFT FOOT (Left Foot)    Relevant Problems   Other   (+) ADD (attention deficit disorder)   (+) Hereditary hemochromatosis (HCC)        Physical Exam    Airway    Mallampati score: I  TM Distance: >3 FB  Neck ROM: full     Dental   No notable dental hx     Cardiovascular  Rhythm: regular, Rate: normal, Cardiovascular exam normal    Pulmonary  Pulmonary exam normal Breath sounds clear to auscultation,     Other Findings        Anesthesia Plan  ASA Score- 2     Anesthesia Type- IV sedation with anesthesia with ASA Monitors  Additional Monitors:   Airway Plan:           Plan Factors-    Chart reviewed  Patient is not a current smoker  Induction- intravenous  Postoperative Plan- Plan for postoperative opioid use  Informed Consent- Anesthetic plan and risks discussed with patient  I personally reviewed this patient with the CRNA  Discussed and agreed on the Anesthesia Plan with the CRNA  Daniela Peña

## 2021-03-19 NOTE — DISCHARGE SUMMARY
Discharge Summary Outpatient Procedure Podiatry -   Eyal Lind 45 y o  female MRN: [id number]  Unit/Bed#: [facility] Encounter: [id number]    Admission Date: 3/19/2021     Admitting Diagnosis: Acquired hallux valgus of left foot [M20 12]    Discharge Diagnosis: same    Procedures Performed: BUNIONECTOMY LAPIPLASTY LEFT FOOT: 26092 (CPT®)    Complications: none    Condition at Discharge: stable    Discharge instructions/Information to patient and family:   See after visit summary for information provided to patient and family  Provisions for Follow-Up Care/Important appointments:  See after visit summary for information related to follow-up care and any pertinent home health orders  Discharge Medications:  See after visit summary for reconciled discharge medications provided to patient and family

## 2021-03-19 NOTE — OP NOTE
OPERATIVE REPORT - Podiatry  PATIENT NAME: Stacey Conrad    :  1970  MRN: [id number]  Pt Location:  OR ROOM 01    SURGERY DATE: 3/19/2021    Surgeon(s) and Role:     * Georges Jimenez DPM - Primary     * Zamzam Ramsay  89  Rashard Bal DPM - Assisting    Pre-op Diagnosis:  Acquired hallux valgus of left foot [M20 12]    Post-Op Diagnosis Codes:     * Acquired hallux valgus of left foot [M20 12]    Procedure(s) (LRB):  BUNIONECTOMY LAPIPLASTY LEFT FOOT (Left)    Specimen(s):  * No specimens in log *    Estimated Blood Loss:   Minimal    Drains:  * No LDAs found *    Anesthesia Type:   Choice with 20 ml of 1% Lidocaine and 0 5% Bupivacaine in a 1:1 mixture    Hemostasis:  Pneumatic ankle tourniquet set at 250 mmHg for 115 mins  Surgical dissection, direct compression, electrocautery    Materials:  Implant Name Type Inv  Item Serial No   Lot No  LRB No  Used Action   IMPLANT ANATOMIC BIPLANAR LAPIPLASTY SYS 1 - [id number]  IMPLANT ANATOMIC BIPLANAR LAPIPLASTY SYS 1  [facility] [id number] Left 1 Implanted     3-0 Vicryl  4-0 Vicryl  4-0 Stratafix    Injectables:  20 mL Exparel liposomal bupivacaine    Operative Findings:  Consistent with Diagnosis    Complications:   None    Procedure and Technique:     Under mild sedation, the patient was brought into the operating room and placed on the operating room table in the supinr position  IV sedation was achieved by anesthesia team and a universal timeout was performed where all parties are in agreement of correct patient, correct procedure and correct site  A pneumatic tourniquet was then placed over the patient's left lower extremity with ample padding  A proximal alexander block was performed consisting of 20 ml of 1% Lidocaine and 0 5% Bupivacaine in a 1:1 mixture  The foot was then prepped and draped in the usual aseptic manner   An esmarch bandage was used to exsangunate the foot and the pneumatic tourniquet was then inflated to 250 mmHg  Attention was directed to the left foot where an approximately 7cm linear incision was made over the 1st met cuneiform join extending to the first metatarsophalangeal  Dissection was continued through the subcutaneous layer, bleeders were cauterized as needed  Attention was then redirected to the first TMJ where a deep incision was carried down to bone  The periosteum was longitudinally incised and reflected away medially and laterally from the underlying first tarsometatarsal joint  A lateral release was performed via separate 2cm incision in 1st interspace  A 1st tarsometatarsal fusion was performed under 's protocol with [employer] Medical[employer] 3D bunion correction system  A joint release was performed running sagittal saw closely down 1st TMT to mobilize and plane the joint surfaces  The positioner was then applied to allow for IM angle correction as well as frontal plane rotation and sagittal alignment in correct position  The Lapiplasty cut guide was then secured and utilized to make precise joint cuts with triplanar correction held in place  The distractor was then applied to distract the joint for removal of bone slices and fenestration of joint surfaces  Utilizing compressor, the cut joint surfaces were brought together for controlled apposition and compression of the arthrodesis site  Biplanar plates were then applied dorsally and medially to provide multiplane fixation for rapid weight-bearing  The site was flushed with sterile saline  It was noted that the intermetatarsal angle was reduced within normal limits and the hallux sat in a normal anatomical position with good range of motion of the first MPJ  Final position was confirmed with C-arm fluoroscopy  Attention was then directed to the medial hypertrophy of the 1st metatarsal head  An inverted L capsulotomy was performed and the metatarsal head was exposed    There was noted to be an osteophyte within the medial capsule, which was resected in its entirety using sharp means  This was confirmed utilizing C-arm  Thereafter, by use of sagittal saw the medial eminence was resected  The surgical incision was irrigated with copious amounts of normal sterile saline  The periosteal and capsular structures were reapproximated using 3-0 Vicryl  Subcutaneous closure was obtained utilizing 4-0 Vicryl  Skin edges were reapproximated and closure was obtained utilizing 4-0 Stratafix in a running subcuticular fashion  The foot was then cleansed and dried  The incision site was reinforced utilizing Steri-Strips  A postoperative injection consisting of 20 ml of Exparel was performed  The incision site was dressed with Adaptic, gauze  This was then covered with a James  A stockinette was placed over the left lower extremity and was wrapped with cast padding  A below-knee posterior splint was applied  The tourniquet was deflated at approximately 115 min and normal hyperemic response was noted to all digits  The patient tolerated the procedure and anesthesia well without immediate complications and transferred to PACU with vital signs stable  Dr Roderick Charles was present during the entire procedure and participated in all key aspects  SIGNATURE: Catherine Brenner DPM  DATE: March 19, 2021  TIME: 3:12 PM      Portions of the record may have been created with voice recognition software  Occasional wrong word or "sound a like" substitutions may have occurred due to the inherent limitations of voice recognition software  Read the chart carefully and recognize, using context, where substitutions have occurred

## 2021-03-19 NOTE — TELEPHONE ENCOUNTER
No g-tube 28fr found in central stores.  md updated Requested medication(s) are due for refill today: Yes  Patient has already received a courtesy refill: No  Other reason request has been forwarded to provider:

## 2021-03-19 NOTE — ANESTHESIA POSTPROCEDURE EVALUATION
Post-Op Assessment Note    CV Status:  Stable  Pain Score: 0    Pain management: adequate     Mental Status:  Alert and awake   Hydration Status:  Stable   PONV Controlled:  None   Airway Patency:  Patent      Post Op Vitals Reviewed: Yes      Staff: CRNA         No complications documented      /82 (03/19/21 1503)    Temp (!) 97 °F (36 1 °C) (03/19/21 1503)    Pulse 70 (03/19/21 1503)   Resp 20 (03/19/21 1503)    SpO2

## 2021-03-19 NOTE — DISCHARGE INSTRUCTIONS
Noella Apley Dr Marcial Sella  Post-Operative Instructions    1  Take your prescribed medication as directed  2  Upon arrival at home, lie down and elevate your surgical foot on 2 pillows  3  Remain quiet, off your feet as much as possible, for the first 24-48 hours  This is when your feet first swell and may become painful  After 48 hours you may begin limited walking following these restrictions:   Nonweightbearing to surgical foot  4  Drink large quantities of water  Consume no alcohol  Continue a well-balanced diet  5  Report any unusual discomfort or fever to this office  6  A limited amount of discomfort and swelling is to be expected  In some cases the skin may take on a bruised appearance  The surgical solution that was applied to your foot prior to the operation is dark in color and the operation site may appear to be oozing when it actually is not  7  A slight amount of blood is to be expected, and is no cause for alarm  Do not remove the dressings  If there is active bleeding and if the bleeding persists, add additional gauze to the bandage, apply direct pressure, elevate your feet and call this office  8  Do not get the dressings wet  As regular bathing may be inconvenient, sponge baths are recommended  9  When anesthesia wears off and if any discomfort should be present, apply an ice pack directly over the operated area for 15 minute intervals for several hours or until the pain leaves  (USE IN EXCESS OF 15 MINUTES COULD CAUSE FROSTBITE)  Do not use hot water bags or electric pads  A convenient icepack can be made by placing ice cubes in a plastic bag and covering this with a towel  10  If necessary, take a mild laxative before retiring  11  Wear your special open shoes anytime you put weight on your foot, even if it is just to walk to the bathroom and back  It will probably be 2 or 3 weeks before you will be permitted to try regular shoes    12  Having performed the operation, we are interested in a prompt recovery  Please cooperate by following the above instructions  13  Please call to confirm your post-op appointment or call with any other questions

## 2021-03-23 ENCOUNTER — TELEPHONE (OUTPATIENT)
Dept: HEMATOLOGY ONCOLOGY | Facility: CLINIC | Age: 51
End: 2021-03-23

## 2021-03-23 NOTE — TELEPHONE ENCOUNTER
LVM for patient regarding the need to reschedule the 7/16 appointment to [email or web address]    Requested a return call to confirm

## 2021-03-29 ENCOUNTER — OFFICE VISIT (OUTPATIENT)
Dept: PODIATRY | Facility: CLINIC | Age: 51
End: 2021-03-29

## 2021-03-29 VITALS
DIASTOLIC BLOOD PRESSURE: 85 MMHG | HEIGHT: 72 IN | SYSTOLIC BLOOD PRESSURE: 129 MMHG | BODY MASS INDEX: 33.5 KG/M2 | HEART RATE: 79 BPM

## 2021-03-29 DIAGNOSIS — M20.12 ACQUIRED HALLUX VALGUS, LEFT: Primary | ICD-10-CM

## 2021-03-29 PROCEDURE — 99024 POSTOP FOLLOW-UP VISIT: CPT | Performed by: PODIATRIST

## 2021-03-29 NOTE — PROGRESS NOTES
PATIENT:  Jacquelyn Flores      1970    ASSESSMENT     1  Acquired hallux valgus, left            PLAN  Patient is doing well post-operatively  Sutures left intact  Incision was cleaned with betadine and DSD applied to be kept C/D/I  Continue post-op care as instructed  NWB left foot with CAM walker  Stressed on patient compliance about proper off-loading, staying off of feet, and proper dressing care  Call if any increase in pain, fevers, calf pain, shortness of breath, or general distress is noted  Patient instructed to go to ER if call is not returned immediately  RA in 1 week  HISTORY OF PRESENT ILLNESS  Patient presents for post-op appointment  Post-op pain is under control and resolving  The patient is feeling well and in good spirits  Patient reported no post-op concern  Patient relates compliance with surgical shoe, elevation, and keeping dressing clean, dry and intact  REVIEW OF SYSTEMS  GENERAL: No fever or chills  HEART: No chest pain, or palpitation  RESPIRATORY:  No SOB or cough  GI: No Nausea, vomit or diarrhea  NEUROLOGIC: No syncope or acute weakness  MUSCULOSKELETAL: No calf pain or edema  PHYSICAL EXAMINATION  GENERAL  The patient appears in NAD / non-toxic  Afebrile  VSS    VASCULAR EXAM  Pedal pulses and vascular status are intact  No calf pain or edema bilaterally  No cyanosis  DERMATOLOGIC EXAM  Incision is coapted and healing well  No signs of infection  No active drainage  Normal post-op edema and ecchymosis  No necrosis or dehiscence  NEUROLOGIC EXAM  AAO X 3  No focal neurologic deficit  Neurologic status is intact BLE  MUSCULOSKELETAL EXAM  Good surgical correction  Normal post-op findings  ROM intact  No fluctuation or crepitus

## 2021-03-30 ENCOUNTER — IMMUNIZATIONS (OUTPATIENT)
Dept: FAMILY MEDICINE CLINIC | Facility: HOSPITAL | Age: 51
End: 2021-03-30

## 2021-03-30 DIAGNOSIS — Z23 ENCOUNTER FOR IMMUNIZATION: Primary | ICD-10-CM

## 2021-03-30 PROCEDURE — 0002A SARS-COV-2 / COVID-19 MRNA VACCINE (PFIZER-BIONTECH) 30 MCG: CPT

## 2021-03-30 PROCEDURE — 91300 SARS-COV-2 / COVID-19 MRNA VACCINE (PFIZER-BIONTECH) 30 MCG: CPT

## 2021-04-05 ENCOUNTER — OFFICE VISIT (OUTPATIENT)
Dept: PODIATRY | Facility: CLINIC | Age: 51
End: 2021-04-05

## 2021-04-05 VITALS
DIASTOLIC BLOOD PRESSURE: 82 MMHG | HEART RATE: 92 BPM | HEIGHT: 72 IN | SYSTOLIC BLOOD PRESSURE: 144 MMHG | BODY MASS INDEX: 33.5 KG/M2

## 2021-04-05 DIAGNOSIS — M20.12 ACQUIRED HALLUX VALGUS, LEFT: Primary | ICD-10-CM

## 2021-04-05 DIAGNOSIS — L30.8 OTHER ECZEMA: ICD-10-CM

## 2021-04-05 DIAGNOSIS — F98.8 ATTENTION DEFICIT DISORDER (ADD) WITHOUT HYPERACTIVITY: ICD-10-CM

## 2021-04-05 PROCEDURE — 99024 POSTOP FOLLOW-UP VISIT: CPT | Performed by: PODIATRIST

## 2021-04-05 NOTE — PROGRESS NOTES
PATIENT:  Jazmine Nunn      1970    ASSESSMENT     1  Acquired hallux valgus, left            PLAN  Patient is doing well post-operatively  Continue post-op care as instructed  Heel WB with CAM walker  Okay to get her foot wet in shower  Instructed ROM exercise left foot and 1st MPJ  Call if any increase in pain, fevers, calf pain, shortness of breath, signs of infection, wound healing problem, or general distress is noted  Patient instructed to go to ER if call is not returned immediately  RA in 2 weeks  HISTORY OF PRESENT ILLNESS  Patient presents for post-op appointment  Post-op pain is under control and resolving well  The patient is feeling well and in good spirits  Patient reported no post-op concern  REVIEW OF SYSTEMS  GENERAL: No fever or chills  HEART: No chest pain, or palpitation  RESPIRATORY:  No SOB or cough  GI: No Nausea, vomit or diarrhea  NEUROLOGIC: No syncope or acute weakness  MUSCULOSKELETAL: No calf pain or edema  PHYSICAL EXAMINATION  GENERAL  The patient appears in NAD / non-toxic  Afebrile  VSS    VASCULAR EXAM  Pedal pulses and vascular status are intact  No calf pain or edema bilaterally  No cyanosis  DERMATOLOGIC EXAM  Incision is coapted and healed  No signs of infection  No active drainage  Decreased post-op edema and ecchymosis  No necrosis or dehiscence  NEUROLOGIC EXAM  AAO X 3  No focal neurologic deficit  Neurologic status is intact BLE  MUSCULOSKELETAL EXAM  Good surgical correction  Normal post-op findings  ROM intact  No fluctuation or crepitus

## 2021-04-06 RX ORDER — TRIAMCINOLONE ACETONIDE 1 MG/G
CREAM TOPICAL 2 TIMES DAILY
Qty: 30 G | Refills: 0 | Status: SHIPPED | OUTPATIENT
Start: 2021-04-06 | End: 2021-12-02 | Stop reason: SDUPTHER

## 2021-04-06 RX ORDER — DEXTROAMPHETAMINE SACCHARATE, AMPHETAMINE ASPARTATE MONOHYDRATE, DEXTROAMPHETAMINE SULFATE AND AMPHETAMINE SULFATE 7.5; 7.5; 7.5; 7.5 MG/1; MG/1; MG/1; MG/1
30 CAPSULE, EXTENDED RELEASE ORAL DAILY
Qty: 30 CAPSULE | Refills: 0 | Status: SHIPPED | OUTPATIENT
Start: 2021-04-06 | End: 2021-05-05 | Stop reason: SDUPTHER

## 2021-04-21 ENCOUNTER — APPOINTMENT (OUTPATIENT)
Dept: RADIOLOGY | Facility: CLINIC | Age: 51
End: 2021-04-21
Payer: COMMERCIAL

## 2021-04-21 ENCOUNTER — OFFICE VISIT (OUTPATIENT)
Dept: PODIATRY | Facility: CLINIC | Age: 51
End: 2021-04-21

## 2021-04-21 VITALS
BODY MASS INDEX: 33.46 KG/M2 | DIASTOLIC BLOOD PRESSURE: 75 MMHG | HEIGHT: 72 IN | WEIGHT: 247 LBS | SYSTOLIC BLOOD PRESSURE: 128 MMHG

## 2021-04-21 DIAGNOSIS — M20.12 ACQUIRED HALLUX VALGUS, LEFT: Primary | ICD-10-CM

## 2021-04-21 DIAGNOSIS — T81.89XA NON-HEALING SURGICAL WOUND, INITIAL ENCOUNTER: ICD-10-CM

## 2021-04-21 DIAGNOSIS — M20.12 ACQUIRED HALLUX VALGUS, LEFT: ICD-10-CM

## 2021-04-21 PROCEDURE — 99024 POSTOP FOLLOW-UP VISIT: CPT | Performed by: PODIATRIST

## 2021-04-21 PROCEDURE — 73630 X-RAY EXAM OF FOOT: CPT

## 2021-04-21 RX ORDER — SULFAMETHOXAZOLE AND TRIMETHOPRIM 800; 160 MG/1; MG/1
1 TABLET ORAL EVERY 12 HOURS SCHEDULED
Qty: 14 TABLET | Refills: 0 | Status: SHIPPED | OUTPATIENT
Start: 2021-04-21 | End: 2021-04-28

## 2021-04-21 NOTE — PROGRESS NOTES
PATIENT:  Shorty Lay      1970    ASSESSMENT     1  Acquired hallux valgus, left  XR foot 3+ vw left   2  Non-healing surgical wound, initial encounter  sulfamethoxazole-trimethoprim (BACTRIM DS) 800-160 mg per tablet          PLAN  X-ray was obtained and reviewed  She has mild wound dehiscence  Will use betadine and dry dressing bid  Rx: Bactrim DS to reduce any bioburden  Discussed proper protection and off-loading of the wound  Stressed on patient compliance about decrease walking to reduce any friction around the incision  Call if any increase in pain, fevers, calf pain, shortness of breath, signs of infection, wound healing problem, or general distress is noted  Patient instructed to go to ER if call is not returned immediately  RA in 1 week  HISTORY OF PRESENT ILLNESS  Patient presents for post-op appointment  She noticed some bleeding and wound on incision in the past week  It was rubbing against the boot  She has been putting neosporin  No purulence  REVIEW OF SYSTEMS  GENERAL: No fever or chills  HEART: No chest pain, or palpitation  RESPIRATORY:  No SOB or cough  GI: No Nausea, vomit or diarrhea  NEUROLOGIC: No syncope or acute weakness  MUSCULOSKELETAL: No calf pain or edema  PHYSICAL EXAMINATION  GENERAL  The patient appears in NAD / non-toxic  Afebrile  VSS    VASCULAR EXAM  Pedal pulses and vascular status are intact  No calf pain or edema bilaterally  No cyanosis  DERMATOLOGIC EXAM  Wound presents over left 1st MPJ  Wound is superficial   No deep probing  No abscess  No purulence  Minimal redness  Reduced post-op edema  NEUROLOGIC EXAM  AAO X 3  No focal neurologic deficit  Neurologic status is intact BLE  MUSCULOSKELETAL EXAM  Good surgical correction  ROM intact  No fluctuation or crepitus

## 2021-04-28 ENCOUNTER — OFFICE VISIT (OUTPATIENT)
Dept: PODIATRY | Facility: CLINIC | Age: 51
End: 2021-04-28

## 2021-04-28 VITALS
HEART RATE: 87 BPM | HEIGHT: 72 IN | BODY MASS INDEX: 33.44 KG/M2 | WEIGHT: 246.91 LBS | SYSTOLIC BLOOD PRESSURE: 122 MMHG | DIASTOLIC BLOOD PRESSURE: 78 MMHG

## 2021-04-28 DIAGNOSIS — T81.89XA NON-HEALING SURGICAL WOUND, INITIAL ENCOUNTER: Primary | ICD-10-CM

## 2021-04-28 PROCEDURE — 99024 POSTOP FOLLOW-UP VISIT: CPT | Performed by: PODIATRIST

## 2021-04-28 PROCEDURE — 3008F BODY MASS INDEX DOCD: CPT | Performed by: PODIATRIST

## 2021-04-28 NOTE — PROGRESS NOTES
PATIENT:  Carey Mcneill      1970    ASSESSMENT     1  Non-healing surgical wound, initial encounter            PLAN  Wound is mostly healed  Rest of suture removed on left dorsal foot  Continue local care for another week  Heel WB left with CAM walker  Discussed proper protection and off-loading of the wound  Stressed on patient compliance about decrease walking to reduce any friction around the incision  Call if any increase in pain, fevers, calf pain, shortness of breath, signs of infection, wound healing problem, or general distress is noted  Patient instructed to go to ER if call is not returned immediately  RA in 2 weeks  HISTORY OF PRESENT ILLNESS  Patient presents for post-op appointment  She feels well with decreased pain  Dorsal incision bothers her a little and she still noticed some sutures  Minimal drainage at this time  She tolerated antibiotics well with no side effects  REVIEW OF SYSTEMS  GENERAL: No fever or chills  HEART: No chest pain, or palpitation  RESPIRATORY:  No SOB or cough  GI: No Nausea, vomit or diarrhea  NEUROLOGIC: No syncope or acute weakness  MUSCULOSKELETAL: No calf pain or edema  PHYSICAL EXAMINATION  GENERAL  The patient appears in NAD / non-toxic  Afebrile  VSS    VASCULAR EXAM  Pedal pulses and vascular status are intact  No calf pain or edema bilaterally  No cyanosis  DERMATOLOGIC EXAM  Wound looks mostly healed with superficial eschar over left 1st MPJ  No drainage  No abscess  No purulence  Minimal redness  Residual sutures on left dorsal foot incision without dehiscence  NEUROLOGIC EXAM  AAO X 3  No focal neurologic deficit  Neurologic status is intact BLE  MUSCULOSKELETAL EXAM  Good surgical correction  ROM intact  No fluctuation or crepitus

## 2021-05-05 DIAGNOSIS — F98.8 ATTENTION DEFICIT DISORDER (ADD) WITHOUT HYPERACTIVITY: ICD-10-CM

## 2021-05-06 RX ORDER — DEXTROAMPHETAMINE SACCHARATE, AMPHETAMINE ASPARTATE MONOHYDRATE, DEXTROAMPHETAMINE SULFATE AND AMPHETAMINE SULFATE 7.5; 7.5; 7.5; 7.5 MG/1; MG/1; MG/1; MG/1
30 CAPSULE, EXTENDED RELEASE ORAL DAILY
Qty: 30 CAPSULE | Refills: 0 | Status: SHIPPED | OUTPATIENT
Start: 2021-05-06 | End: 2021-06-02 | Stop reason: SDUPTHER

## 2021-05-10 ENCOUNTER — OFFICE VISIT (OUTPATIENT)
Dept: PODIATRY | Facility: CLINIC | Age: 51
End: 2021-05-10

## 2021-05-10 VITALS
SYSTOLIC BLOOD PRESSURE: 120 MMHG | HEIGHT: 72 IN | DIASTOLIC BLOOD PRESSURE: 80 MMHG | WEIGHT: 250 LBS | BODY MASS INDEX: 33.86 KG/M2

## 2021-05-10 DIAGNOSIS — M20.12 ACQUIRED HALLUX VALGUS, LEFT: Primary | ICD-10-CM

## 2021-05-10 PROCEDURE — 99024 POSTOP FOLLOW-UP VISIT: CPT | Performed by: PODIATRIST

## 2021-05-10 NOTE — PROGRESS NOTES
PATIENT:  Carey Mcneill      1970    ASSESSMENT     1  Acquired hallux valgus, left            PLAN  Wound is healed  Okay for full WB left with CAM walker  She may try regular shoes around the house on the weekend  Coban for left foot compression  Instructed more elevation of left foot at work if possible  RA in 2 weeks  HISTORY OF PRESENT ILLNESS  Patient presents for post-op appointment  She feels well with minimal pain  Wound looks healed  No drainage  REVIEW OF SYSTEMS  GENERAL: No fever or chills  HEART: No chest pain, or palpitation  RESPIRATORY:  No SOB or cough  GI: No Nausea, vomit or diarrhea  NEUROLOGIC: No syncope or acute weakness  MUSCULOSKELETAL: No calf pain or edema  PHYSICAL EXAMINATION  GENERAL  The patient appears in NAD / non-toxic  Afebrile  VSS    VASCULAR EXAM  Pedal pulses and vascular status are intact  No calf pain or edema bilaterally  No cyanosis  DERMATOLOGIC EXAM  No wound presents  No redness  Residual post-op edema noted  NEUROLOGIC EXAM  AAO X 3  No focal neurologic deficit  Neurologic status is intact BLE  MUSCULOSKELETAL EXAM  Good surgical correction  Left 1st MPJ ROM improving  No fluctuation or crepitus

## 2021-05-18 ENCOUNTER — OFFICE VISIT (OUTPATIENT)
Dept: FAMILY MEDICINE CLINIC | Facility: HOSPITAL | Age: 51
End: 2021-05-18
Payer: COMMERCIAL

## 2021-05-18 VITALS — HEIGHT: 72 IN | BODY MASS INDEX: 33.18 KG/M2 | WEIGHT: 245 LBS

## 2021-05-18 DIAGNOSIS — J06.9 VIRAL UPPER RESPIRATORY TRACT INFECTION: Primary | ICD-10-CM

## 2021-05-18 PROCEDURE — 1036F TOBACCO NON-USER: CPT | Performed by: NURSE PRACTITIONER

## 2021-05-18 PROCEDURE — 3725F SCREEN DEPRESSION PERFORMED: CPT | Performed by: NURSE PRACTITIONER

## 2021-05-18 PROCEDURE — 3008F BODY MASS INDEX DOCD: CPT | Performed by: NURSE PRACTITIONER

## 2021-05-18 PROCEDURE — 99213 OFFICE O/P EST LOW 20 MIN: CPT | Performed by: NURSE PRACTITIONER

## 2021-05-18 NOTE — PROGRESS NOTES
Virtual Regular Visit      Assessment/Plan:    Problem List Items Addressed This Visit     None      Visit Diagnoses     Viral upper respiratory tract infection    -  Primary            Discussed that her symptoms are likely viral  Low suspicion for COVID given she is fully vaccinated  Pt is requesting antibiotic but with short duration of symptoms I advised against this  Instructed to call with significant worsening or no improvement in 1 week  Reason for visit is   Chief Complaint   Patient presents with    Sore Throat    Cough    Nasal Congestion    Virtual Regular Visit        Encounter provider Annabelle Cotto    Provider located at [address] [place] [address] MD  [address] [place] [address]      Recent Visits  No visits were found meeting these conditions  Showing recent visits within past 7 days and meeting all other requirements     Today's Visits  Date Type Provider Dept   05/18/21 Office Visit [facility]ATHALIE Cotto [facility]   Showing today's visits and meeting all other requirements     Future Appointments  No visits were found meeting these conditions  Showing future appointments within next 150 days and meeting all other requirements        The patient was identified by name and date of birth  Vik Aguirre was informed that this is a telemedicine visit and that the visit is being conducted through [address] [place] [address] Now and patient was informed that this is a secure, HIPAA-compliant platform  She agrees to proceed     My office door was closed  No one else was in the room  She acknowledged consent and understanding of privacy and security of the video platform  The patient has agreed to participate and understands they can discontinue the visit at any time  Patient is aware this is a billable service  Subjective  Vik Aguirre is a 45 y o  female    3 days ago started with sore throat and scratchy cough   Progressively getting somewhat worse  Post nasal drip  Bad taste in mucus which she states is always an indicator of a sinus infection for her  Has slight nasal congestion  No runny nose  Has bad allergies  Takes Zyrtec and Flonase daily  Cannot tolerate decongestants  Fully vaccinated  No fever or chills  No sob or chest tightness  No abd pain, N/V/D  No body aches or HA  Has taste and smell  Was around her grandchildren who have colds  Sore Throat   Associated symptoms include congestion and coughing  Pertinent negatives include no abdominal pain, diarrhea, headaches, shortness of breath or vomiting  Cough  Associated symptoms include postnasal drip and a sore throat  Pertinent negatives include no chills, fever, headaches, myalgias, shortness of breath or wheezing          Past Medical History:   Diagnosis Date    Hemochromatosis     IBS (irritable bowel syndrome)        Past Surgical History:   Procedure Laterality Date    COLONOSCOPY      resolved 02/13;  repeat due in 10 years     HYSTEROSCOPY W/ ENDOMETRIAL ABLATION      NASAL SEPTUM SURGERY      [facility] [address] W/SESMDC W/1METAR MEDIAL CNF Left 3/19/2021    Procedure: BUNIONECTOMY LAPIPLASTY LEFT FOOT;  Surgeon: Bulmaro Knight DPM;  Location:  MAIN OR;  Service: Podiatry       Current Outpatient Medications   Medication Sig Dispense Refill    amphetamine-dextroamphetamine (ADDERALL XR) 30 MG 24 hr capsule Take 1 capsule (30 mg total) by mouth dailyMax Daily Amount: 30 mg 30 capsule 0    cetirizine (ZyrTEC) 10 mg tablet Take 10 mg by mouth daily      Cholecalciferol (VITAMIN D) 2000 units CAPS Take 1 capsule by mouth daily      Multiple Vitamin (multivitamin) tablet Take 1 tablet by mouth daily      Probiotic Product (PROBIOTIC DAILY PO) Take by mouth      triamcinolone (KENALOG) 0 1 % cream Apply topically 2 (two) times a day 30 g 0    ZINC-VITAMIN C PO Take by mouth      aspirin (ECOTRIN) 325 mg EC tablet Take 1 tablet (325 mg total) by mouth daily (Patient not taking: Reported on 5/18/2021) 30 tablet 0     No current facility-administered medications for this visit  No Known Allergies    Review of Systems   Constitutional: Negative for chills, fatigue and fever  HENT: Positive for congestion, postnasal drip and sore throat  Respiratory: Positive for cough  Negative for shortness of breath and wheezing  Gastrointestinal: Negative for abdominal pain, diarrhea, nausea and vomiting  Musculoskeletal: Negative for myalgias  Neurological: Negative for headaches  Video Exam    Vitals:    05/18/21 1505   Weight: 111 kg (245 lb)   Height: 6' (1 829 m)       Physical Exam  Vitals signs reviewed  Constitutional:       General: She is not in acute distress  Appearance: She is well-developed  She is not ill-appearing  Pulmonary:      Effort: Pulmonary effort is normal    Neurological:      Mental Status: She is alert and oriented to person, place, and time  Psychiatric:         Mood and Affect: Mood normal          Behavior: Behavior normal           I spent 10 minutes directly with the patient during this visit      VIRTUAL VISIT Diego Ndiaye acknowledges that she has consented to an online visit or consultation  She understands that the online visit is based solely on information provided by her, and that, in the absence of a face-to-face physical evaluation by the physician, the diagnosis she receives is both limited and provisional in terms of accuracy and completeness  This is not intended to replace a full medical face-to-face evaluation by the physician  Barbette Gitelman understands and accepts these terms

## 2021-05-26 ENCOUNTER — OFFICE VISIT (OUTPATIENT)
Dept: PODIATRY | Facility: CLINIC | Age: 51
End: 2021-05-26

## 2021-05-26 ENCOUNTER — APPOINTMENT (OUTPATIENT)
Dept: RADIOLOGY | Facility: CLINIC | Age: 51
End: 2021-05-26
Payer: COMMERCIAL

## 2021-05-26 DIAGNOSIS — M20.12 ACQUIRED HALLUX VALGUS, LEFT: ICD-10-CM

## 2021-05-26 DIAGNOSIS — M20.12 ACQUIRED HALLUX VALGUS, LEFT: Primary | ICD-10-CM

## 2021-05-26 PROCEDURE — 99024 POSTOP FOLLOW-UP VISIT: CPT | Performed by: PODIATRIST

## 2021-05-26 PROCEDURE — 73630 X-RAY EXAM OF FOOT: CPT

## 2021-05-26 NOTE — PROGRESS NOTES
PATIENT:  Fabiano Weiss      1970    ASSESSMENT     1  Acquired hallux valgus, left  XR foot 3+ vw left          PLAN  X-ray was obtained and reviewed  Advance shoes and activity as tolerated  Continue home exercise / ROM exercise  Coban for compression  RA in 1 month  HISTORY OF PRESENT ILLNESS  Patient presents for post-op appointment  She feels well with no pain  She tolerates sneakers around the house  No wounds  REVIEW OF SYSTEMS  GENERAL: No fever or chills  HEART: No chest pain, or palpitation  RESPIRATORY:  No SOB or cough  GI: No Nausea, vomit or diarrhea  NEUROLOGIC: No syncope or acute weakness  MUSCULOSKELETAL: No calf pain or edema  PHYSICAL EXAMINATION  GENERAL  The patient appears in NAD / non-toxic  Afebrile  VSS    VASCULAR EXAM  Pedal pulses and vascular status are intact  No calf pain or edema bilaterally  No cyanosis  DERMATOLOGIC EXAM  No wound presents  No redness  Decreased post-op edema noted  NEUROLOGIC EXAM  AAO X 3  No focal neurologic deficit  Neurologic status is intact BLE  MUSCULOSKELETAL EXAM  Good surgical correction  Left 1st MPJ ROM improving  No fluctuation or crepitus

## 2021-06-02 DIAGNOSIS — F98.8 ATTENTION DEFICIT DISORDER (ADD) WITHOUT HYPERACTIVITY: ICD-10-CM

## 2021-06-03 RX ORDER — DEXTROAMPHETAMINE SACCHARATE, AMPHETAMINE ASPARTATE MONOHYDRATE, DEXTROAMPHETAMINE SULFATE AND AMPHETAMINE SULFATE 7.5; 7.5; 7.5; 7.5 MG/1; MG/1; MG/1; MG/1
30 CAPSULE, EXTENDED RELEASE ORAL DAILY
Qty: 30 CAPSULE | Refills: 0 | Status: SHIPPED | OUTPATIENT
Start: 2021-06-03 | End: 2021-07-01 | Stop reason: SDUPTHER

## 2021-06-22 ENCOUNTER — OFFICE VISIT (OUTPATIENT)
Dept: FAMILY MEDICINE CLINIC | Facility: HOSPITAL | Age: 51
End: 2021-06-22
Payer: COMMERCIAL

## 2021-06-22 VITALS
HEART RATE: 80 BPM | HEIGHT: 72 IN | WEIGHT: 260.2 LBS | TEMPERATURE: 97.9 F | DIASTOLIC BLOOD PRESSURE: 82 MMHG | BODY MASS INDEX: 35.24 KG/M2 | SYSTOLIC BLOOD PRESSURE: 122 MMHG | OXYGEN SATURATION: 97 %

## 2021-06-22 DIAGNOSIS — Z12.11 SCREEN FOR COLON CANCER: ICD-10-CM

## 2021-06-22 DIAGNOSIS — E83.110 HEREDITARY HEMOCHROMATOSIS (HCC): ICD-10-CM

## 2021-06-22 DIAGNOSIS — Z01.84 IMMUNITY STATUS TESTING: ICD-10-CM

## 2021-06-22 DIAGNOSIS — Z13.220 SCREENING CHOLESTEROL LEVEL: ICD-10-CM

## 2021-06-22 DIAGNOSIS — F98.8 ATTENTION DEFICIT DISORDER, UNSPECIFIED HYPERACTIVITY PRESENCE: ICD-10-CM

## 2021-06-22 DIAGNOSIS — R06.83 SNORING: ICD-10-CM

## 2021-06-22 DIAGNOSIS — Z00.00 ANNUAL PHYSICAL EXAM: Primary | ICD-10-CM

## 2021-06-22 DIAGNOSIS — Z12.83 SCREENING EXAM FOR SKIN CANCER: ICD-10-CM

## 2021-06-22 PROCEDURE — 99396 PREV VISIT EST AGE 40-64: CPT | Performed by: NURSE PRACTITIONER

## 2021-06-22 PROCEDURE — 3725F SCREEN DEPRESSION PERFORMED: CPT | Performed by: NURSE PRACTITIONER

## 2021-06-22 NOTE — PATIENT INSTRUCTIONS
Wellness Visit for Adults   AMBULATORY CARE:   A wellness visit  is when you see your healthcare provider to get screened for health problems  Your healthcare provider will also give you advice on how to stay healthy  Write down your questions so you remember to ask them  Ask your healthcare provider how often you should have a wellness visit  What happens at a wellness visit:  Your healthcare provider will ask about your health, and your family history of health problems  This includes high blood pressure, heart disease, and cancer  He or she will ask if you have symptoms that concern you, if you smoke, and about your mood  You may also be asked about your intake of medicines, supplements, food, and alcohol  Any of the following may be done:  · Your weight  will be checked  Your height may also be checked so your body mass index (BMI) can be calculated  Your BMI shows if you are at a healthy weight  · Your blood pressure  and heart rate will be checked  Your temperature may also be checked  · Blood and urine tests  may be done  Blood tests may be done to check your cholesterol levels  Abnormal cholesterol levels increase your risk for heart disease and stroke  You may also need a blood or urine test to check for diabetes if you are at increased risk  Urine tests may be done to look for signs of an infection or kidney disease  · A physical exam  includes checking your heartbeat and lungs with a stethoscope  Your healthcare provider may also check your skin to look for sun damage  · Screening tests  may be recommended  A screening test is done to check for diseases that may not cause symptoms  The screening tests you may need depend on your age, gender, family history, and lifestyle habits  For example, colorectal screening may be recommended if you are 48years old or older  Screening tests you need if you are a woman:   · A Pap smear  is used to screen for cervical cancer   Pap smears are usually done every 3 to 5 years depending on your age  You may need them more often if you have had abnormal Pap smear test results in the past  Ask your healthcare provider how often you should have a Pap smear  · A mammogram  is an x-ray of your breasts to screen for breast cancer  Experts recommend mammograms every 2 years starting at age 48 years  You may need a mammogram at age 52 years or younger if you have an increased risk for breast cancer  Talk to your healthcare provider about when you should start having mammograms and how often you need them  Vaccines you may need:   · Get an influenza vaccine  every year  The influenza vaccine protects you from the flu  Several types of viruses cause the flu  The viruses change over time, so new vaccines are made each year  · Get a tetanus-diphtheria (Td) booster vaccine  every 10 years  This vaccine protects you against tetanus and diphtheria  Tetanus is a severe infection that may cause painful muscle spasms and lockjaw  Diphtheria is a severe bacterial infection that causes a thick covering in the back of your mouth and throat  · Get a human papillomavirus (HPV) vaccine  if you are female and aged 23 to 32 or male 23 to 24 and never received it  This vaccine protects you from HPV infection  HPV is the most common infection spread by sexual contact  HPV may also cause vaginal, penile, and anal cancers  · Get a pneumococcal vaccine  if you are aged 72 years or older  The pneumococcal vaccine is an injection given to protect you from pneumococcal disease  Pneumococcal disease is an infection caused by pneumococcal bacteria  The infection may cause pneumonia, meningitis, or an ear infection  · Get a shingles vaccine  if you are 60 or older, even if you have had shingles before  The shingles vaccine is an injection to protect you from the varicella-zoster virus  This is the same virus that causes chickenpox   Shingles is a painful rash that develops in people who had chickenpox or have been exposed to the virus  How to eat healthy:  My Plate is a model for planning healthy meals  It shows the types and amounts of foods that should go on your plate  Fruits and vegetables make up about half of your plate, and grains and protein make up the other half  A serving of dairy is included on the side of your plate  The amount of calories and serving sizes you need depends on your age, gender, weight, and height  Examples of healthy foods are listed below:  · Eat a variety of vegetables  such as dark green, red, and orange vegetables  You can also include canned vegetables low in sodium (salt) and frozen vegetables without added butter or sauces  · Eat a variety of fresh fruits , canned fruit in 100% juice, frozen fruit, and dried fruit  · Include whole grains  At least half of the grains you eat should be whole grains  Examples include whole-wheat bread, wheat pasta, brown rice, and whole-grain cereals such as oatmeal     · Eat a variety of protein foods such as seafood (fish and shellfish), lean meat, and poultry without skin (turkey and chicken)  Examples of lean meats include pork leg, shoulder, or tenderloin, and beef round, sirloin, tenderloin, and extra lean ground beef  Other protein foods include eggs and egg substitutes, beans, peas, soy products, nuts, and seeds  · Choose low-fat dairy products such as skim or 1% milk or low-fat yogurt, cheese, and cottage cheese  · Limit unhealthy fats  such as butter, hard margarine, and shortening  Exercise:  Exercise at least 30 minutes per day on most days of the week  Some examples of exercise include walking, biking, dancing, and swimming  You can also fit in more physical activity by taking the stairs instead of the elevator or parking farther away from stores  Include muscle strengthening activities 2 days each week  Regular exercise provides many health benefits   It helps you manage your weight, and decreases your risk for type 2 diabetes, heart disease, stroke, and high blood pressure  Exercise can also help improve your mood  Ask your healthcare provider about the best exercise plan for you  General health and safety guidelines:   · Do not smoke  Nicotine and other chemicals in cigarettes and cigars can cause lung damage  Ask your healthcare provider for information if you currently smoke and need help to quit  E-cigarettes or smokeless tobacco still contain nicotine  Talk to your healthcare provider before you use these products  · Limit alcohol  A drink of alcohol is 12 ounces of beer, 5 ounces of wine, or 1½ ounces of liquor  · Lose weight, if needed  Being overweight increases your risk of certain health conditions  These include heart disease, high blood pressure, type 2 diabetes, and certain types of cancer  · Protect your skin  Do not sunbathe or use tanning beds  Use sunscreen with a SPF 15 or higher  Apply sunscreen at least 15 minutes before you go outside  Reapply sunscreen every 2 hours  Wear protective clothing, hats, and sunglasses when you are outside  · Drive safely  Always wear your seatbelt  Make sure everyone in your car wears a seatbelt  A seatbelt can save your life if you are in an accident  Do not use your cell phone when you are driving  This could distract you and cause an accident  Pull over if you need to make a call or send a text message  · Practice safe sex  Use latex condoms if are sexually active and have more than one partner  Your healthcare provider may recommend screening tests for sexually transmitted infections (STIs)  · Wear helmets, lifejackets, and protective gear  Always wear a helmet when you ride a bike or motorcycle, go skiing, or play sports that could cause a head injury  Wear protective equipment when you play sports  Wear a lifejacket when you are on a boat or doing water sports      © Copyright [employer] 2020 Information is for End User's use only and may not be sold, redistributed or otherwise used for commercial purposes  All illustrations and images included in CareNotes® are the copyrighted property of A D A M , Inc  or Luis Antonio Arias  The above information is an  only  It is not intended as medical advice for individual conditions or treatments  Talk to your doctor, nurse or pharmacist before following any medical regimen to see if it is safe and effective for you  Obesity   AMBULATORY CARE:   Obesity  is when your body mass index (BMI) is greater than 30  Your healthcare provider will use your height and weight to measure your BMI  The risks of obesity include  many health problems, such as injuries or physical disability  You may need tests to check for the following:  · Diabetes    · High blood pressure or high cholesterol    · Heart disease    · Gallbladder or liver disease    · Cancer of the colon, breast, prostate, liver, or kidney    · Sleep apnea    · Arthritis or gout    Seek care immediately if:   · You have a severe headache, confusion, or difficulty speaking  · You have weakness on one side of your body  · You have chest pain, sweating, or shortness of breath  Contact your healthcare provider if:   · You have symptoms of gallbladder or liver disease, such as pain in your upper abdomen  · You have knee or hip pain and discomfort while walking  · You have symptoms of diabetes, such as intense hunger and thirst, and frequent urination  · You have symptoms of sleep apnea, such as snoring or daytime sleepiness  · You have questions or concerns about your condition or care  Treatment for obesity  focuses on helping you lose weight to improve your health  Even a small decrease in BMI can reduce the risk for many health problems  Your healthcare provider will help you set a weight-loss goal   · Lifestyle changes  are the first step in treating obesity   These include making healthy food choices and getting regular physical activity  Your healthcare provider may suggest a weight-loss program that involves coaching, education, and therapy  · Medicine  may help you lose weight when it is used with a healthy diet and physical activity  · Surgery  can help you lose weight if you are very obese and have other health problems  There are several types of weight-loss surgery  Ask your healthcare provider for more information  Be successful losing weight:   · Set small, realistic goals  An example of a small goal is to walk for 20 minutes 5 days a week  Anther goal is to lose 5% of your body weight  · Tell friends, family members, and coworkers about your goals  and ask for their support  Ask a friend to lose weight with you, or join a weight-loss support group  · Identify foods or triggers that may cause you to overeat , and find ways to avoid them  Remove tempting high-calorie foods from your home and workplace  Place a bowl of fresh fruit on your kitchen counter  If stress causes you to eat, then find other ways to cope with stress  · Keep a diary to track what you eat and drink  Also write down how many minutes of physical activity you do each day  Weigh yourself once a week and record it in your diary  Eating changes: You will need to eat 500 to 1,000 fewer calories each day than you currently eat to lose 1 to 2 pounds a week  The following changes will help you cut calories:  · Eat smaller portions  Use small plates, no larger than 9 inches in diameter  Fill your plate half full of fruits and vegetables  Measure your food using measuring cups until you know what a serving size looks like  · Eat 3 meals and 1 or 2 snacks each day  Plan your meals in advance  [place] and eat at home most of the time  Eat slowly  Do not skip meals  Skipping meals can lead to overeating later in the day  This can make it harder for you to lose weight   Talk with a dietitian to help you make a meal plan and schedule that is right for you  · Eat fruits and vegetables at every meal   They are low in calories and high in fiber, which makes you feel full  Do not add butter, margarine, or cream sauce to vegetables  Use herbs to season steamed vegetables  · Eat less fat and fewer fried foods  Eat more baked or grilled chicken and fish  These protein sources are lower in calories and fat than red meat  Limit fast food  Dress your salads with olive oil and vinegar instead of bottled dressing  · Limit the amount of sugar you eat  Do not drink sugary beverages  Limit alcohol  Activity changes:  Physical activity is good for your body in many ways  It helps you burn calories and build strong muscles  It decreases stress and depression, and improves your mood  It can also help you sleep better  Talk to your healthcare provider before you begin an exercise program   · Exercise for at least 30 minutes 5 days a week  Start slowly  Set aside time each day for physical activity that you enjoy and that is convenient for you  It is best to do both weight training and an activity that increases your heart rate, such as walking, bicycling, or swimming  · Find ways to be more active  Do yard work and housecleaning  Walk up the stairs instead of using elevators  Spend your leisure time going to events that require walking, such as outdoor festivals or fairs  This extra physical activity can help you lose weight and keep it off  Follow up with your healthcare provider as directed: You may need to meet with a dietitian  Write down your questions so you remember to ask them during your visits  © Copyright [address] Information is for End User's use only and may not be sold, redistributed or otherwise used for commercial purposes   All illustrations and images included in CareNotes® are the copyrighted property of A D A M , Inc  or [facility] Clemencia Gautam   The above information is an  only  It is not intended as medical advice for individual conditions or treatments  Talk to your doctor, nurse or pharmacist before following any medical regimen to see if it is safe and effective for you

## 2021-06-22 NOTE — PROGRESS NOTES
Kenya Mckinley MD    NAME: Efrain Pride  AGE: 45 y o  SEX: female  : 1970     DATE: 2021     Assessment and Plan:     Problem List Items Addressed This Visit        Other    ADD (attention deficit disorder)     Controlled  Continue on current regimen  F/U in 1 year  Hereditary hemochromatosis ([facility] [address] )     Managed by hematology         Relevant Orders    Comprehensive metabolic panel      Other Visit Diagnoses     Annual physical exam    -  Primary    Snoring        Relevant Orders    Ambulatory referral to Sleep Medicine    Screening cholesterol level        Relevant Orders    Lipid panel    Screen for colon cancer        Relevant Orders    Ambulatory referral to Gastroenterology    Screening exam for skin cancer        Relevant Orders    Ambulatory referral to Dermatology    Immunity status testing        Relevant Orders    Varicella zoster antibody, IgG          Immunizations and preventive care screenings were discussed with patient today  Appropriate education was printed on patient's after visit summary  Counseling:  Alcohol/drug use: discussed moderation in alcohol intake, the recommendations for healthy alcohol use, and avoidance of illicit drug use  Dental Health: discussed importance of regular tooth brushing, flossing, and dental visits  Injury prevention: discussed safety/seat belts, safety helmets, smoke detectors, carbon dioxide detectors, and smoking near bedding or upholstery  Sexual health: discussed sexually transmitted diseases, partner selection, use of condoms, avoidance of unintended pregnancy, and contraceptive alternatives  · Exercise: the importance of regular exercise/physical activity was discussed  Recommend exercise 3-5 times per week for at least 30 minutes  BMI Counseling: Body mass index is 35 29 kg/m²   The BMI is above normal  Nutrition recommendations include decreasing portion sizes, encouraging healthy choices of fruits and vegetables, decreasing fast food intake, consuming healthier snacks, limiting drinks that contain sugar, moderation in carbohydrate intake and increasing intake of lean protein  Exercise recommendations include moderate physical activity 150 minutes/week  No pharmacotherapy was ordered  Return in 1 year (on 6/22/2022)  Chief Complaint:     Chief Complaint   Patient presents with    Annual Exam      History of Present Illness:     Adult Annual Physical   Patient here for a comprehensive physical exam  The patient reports   High sleep apnea score before surgery  Pt reports snoring  Denies apnea, daytime drowsiness, unrestful sleep  Dad has HUMPHREY  Family h/o cardiac problems  Due for 3 year colonoscopy  Thinks she is UTD with pap  Bunion surgery a few months ago  Still with significant amount of swelling in foot up to knee  No pain  Reports good concentration and focus with Adderall  Diet and Physical Activity  · Diet/Nutrition: poor diet  · Exercise: no formal exercise  Depression Screening  PHQ-9 Depression Screening    PHQ-9:   Frequency of the following problems over the past two weeks:      Little interest or pleasure in doing things: 0 - not at all  Feeling down, depressed, or hopeless: 0 - not at all  PHQ-2 Score: 0       General Health  · Sleep: sleeps well  · Hearing: normal - bilateral   · Vision: no vision problems, goes for regular eye exams and wears glasses  · Dental: regular dental visits  /GYN Health  · Patient is: postmenopausal       Review of Systems:     Review of Systems   Constitutional: Negative  HENT: Negative  Negative for congestion, dental problem, ear pain, hearing loss, postnasal drip, rhinorrhea, sinus pressure, sinus pain, sore throat, tinnitus and trouble swallowing  Eyes: Negative  Respiratory: Negative  Cardiovascular: Positive for leg swelling   Negative for chest pain and palpitations  Gastrointestinal: Negative  Endocrine: Negative  Genitourinary: Negative  Musculoskeletal: Negative  Skin: Negative  Allergic/Immunologic: Negative  Neurological: Negative  Hematological: Negative  Psychiatric/Behavioral: Negative  Past Medical History:     Past Medical History:   Diagnosis Date    Hemochromatosis     IBS (irritable bowel syndrome)       Past Surgical History:     Past Surgical History:   Procedure Laterality Date    COLONOSCOPY      resolved ;  repeat due in 10 years     HYSTEROSCOPY W/ ENDOMETRIAL ABLATION      NASAL SEPTUM SURGERY      SC CORRJ HALLUX VALGUS W/SESMDC W/1METAR MEDIAL CNF Left 3/19/2021    Procedure: BUNIONECTOMY LAPIPLASTY LEFT FOOT;  Surgeon: Shi Sellers DPM;  Location:  MAIN OR;  Service: Podiatry      Social History:     Social History     Socioeconomic History    Marital status: /Civil Union     Spouse name: None    Number of children: None    Years of education: None    Highest education level: None   Occupational History    None   Tobacco Use    Smoking status: Former Smoker     Packs/day: 0 20     Years: 5 00     Pack years: 1 00     Quit date:      Years since quittin 4    Smokeless tobacco: Never Used   Vaping Use    Vaping Use: Never used   Substance and Sexual Activity    Alcohol use: Yes    Drug use: No    Sexual activity: None   Other Topics Concern    None   Social History Narrative    None     Social Determinants of Health     Financial Resource Strain:     Difficulty of Paying Living Expenses:    Food Insecurity:     Worried About Running Out of Food in the Last Year:     [address] in the Last Year:    Transportation Needs:     Lack of Transportation (Medical):      Lack of Transportation (Non-Medical):    Physical Activity:     Days of Exercise per Week:     Minutes of Exercise per Session:    Stress:     Feeling of Stress :    Social Connections:     Frequency of Communication with Friends and Family:     Frequency of Social Gatherings with Friends and Family:     Attends Temple Services:     Active Member of Clubs or Organizations:     Attends Club or Organization Meetings:     Marital Status:    Intimate Partner Violence:     Fear of Current or Ex-Partner:     Emotionally Abused:     Physically Abused:     Sexually Abused:       Family History:     Family History   Problem Relation Age of Onset    Uterine cancer Mother 61    No Known Problems Father     No Known Problems Sister     No Known Problems Daughter     No Known Problems Maternal Grandmother     No Known Problems Maternal Grandfather     No Known Problems Paternal Grandmother     No Known Problems Paternal Grandfather     No Known Problems Daughter     No Known Problems Maternal Aunt     No Known Problems Paternal Aunt     No Known Problems Paternal Aunt       Current Medications:     Current Outpatient Medications   Medication Sig Dispense Refill    amphetamine-dextroamphetamine (ADDERALL XR) 30 MG 24 hr capsule Take 1 capsule (30 mg total) by mouth dailyMax Daily Amount: 30 mg 30 capsule 0    cetirizine (ZyrTEC) 10 mg tablet Take 10 mg by mouth daily      Multiple Vitamin (multivitamin) tablet Take 1 tablet by mouth daily      Probiotic Product (PROBIOTIC DAILY PO) Take by mouth      triamcinolone (KENALOG) 0 1 % cream Apply topically 2 (two) times a day 30 g 0    Cholecalciferol (VITAMIN D) 2000 units CAPS Take 1 capsule by mouth daily (Patient not taking: Reported on 6/22/2021)       No current facility-administered medications for this visit  Allergies:     No Known Allergies   Physical Exam:     /82 (Patient Position: Sitting, Cuff Size: Large)   Pulse 80   Temp 97 9 °F (36 6 °C) (Tympanic)   Ht 6' (1 829 m)   Wt 118 kg (260 lb 3 2 oz)   SpO2 97%   BMI 35 29 kg/m²     Physical Exam  Vitals reviewed  Constitutional:       Appearance: Normal appearance   She is obese    HENT:      Head: Normocephalic and atraumatic  Right Ear: Tympanic membrane, ear canal and external ear normal       Left Ear: Tympanic membrane, ear canal and external ear normal       Nose: Nose normal       Mouth/Throat:      Mouth: Mucous membranes are moist       Pharynx: Oropharynx is clear  Eyes:      Conjunctiva/sclera: Conjunctivae normal       Pupils: Pupils are equal, round, and reactive to light  Cardiovascular:      Rate and Rhythm: Normal rate and regular rhythm  Heart sounds: Normal heart sounds  No murmur heard  Pulmonary:      Effort: Pulmonary effort is normal       Breath sounds: Normal breath sounds  Abdominal:      General: Abdomen is flat  Bowel sounds are normal       Palpations: Abdomen is soft  There is no hepatomegaly or splenomegaly  Tenderness: There is no abdominal tenderness  Musculoskeletal:         General: Normal range of motion  Cervical back: Normal range of motion and neck supple  Skin:     General: Skin is warm and dry  Capillary Refill: Capillary refill takes less than 2 seconds  Neurological:      General: No focal deficit present  Mental Status: She is alert and oriented to person, place, and time  Psychiatric:         Mood and Affect: Mood normal          Behavior: Behavior normal          Thought Content:  Thought content normal          Judgment: Judgment normal           Kaveh Templeton MD

## 2021-06-23 ENCOUNTER — OFFICE VISIT (OUTPATIENT)
Dept: PODIATRY | Facility: CLINIC | Age: 51
End: 2021-06-23
Payer: COMMERCIAL

## 2021-06-23 VITALS
SYSTOLIC BLOOD PRESSURE: 127 MMHG | DIASTOLIC BLOOD PRESSURE: 82 MMHG | HEART RATE: 68 BPM | HEIGHT: 72 IN | BODY MASS INDEX: 35.21 KG/M2 | WEIGHT: 260 LBS

## 2021-06-23 DIAGNOSIS — I89.0 LYMPHEDEMA: ICD-10-CM

## 2021-06-23 DIAGNOSIS — M20.12 ACQUIRED HALLUX VALGUS, LEFT: Primary | ICD-10-CM

## 2021-06-23 DIAGNOSIS — I80.202 DEEP VEIN THROMBOPHLEBITIS OF LEFT LEG (HCC): ICD-10-CM

## 2021-06-23 PROCEDURE — 3008F BODY MASS INDEX DOCD: CPT | Performed by: PODIATRIST

## 2021-06-23 PROCEDURE — 1036F TOBACCO NON-USER: CPT | Performed by: PODIATRIST

## 2021-06-23 PROCEDURE — 99213 OFFICE O/P EST LOW 20 MIN: CPT | Performed by: PODIATRIST

## 2021-06-23 NOTE — PROGRESS NOTES
PATIENT:  Ingrid Woodward      1970    ASSESSMENT     1  Acquired hallux valgus, left     2  Lymphedema  Compression Stocking   3  Deep vein thrombophlebitis of left leg (HCC)  VAS lower limb venous duplex study, unilateral/limited          PLAN  LE edema looks more generalized than post-op  Will check venous doppler to rule out DVT  Also referred her for compression stockings  Instructed elevation and compression  RA in 6 weeks  HISTORY OF PRESENT ILLNESS  Patient presents for left foot evaluation  She feels well with no pain  She tolerates regular shoes  She noticed swelling in LLE without pain or redness  REVIEW OF SYSTEMS  GENERAL: No fever or chills  HEART: No chest pain, or palpitation  RESPIRATORY:  No SOB or cough  GI: No Nausea, vomit or diarrhea  NEUROLOGIC: No syncope or acute weakness  MUSCULOSKELETAL: No calf pain or edema  PHYSICAL EXAMINATION  GENERAL  The patient appears in NAD / non-toxic  Afebrile  VSS    VASCULAR EXAM  Pedal pulses and vascular status are intact  No calf pain bilaterally  No cyanosis  DERMATOLOGIC EXAM  No wound presents  No redness  No cellulitis  No rashes  NEUROLOGIC EXAM  AAO X 3  No focal neurologic deficit  Neurologic status is intact BLE  MUSCULOSKELETAL EXAM  Good surgical correction  Left 1st MPJ ROM improving well  No fluctuation or crepitus  Generalized pitting edema LLE  No pain, tightness or redness on calf  Aldo sign is negative

## 2021-06-29 LAB
ALBUMIN SERPL-MCNC: 4 G/DL (ref 3.6–5.1)
ALBUMIN/GLOB SERPL: 1.4 (CALC) (ref 1–2.5)
ALP SERPL-CCNC: 122 U/L (ref 37–153)
ALT SERPL-CCNC: 18 U/L (ref 6–29)
AST SERPL-CCNC: 21 U/L (ref 10–35)
BILIRUB SERPL-MCNC: 1.1 MG/DL (ref 0.2–1.2)
BUN SERPL-MCNC: 12 MG/DL (ref 7–25)
BUN/CREAT SERPL: ABNORMAL (CALC) (ref 6–22)
CALCIUM SERPL-MCNC: 9.1 MG/DL (ref 8.6–10.4)
CHLORIDE SERPL-SCNC: 105 MMOL/L (ref 98–110)
CHOLEST SERPL-MCNC: 141 MG/DL
CHOLEST/HDLC SERPL: 2.6 (CALC)
CO2 SERPL-SCNC: 29 MMOL/L (ref 20–32)
CREAT SERPL-MCNC: 0.61 MG/DL (ref 0.5–1.05)
GLOBULIN SER CALC-MCNC: 2.8 G/DL (CALC) (ref 1.9–3.7)
GLUCOSE SERPL-MCNC: 102 MG/DL (ref 65–99)
HDLC SERPL-MCNC: 54 MG/DL
LDLC SERPL CALC-MCNC: 71 MG/DL (CALC)
NONHDLC SERPL-MCNC: 87 MG/DL (CALC)
POTASSIUM SERPL-SCNC: 4.1 MMOL/L (ref 3.5–5.3)
PROT SERPL-MCNC: 6.8 G/DL (ref 6.1–8.1)
SL AMB EGFR AFRICAN AMERICAN: 121 ML/MIN/1.73M2
SL AMB EGFR NON AFRICAN AMERICAN: 104 ML/MIN/1.73M2
SODIUM SERPL-SCNC: 140 MMOL/L (ref 135–146)
TRIGL SERPL-MCNC: 76 MG/DL
VZV IGG SER IA-ACNC: 353.3 INDEX

## 2021-07-01 DIAGNOSIS — F98.8 ATTENTION DEFICIT DISORDER (ADD) WITHOUT HYPERACTIVITY: ICD-10-CM

## 2021-07-01 RX ORDER — DEXTROAMPHETAMINE SACCHARATE, AMPHETAMINE ASPARTATE MONOHYDRATE, DEXTROAMPHETAMINE SULFATE AND AMPHETAMINE SULFATE 7.5; 7.5; 7.5; 7.5 MG/1; MG/1; MG/1; MG/1
30 CAPSULE, EXTENDED RELEASE ORAL DAILY
Qty: 30 CAPSULE | Refills: 0 | Status: SHIPPED | OUTPATIENT
Start: 2021-07-01 | End: 2021-08-02 | Stop reason: SDUPTHER

## 2021-07-23 ENCOUNTER — LAB (OUTPATIENT)
Dept: LAB | Facility: HOSPITAL | Age: 51
End: 2021-07-23
Payer: COMMERCIAL

## 2021-07-23 DIAGNOSIS — E83.110 HEREDITARY HEMOCHROMATOSIS (HCC): ICD-10-CM

## 2021-07-23 DIAGNOSIS — Z00.00 ROUTINE GENERAL MEDICAL EXAMINATION AT A HEALTH CARE FACILITY: ICD-10-CM

## 2021-07-23 LAB
ALBUMIN SERPL BCP-MCNC: 3.4 G/DL (ref 3.5–5)
ALP SERPL-CCNC: 144 U/L (ref 46–116)
ALT SERPL W P-5'-P-CCNC: 26 U/L (ref 12–78)
ANION GAP SERPL CALCULATED.3IONS-SCNC: 3 MMOL/L (ref 4–13)
AST SERPL W P-5'-P-CCNC: 14 U/L (ref 5–45)
BASOPHILS # BLD AUTO: 0.01 THOUSANDS/ΜL (ref 0–0.1)
BASOPHILS NFR BLD AUTO: 0 % (ref 0–1)
BILIRUB SERPL-MCNC: 1.04 MG/DL (ref 0.2–1)
BUN SERPL-MCNC: 15 MG/DL (ref 5–25)
CALCIUM ALBUM COR SERPL-MCNC: 9.8 MG/DL (ref 8.3–10.1)
CALCIUM SERPL-MCNC: 9.3 MG/DL (ref 8.3–10.1)
CHLORIDE SERPL-SCNC: 105 MMOL/L (ref 100–108)
CO2 SERPL-SCNC: 28 MMOL/L (ref 21–32)
CREAT SERPL-MCNC: 0.66 MG/DL (ref 0.6–1.3)
EOSINOPHIL # BLD AUTO: 0.15 THOUSAND/ΜL (ref 0–0.61)
EOSINOPHIL NFR BLD AUTO: 3 % (ref 0–6)
ERYTHROCYTE [DISTWIDTH] IN BLOOD BY AUTOMATED COUNT: 12.6 % (ref 11.6–15.1)
FERRITIN SERPL-MCNC: 27 NG/ML (ref 8–388)
GFR SERPL CREATININE-BSD FRML MDRD: 102 ML/MIN/1.73SQ M
GLUCOSE P FAST SERPL-MCNC: 110 MG/DL (ref 65–99)
HCT VFR BLD AUTO: 49.4 % (ref 34.8–46.1)
HGB BLD-MCNC: 15.9 G/DL (ref 11.5–15.4)
IMM GRANULOCYTES # BLD AUTO: 0.02 THOUSAND/UL (ref 0–0.2)
IMM GRANULOCYTES NFR BLD AUTO: 0 % (ref 0–2)
IRON SATN MFR SERPL: 44 %
IRON SERPL-MCNC: 127 UG/DL (ref 50–170)
LYMPHOCYTES # BLD AUTO: 2.34 THOUSANDS/ΜL (ref 0.6–4.47)
LYMPHOCYTES NFR BLD AUTO: 38 % (ref 14–44)
MCH RBC QN AUTO: 30.8 PG (ref 26.8–34.3)
MCHC RBC AUTO-ENTMCNC: 32.2 G/DL (ref 31.4–37.4)
MCV RBC AUTO: 96 FL (ref 82–98)
MONOCYTES # BLD AUTO: 0.39 THOUSAND/ΜL (ref 0.17–1.22)
MONOCYTES NFR BLD AUTO: 6 % (ref 4–12)
NEUTROPHILS # BLD AUTO: 3.21 THOUSANDS/ΜL (ref 1.85–7.62)
NEUTS SEG NFR BLD AUTO: 53 % (ref 43–75)
NRBC BLD AUTO-RTO: 0 /100 WBCS
PLATELET # BLD AUTO: 241 THOUSANDS/UL (ref 149–390)
PMV BLD AUTO: 11 FL (ref 8.9–12.7)
POTASSIUM SERPL-SCNC: 4.1 MMOL/L (ref 3.5–5.3)
PROT SERPL-MCNC: 8.1 G/DL (ref 6.4–8.2)
RBC # BLD AUTO: 5.17 MILLION/UL (ref 3.81–5.12)
SODIUM SERPL-SCNC: 136 MMOL/L (ref 136–145)
TIBC SERPL-MCNC: 289 UG/DL (ref 250–450)
WBC # BLD AUTO: 6.12 THOUSAND/UL (ref 4.31–10.16)

## 2021-07-23 PROCEDURE — 83540 ASSAY OF IRON: CPT

## 2021-07-23 PROCEDURE — 36415 COLL VENOUS BLD VENIPUNCTURE: CPT

## 2021-07-23 PROCEDURE — 80053 COMPREHEN METABOLIC PANEL: CPT

## 2021-07-23 PROCEDURE — 83550 IRON BINDING TEST: CPT

## 2021-07-23 PROCEDURE — 82728 ASSAY OF FERRITIN: CPT

## 2021-07-23 PROCEDURE — 85025 COMPLETE CBC W/AUTO DIFF WBC: CPT

## 2021-07-26 ENCOUNTER — OFFICE VISIT (OUTPATIENT)
Dept: HEMATOLOGY ONCOLOGY | Facility: HOSPITAL | Age: 51
End: 2021-07-26
Payer: COMMERCIAL

## 2021-07-26 VITALS
WEIGHT: 261 LBS | RESPIRATION RATE: 16 BRPM | BODY MASS INDEX: 35.35 KG/M2 | DIASTOLIC BLOOD PRESSURE: 82 MMHG | HEIGHT: 72 IN | SYSTOLIC BLOOD PRESSURE: 134 MMHG | OXYGEN SATURATION: 98 % | HEART RATE: 95 BPM | TEMPERATURE: 98 F

## 2021-07-26 DIAGNOSIS — E83.110 HEREDITARY HEMOCHROMATOSIS (HCC): Primary | ICD-10-CM

## 2021-07-26 PROCEDURE — 99214 OFFICE O/P EST MOD 30 MIN: CPT | Performed by: INTERNAL MEDICINE

## 2021-07-26 NOTE — PROGRESS NOTES
Hematology/Oncology Outpatient Follow- up Note  Marychuy Carter 45 y o  female MRN: @ Encounter: [id number]        Date:  7/26/2021    Presenting Complaint/Diagnosis : History of hemochromatosis    HPI:      Tempie Oppenheim Fothergill is seen for initial consultation 5/9/2018 regarding Hemochromatosis  The patient rarely has a family history of hemochromatosis and was tested and proven to have it  She was on a phlebotomy schedule but then was slowly weaned down where she was only giving occasionally  She was seeing a hematologist every other year  She states Her children were tested and are also positive  Her sister and her brother both are also positive  She has not been getting phlebotomies for a while now  Her last hematologist moved so she decided to come to see us      Previous Hematologic/ Oncologic History:    Phlebotomy    Current Hematologic/ Oncologic Treatment:    Monthly phlebotomy    Interval History:     the patient returns for follow-up visit  She states she is doing well  Has not been  Regularly getting her phlebotomy secondary to logistical issues at [facility]  She states they keep rescheduling her for times where she is working  She is going to discuss this with them and get regular phlebotomy every month  Her ferritin is still under 50 which is good  Iron saturation has gone up slightly to 44%  Denies any nausea denies any vomiting denies any diarrhea  Is otherwise doing well  The rest of her 14 point review of systems today was negative  Test Results:    Imaging: No results found      Labs:   Lab Results   Component Value Date    WBC 6 12 07/23/2021    HGB 15 9 (H) 07/23/2021    HCT 49 4 (H) 07/23/2021    MCV 96 07/23/2021     07/23/2021     Lab Results   Component Value Date     06/04/2014    K 4 1 07/23/2021     07/23/2021    CO2 28 07/23/2021    ANIONGAP 1 (L) 06/04/2014    BUN 15 07/23/2021    CREATININE 0 66 07/23/2021    GLUCOSE 89 06/04/2014    GLUF 110 (H) 07/23/2021    CALCIUM 9 3 07/23/2021    CORRECTEDCA 9 8 07/23/2021    AST 14 07/23/2021    ALT 26 07/23/2021    ALKPHOS 144 (H) 07/23/2021    PROT 6 9 06/04/2014    BILITOT 1 0 06/04/2014    EGFR 102 07/23/2021       Lab Results   Component Value Date    IRON 127 07/23/2021    TIBC 289 07/23/2021    FERRITIN 27 07/23/2021         ROS: As stated in the history of present illness otherwise his 14 point review of systems today was negative  Active Problems:   Patient Active Problem List   Diagnosis    ADD (attention deficit disorder)    Allergic rhinitis    Hereditary hemochromatosis ([facility] [address] )       Past Medical History:   Past Medical History:   Diagnosis Date    Hemochromatosis     IBS (irritable bowel syndrome)        Surgical History:   Past Surgical History:   Procedure Laterality Date    COLONOSCOPY      resolved 02/13;  repeat due in 10 years     HYSTEROSCOPY W/ ENDOMETRIAL ABLATION      NASAL SEPTUM SURGERY      [address] U  89  W/SESMDC W/1METAR MEDIAL CNF Left 3/19/2021    Procedure: BUNIONECTOMY LAPIPLASTY LEFT FOOT;  Surgeon: Delphine Root DPM;  Location:  MAIN OR;  Service: Podiatry       Family History:    Family History   Problem Relation Age of Onset    Uterine cancer Mother 61    No Known Problems Father     No Known Problems Sister     No Known Problems Daughter     No Known Problems Maternal Grandmother     No Known Problems Maternal Grandfather     No Known Problems Paternal Grandmother     No Known Problems Paternal Grandfather     No Known Problems Daughter     No Known Problems Maternal Aunt     No Known Problems Paternal Aunt     No Known Problems Paternal Aunt        Cancer-related family history includes Uterine cancer (age of onset: 61) in her mother      Social History:   Social History     Socioeconomic History    Marital status: /Civil Union     Spouse name: Not on file    Number of children: Not on file    Years of education: Not on file   Saige Vega Highest education level: Not on file   Occupational History    Not on file   Tobacco Use    Smoking status: Former Smoker     Packs/day: 0 20     Years: 5 00     Pack years: 1 00     Quit date:      Years since quittin 5    Smokeless tobacco: Never Used   Vaping Use    Vaping Use: Never used   Substance and Sexual Activity    Alcohol use: Yes    Drug use: No    Sexual activity: Not on file   Other Topics Concern    Not on file   Social History Narrative    Not on file     Social Determinants of Health     Financial Resource Strain:     Difficulty of Paying Living Expenses:    Food Insecurity:     Worried About Running Out of Food in the Last Year:     [address] in the Last Year:    Transportation Needs:     Lack of Transportation (Medical):      Lack of Transportation (Non-Medical):    Physical Activity:     Days of Exercise per Week:     Minutes of Exercise per Session:    Stress:     Feeling of Stress :    Social Connections:     Frequency of Communication with Friends and Family:     Frequency of Social Gatherings with Friends and Family:     Attends [employer]oration[employer] Services:     Active Member of Clubs or Organizations:     Attends Club or Organization Meetings:     Marital Status:    Intimate Partner Violence:     Fear of Current or Ex-Partner:     Emotionally Abused:     Physically Abused:     Sexually Abused:        Current Medications:   Current Outpatient Medications   Medication Sig Dispense Refill    amphetamine-dextroamphetamine (ADDERALL XR) 30 MG 24 hr capsule Take 1 capsule (30 mg total) by mouth dailyMax Daily Amount: 30 mg 30 capsule 0    cetirizine (ZyrTEC) 10 mg tablet Take 10 mg by mouth daily      Cholecalciferol (VITAMIN D) 2000 units CAPS Take 1 capsule by mouth daily       Multiple Vitamin (multivitamin) tablet Take 1 tablet by mouth daily      Probiotic Product (PROBIOTIC DAILY PO) Take by mouth      triamcinolone (KENALOG) 0 1 % cream Apply topically 2 (two) times a day 30 g 0     No current facility-administered medications for this visit  Allergies: No Known Allergies    Physical Exam:    Body surface area is 2 38 meters squared  Wt Readings from Last 3 Encounters:   07/26/21 118 kg (261 lb)   06/23/21 118 kg (260 lb)   06/22/21 118 kg (260 lb 3 2 oz)        Temp Readings from Last 3 Encounters:   07/26/21 98 °F (36 7 °C) (Temporal)   06/22/21 97 9 °F (36 6 °C) (Tympanic)   03/19/21 97 7 °F (36 5 °C)        BP Readings from Last 3 Encounters:   07/26/21 134/82   06/23/21 127/82   06/22/21 122/82         Pulse Readings from Last 3 Encounters:   07/26/21 95   06/23/21 68   06/22/21 80         Physical Exam     Constitutional   General appearance: No acute distress, well appearing and well nourished  Eyes   Conjunctiva and lids: No swelling, erythema or discharge  Pupils and irises: Equal, round and reactive to light  Ears, Nose, Mouth, and Throat   External inspection of ears and nose: Normal     Nasal mucosa, septum, and turbinates: Normal without edema or erythema  Oropharynx: Normal with no erythema, edema, exudate or lesions  Pulmonary   Respiratory effort: No increased work of breathing or signs of respiratory distress  Auscultation of lungs: Clear to auscultation  Cardiovascular   Palpation of heart: Normal PMI, no thrills  Auscultation of heart: Normal rate and rhythm, normal S1 and S2, without murmurs  Examination of extremities for edema and/or varicosities: Normal     Carotid pulses: Normal     Abdomen   Abdomen: Non-tender, no masses  Liver and spleen: No hepatomegaly or splenomegaly  Lymphatic   Palpation of lymph nodes in neck: No lymphadenopathy  Musculoskeletal   Gait and station: Normal     Digits and nails: Normal without clubbing or cyanosis  Inspection/palpation of joints, bones, and muscles: Normal     Skin   Skin and subcutaneous tissue: Normal without rashes or lesions      Neurologic   Cranial nerves: Cranial nerves 2-12 intact  Sensation: No sensory loss  Psychiatric   Orientation to person, place, and time: Normal     Mood and affect: Normal         Assessment / Plan:      The patient is a 62-year-old female with hereditary hemochromatosis   She had a MRI completed on 05/17/2018 revealing liver iron concentration of 74 umol/g indicating slightly overload   Otherwise the MRI was normal   She will continue going to [facility] blood [facility] and having a unit of blood  mL of whole blood removed monthly  Her most recent hemoglobin was 15 9 with a normal white count of 6 12 and  A normal platelet count  Ferritin has come down to 27 with an iron saturation of 44% with a TIBC of 289 and an iron of 127  I advised her to continue her phlebotomy  She understands importance of doing so  We will continue her on her monthly phlebotomy  I did advise her to continue with the monthly donations and she was in agreement with the plan  We will see her back in 6 months with repeat blood work  Goals and Barriers:  Current Goal:  Prolong Survival from   Hemochromatosis  Barriers: None  Patient's Capacity to Self Care:  Patient  able to self care  Portions of the record may have been created with voice recognition software   Occasional wrong word or "sound a like" substitutions may have occurred due to the inherent limitations of voice recognition software   Read the chart carefully and recognize, using context, where substitutions have occurred

## 2021-08-02 DIAGNOSIS — F98.8 ATTENTION DEFICIT DISORDER (ADD) WITHOUT HYPERACTIVITY: ICD-10-CM

## 2021-08-02 RX ORDER — DEXTROAMPHETAMINE SACCHARATE, AMPHETAMINE ASPARTATE MONOHYDRATE, DEXTROAMPHETAMINE SULFATE AND AMPHETAMINE SULFATE 7.5; 7.5; 7.5; 7.5 MG/1; MG/1; MG/1; MG/1
30 CAPSULE, EXTENDED RELEASE ORAL DAILY
Qty: 30 CAPSULE | Refills: 0 | Status: SHIPPED | OUTPATIENT
Start: 2021-08-02 | End: 2021-09-02 | Stop reason: SDUPTHER

## 2021-08-03 ENCOUNTER — HOSPITAL ENCOUNTER (OUTPATIENT)
Dept: NON INVASIVE DIAGNOSTICS | Facility: HOSPITAL | Age: 51
Discharge: HOME | End: 2021-08-03
Attending: PODIATRIST
Payer: COMMERCIAL

## 2021-08-03 DIAGNOSIS — I80.202 DEEP VEIN THROMBOPHLEBITIS OF LEFT LEG (HCC): ICD-10-CM

## 2021-08-03 PROCEDURE — 93971 EXTREMITY STUDY: CPT

## 2021-08-03 PROCEDURE — 93971 EXTREMITY STUDY: CPT | Performed by: SURGERY

## 2021-08-04 ENCOUNTER — OFFICE VISIT (OUTPATIENT)
Dept: PODIATRY | Facility: CLINIC | Age: 51
End: 2021-08-04
Payer: COMMERCIAL

## 2021-08-04 VITALS
SYSTOLIC BLOOD PRESSURE: 116 MMHG | BODY MASS INDEX: 34.92 KG/M2 | WEIGHT: 257.8 LBS | DIASTOLIC BLOOD PRESSURE: 79 MMHG | HEIGHT: 72 IN | HEART RATE: 84 BPM

## 2021-08-04 DIAGNOSIS — M20.12 ACQUIRED HALLUX VALGUS, LEFT: Primary | ICD-10-CM

## 2021-08-04 DIAGNOSIS — I89.0 LYMPHEDEMA: ICD-10-CM

## 2021-08-04 PROCEDURE — 99213 OFFICE O/P EST LOW 20 MIN: CPT | Performed by: PODIATRIST

## 2021-08-04 PROCEDURE — 3008F BODY MASS INDEX DOCD: CPT | Performed by: PODIATRIST

## 2021-08-04 PROCEDURE — 1036F TOBACCO NON-USER: CPT | Performed by: PODIATRIST

## 2021-08-04 NOTE — PROGRESS NOTES
PATIENT:  Malissa Knee      1970    ASSESSMENT     1  Acquired hallux valgus, left     2  Lymphedema            PLAN  Reviewed and discussed venous doppler  LE edema is resolving well  Continue compression stockings  Instructed supportive care and she may return as needed  HISTORY OF PRESENT ILLNESS  Patient presents for left foot evaluation  She feels well with no pain  She tolerates regular shoes  Decreased swelling since the last visit  She has been using stockings, but did not present with them today  REVIEW OF SYSTEMS  GENERAL: No fever or chills  HEART: No chest pain, or palpitation  RESPIRATORY:  No SOB or cough  GI: No Nausea, vomit or diarrhea  NEUROLOGIC: No syncope or acute weakness  MUSCULOSKELETAL: No calf pain or edema  PHYSICAL EXAMINATION  GENERAL  The patient appears in NAD / non-toxic  Afebrile  VSS    VASCULAR EXAM  Pedal pulses and vascular status are intact  No calf pain  No cyanosis  DERMATOLOGIC EXAM  No wound presents  No redness  No cellulitis  No rashes  NEUROLOGIC EXAM  AAO X 3  No focal neurologic deficit  Neurologic status is intact BLE  MUSCULOSKELETAL EXAM  Good surgical correction  Left 1st MPJ ROM improving well  No fluctuation or crepitus  Significanr reduction of LLE edema  Aldo sign is negative

## 2021-09-02 DIAGNOSIS — F98.8 ATTENTION DEFICIT DISORDER (ADD) WITHOUT HYPERACTIVITY: ICD-10-CM

## 2021-09-02 RX ORDER — DEXTROAMPHETAMINE SACCHARATE, AMPHETAMINE ASPARTATE MONOHYDRATE, DEXTROAMPHETAMINE SULFATE AND AMPHETAMINE SULFATE 7.5; 7.5; 7.5; 7.5 MG/1; MG/1; MG/1; MG/1
30 CAPSULE, EXTENDED RELEASE ORAL DAILY
Qty: 30 CAPSULE | Refills: 0 | Status: SHIPPED | OUTPATIENT
Start: 2021-09-02 | End: 2021-10-04 | Stop reason: SDUPTHER

## 2021-09-14 ENCOUNTER — OFFICE VISIT (OUTPATIENT)
Dept: SLEEP CENTER | Facility: HOSPITAL | Age: 51
End: 2021-09-14
Payer: COMMERCIAL

## 2021-09-14 VITALS
WEIGHT: 260 LBS | SYSTOLIC BLOOD PRESSURE: 112 MMHG | BODY MASS INDEX: 35.21 KG/M2 | HEIGHT: 72 IN | DIASTOLIC BLOOD PRESSURE: 60 MMHG

## 2021-09-14 DIAGNOSIS — R51.9 HEADACHE UPON AWAKENING: ICD-10-CM

## 2021-09-14 DIAGNOSIS — E66.9 OBESITY (BMI 30-39.9): ICD-10-CM

## 2021-09-14 DIAGNOSIS — R53.83 FATIGUE, UNSPECIFIED TYPE: ICD-10-CM

## 2021-09-14 DIAGNOSIS — R06.83 SNORING: ICD-10-CM

## 2021-09-14 DIAGNOSIS — F98.8 ATTENTION DEFICIT DISORDER, UNSPECIFIED HYPERACTIVITY PRESENCE: ICD-10-CM

## 2021-09-14 DIAGNOSIS — G47.33 OSA (OBSTRUCTIVE SLEEP APNEA): Primary | ICD-10-CM

## 2021-09-14 DIAGNOSIS — F45.8 BRUXISM: ICD-10-CM

## 2021-09-14 PROCEDURE — 99204 OFFICE O/P NEW MOD 45 MIN: CPT | Performed by: INTERNAL MEDICINE

## 2021-09-14 PROCEDURE — 3008F BODY MASS INDEX DOCD: CPT | Performed by: INTERNAL MEDICINE

## 2021-09-14 PROCEDURE — 1036F TOBACCO NON-USER: CPT | Performed by: INTERNAL MEDICINE

## 2021-09-14 NOTE — PATIENT INSTRUCTIONS
What is HUMPHREY? Obstructive sleep apnea is a common and serious sleep disorder that causes you to stop breathing during sleep  The airway repeatedly becomes blocked, limiting the amount of air that reaches your lungs  When this happens, you may snore loudly or making choking noises as you try to breathe  Your brain and body becomes oxygen deprived and you may wake up  This may happen a few times a night, or in more severe cases, several hundred times a night  Sleep apnea can make you wake up in the morning feeling tired or unrefreshed even though you have had a full night of sleep  During the day, you may feel fatigued, have difficulty concentrating or you may even unintentionally fall asleep  This is because your body is waking up numerous times throughout the night, even though you might not be conscious of each awakening  The lack of oxygen your body receives can have negative long-term consequences for your health  This includes:  High blood pressure  Heart disease  Irregular heart rhythms  Stroke  Pre-diabetes and diabetes  Depression    Testing  An objective evaluation of your sleep may be needed before your board certified sleep physician can make a diagnosis  Options include:   In-lab overnight sleep study  This type of sleep study requires you to stay overnight at a sleep center, in a bed that may resemble a hotel room  You will sleep with sensors hooked up to various parts of your body  These sensors record your brain waves, heartbeat, breathing and movement  An overnight sleep study also provides your doctor with the most complete information about your sleep  Learn more about an overnight sleep study      Home sleep apnea test  Some patients with high risk factors for obstructive sleep apnea and no other medical disorders may be candidates for a home sleep apnea test  The testing equipment differs in that it is less complicated than what is used in an overnight sleep study   As such, does not give all the data an in-lab will and if negative, may not mean you do not have the problem  Treatment for sleep apnea  includes using a continuous positive airway pressure (CPAP) machine to keep your airway open during sleep  A mask is placed over your nose and mouth, or just your nose  The mask is hooked to the CPAP machine that blows a gentle stream of air into the mask when you breathe  This helps keep your airway open so you can breathe more regularly  Extra oxygen may be given to you through the machine  You may be given a mouth device  It looks like a mouth guard or dental retainer and stops your tongue and mouth tissues from blocking your throat while you sleep  Surgery may be needed to remove extra tissues that block your mouth, throat, or nose  Manage sleep apnea:   Do not smoke  Nicotine and other chemicals in cigarettes and cigars can cause lung damage  Ask your healthcare provider for information if you currently smoke and need help to quit  E-cigarettes or smokeless tobacco still contain nicotine  Talk to your healthcare provider before you use these products  Do not drink alcohol or take sedative medicine before you go to sleep  Alcohol and sedatives can relax the muscles and tissues around your throat  This can block the airflow to your lungs  Maintain a healthy weight  Excess tissue around your throat may restrict your breathing  Ask your healthcare provider for information if you need to lose weight  Sleep on your side or use pillows designed to prevent sleep apnea  This prevents your tongue or other tissues from blocking your throat  You can also raise the head of your bed  Driving Safety  Refrain from driving when drowsy  Follow up with your healthcare provider as directed:  Write down your questions so you remember to ask them during your visits  Go to [employer] website for more information: Sleepeducation  org     What is HUMPHREY?    Obstructive sleep apnea is a common and serious sleep disorder that causes you to stop breathing during sleep  The airway repeatedly becomes blocked, limiting the amount of air that reaches your lungs  When this happens, you may snore loudly or making choking noises as you try to breathe  Your brain and body becomes oxygen deprived and you may wake up  This may happen a few times a night, or in more severe cases, several hundred times a night  Sleep apnea can make you wake up in the morning feeling tired or unrefreshed even though you have had a full night of sleep  During the day, you may feel fatigued, have difficulty concentrating or you may even unintentionally fall asleep  This is because your body is waking up numerous times throughout the night, even though you might not be conscious of each awakening  The lack of oxygen your body receives can have negative long-term consequences for your health  This includes:  High blood pressure  Heart disease  Irregular heart rhythms  Stroke  Pre-diabetes and diabetes  Depression    Testing  An objective evaluation of your sleep may be needed before your board certified sleep physician can make a diagnosis  Options include:   In-lab overnight sleep study  This type of sleep study requires you to stay overnight at a sleep center, in a bed that may resemble a hotel room  You will sleep with sensors hooked up to various parts of your body  These sensors record your brain waves, heartbeat, breathing and movement  An overnight sleep study also provides your doctor with the most complete information about your sleep  Learn more about an overnight sleep study      Home sleep apnea test  Some patients with high risk factors for obstructive sleep apnea and no other medical disorders may be candidates for a home sleep apnea test  The testing equipment differs in that it is less complicated than what is used in an overnight sleep study   As such, does not give all the data an in-lab will and if negative, may not mean you do not have the problem  Treatment for sleep apnea  includes using a continuous positive airway pressure (CPAP) machine to keep your airway open during sleep  A mask is placed over your nose and mouth, or just your nose  The mask is hooked to the CPAP machine that blows a gentle stream of air into the mask when you breathe  This helps keep your airway open so you can breathe more regularly  Extra oxygen may be given to you through the machine  You may be given a mouth device  It looks like a mouth guard or dental retainer and stops your tongue and mouth tissues from blocking your throat while you sleep  Surgery may be needed to remove extra tissues that block your mouth, throat, or nose  Manage sleep apnea:   Do not smoke  Nicotine and other chemicals in cigarettes and cigars can cause lung damage  Ask your healthcare provider for information if you currently smoke and need help to quit  E-cigarettes or smokeless tobacco still contain nicotine  Talk to your healthcare provider before you use these products  Do not drink alcohol or take sedative medicine before you go to sleep  Alcohol and sedatives can relax the muscles and tissues around your throat  This can block the airflow to your lungs  Maintain a healthy weight  Excess tissue around your throat may restrict your breathing  Ask your healthcare provider for information if you need to lose weight  Sleep on your side or use pillows designed to prevent sleep apnea  This prevents your tongue or other tissues from blocking your throat  You can also raise the head of your bed  Driving Safety  Refrain from driving when drowsy  Follow up with your healthcare provider as directed:  Write down your questions so you remember to ask them during your visits  Go to AAS website for more information: Sleepeducation  org               Nursing Support:  When: Monday through Friday 7A-5PM except holidays  Where: Our direct line is [phone]      If you are having a true emergency please call 911  In the event that the line is busy or it is after hours please leave a voice message and we will return your call  Please speak clearly, leaving your full name, birth date, best number to reach you and the reason for your call  Medication refills: We will need the name of the medication, the dosage, the ordering provider, whether you get a 30 or 90 day refill, and the pharmacy name and address  Medications will be ordered by the provider only  Nurses cannot call in prescriptions  Please allow 7 days for medication refills  Physician requested updates: If your provider requested that you call with an update after starting medication, please be ready to provide us the medication and dosage, what time you take your medication, the time you attempt to fall asleep, time you fall asleep, when you wake up, and what time you get out of bed  Sleep Study Results: We will contact you with sleep study results and/or next steps after the physician has reviewed your testing

## 2021-09-14 NOTE — PROGRESS NOTES
Review of Systems      Genitourinary need to urinate more than twice a night and post menopausal (no peroids)   Cardiology ankle/leg swelling   Gastrointestinal none   Neurology awaken with headache   Constitutional fatigue   Integumentary none   Psychiatry none   Musculoskeletal none   Pulmonary snoring   ENT none   Endocrine frequent urination   Hematological blood donor

## 2021-09-14 NOTE — PROGRESS NOTES
Consultation - [address]  45 y o  female  :1970  RSN:[id number]  DOS:2021    Physician Requesting Consult: Orman Simmonds, [address]             Reason for Consult : At your kind request I saw Jian Dixon for initial sleep evaluation today  The patient is here to evaluate for suspected Obstructive Sleep Apnea  PFSH, Problem List, Medications & Allergies were reviewed in EMR  Jesus Chandra  has a past medical history of Hemochromatosis and IBS (irritable bowel syndrome)  She has a current medication list which includes the following prescription(s): amphetamine-dextroamphetamine, cetirizine, vitamin d, multivitamin, probiotic product, and triamcinolone  HPI:  He presents with a complaint of snoring of around 15 years duration that disturbs her  who feels snoring has escalated over time  She is not aware of snoring, breathing difficulties during sleep or modifying factors  Other Complaints: none  Restless Leg Syndrome: reports no suggestive symptoms    Parasomnia: reports teeth grinding during sleep, but no other features of parasomnia   Sleep Routine (on average):   Typical Bedtime:  10:00 p m  Gets OOB:  6:00 a m  TIB:8 hrs  Sleep latency:< 15 minutes Sleep Interruptions:2-3/nite because of nocturia and is able to fall back asleep unless my  snoring   Awakens: with aid of an alarm  Upon awakening: is not always refreshed  Awakens with headache around once a week  She estimates getting 6 5-7 hrs sleep  Jesus Chandra reports occasional   fatigue or Daytime Sleepiness but is not napping   She rated herself at Total score: 4 /24 on the [place] Sleepiness Scale  Habits:  reports that she quit smoking about 18 years ago  She has a 1 00 pack-year smoking history   She has never used smokeless tobacco  ;   E-Cigarette/Vaping    E-Cigarette Use Never User     ;  reports no history of drug use ;  reports current alcohol use  ; Caffeine use:limited ; Exercise routine: none because of recent foot surgery  Family History:  Father has obstructive sleep apnea  ROS - reviewed and as attached  Significant for approximately 35 lb weight gain in the past year  She had nasal septoplasty in the past and has environmental allergies for which she uses Zyrtec  She reported no respiratory or cardiac symptoms  She reports excessive fatigue when she misses a dose of Adderall  EXAM:  /60   Ht 6' (1 829 m)   Wt 118 kg (260 lb)   BMI 35 26 kg/m²    General: Well groomed female, well appearing, in no apparent distress  Neurological: Alert and orientated;  cooperative; Cranial nerves intact;    Psychiatric: Speech:clear and coherent;  Normal mood, affect & thought   Skin: warm and dry; Color& Hydration good; no facial rashes or lesions   HEENT:  Craniofacial anatomy: maxillary hypoplasia Sinuses: non- tender  TMJ: Normal    Eyes: EOM's intact;  conjunctiva/corneas clear   Ears: Externallyappear normal     Nasal Airway: is patent and assymetric nares Septum:  Deviated to the left; Mucosa: normal; Turbinates: normal; Rhinorrhea: None   Mouth: Lips: normal posture; Dentition: worn down  Mucosa:moist  ; Hard Palate:short    Oropharryx: crowded and AP narrowing Tongue: Mallampati:Class IV, Mobile and ScallopedSoft Palate:  redundant  Tonsils: absent  Neck: Neck Circumference: 15 "; Supple; no abnormal masses; Thyroid:normal  Trachea:central     Lymph: No Cervical or Submandibular Lymhadenopathy  Heart: S1,S2 normal; RRR; no gallop; no murmurs   Lungs: Respiratory Effort:normal  Air entry good bilaterally  No wheezes  No rales  Abdomen: Obese, Soft & non-tender    Extremities: No pedal edema  No clubbing or cyanosis  Musculoskeletal:  Motor normal; Gait:normal        IMPRESSION: Primary/Secondary Sleep Diagnoses (to Medical or Psych conditions) & Comorbidities   1  HUMPHREY (obstructive sleep apnea)  Diagnostic Sleep Study   2   Snoring  Ambulatory referral to Sleep Medicine   3  Headache upon awakening  Diagnostic Sleep Study   4  Bruxism     5  Fatigue, unspecified type     6  Attention deficit disorder, unspecified hyperactivity presence     7  Obesity (BMI 30-39  9)          PLAN:   With respect to above conditions, comprehensive counseling provided on pathophysiology; effects on symptoms and comorbidities, diagnostic strategies & limitations; treatment options; risks or no treament; risks & benefits of the various therapeutic options; costs and insurance aspects  I advised weight reduction, avoiding sleeping supine, using alcohol or sedating medications close to bed time and on safe driving practices  Nocturnal polysomnography is indicated and a diagnostic study will be scheduled  Home sleep testing is contraindicated because suspect upper airway resistance that would not be demonstrated by Home sleep testing  Patient is willing to try Positive airway pressure therapy and will be scheduled for a titration study if indicated  Follow-up to be scheduled after the studies to review results, further details of treatment options and to initiate/adjust therapy  Sincerely,     Authenticated electronically by Felipe Sun MD   on 51/08/09   Board Certified Specialist     Portions of the record may have been created with voice recognition software  Occasional wrong word or "sound a like" substitutions may have occurred due to the inherent limitations of voice recognition software  There may also be notations and random deletions of words or characters from malfunctioning software  Read the chart carefully and recognize, using context, where substitutions/deletions have occurred

## 2021-09-17 ENCOUNTER — TELEPHONE (OUTPATIENT)
Dept: SLEEP CENTER | Facility: CLINIC | Age: 51
End: 2021-09-17

## 2021-09-17 VITALS — BODY MASS INDEX: 35.21 KG/M2 | WEIGHT: 260 LBS | HEIGHT: 72 IN

## 2021-09-17 DIAGNOSIS — G47.33 OSA (OBSTRUCTIVE SLEEP APNEA): Primary | ICD-10-CM

## 2021-09-23 ENCOUNTER — ANESTHESIA EVENT (OUTPATIENT)
Dept: GASTROENTEROLOGY | Facility: AMBULATORY SURGERY CENTER | Age: 51
End: 2021-09-23

## 2021-09-24 ENCOUNTER — HOSPITAL ENCOUNTER (OUTPATIENT)
Dept: GASTROENTEROLOGY | Facility: AMBULATORY SURGERY CENTER | Age: 51
Discharge: HOME | End: 2021-09-24
Payer: COMMERCIAL

## 2021-09-24 ENCOUNTER — ANESTHESIA (OUTPATIENT)
Dept: GASTROENTEROLOGY | Facility: AMBULATORY SURGERY CENTER | Age: 51
End: 2021-09-24

## 2021-09-24 VITALS
HEART RATE: 79 BPM | SYSTOLIC BLOOD PRESSURE: 126 MMHG | TEMPERATURE: 97 F | RESPIRATION RATE: 18 BRPM | DIASTOLIC BLOOD PRESSURE: 83 MMHG | OXYGEN SATURATION: 100 %

## 2021-09-24 DIAGNOSIS — Z12.11 SCREENING FOR COLON CANCER: ICD-10-CM

## 2021-09-24 PROCEDURE — G0121 COLON CA SCRN NOT HI RSK IND: HCPCS | Performed by: INTERNAL MEDICINE

## 2021-09-24 RX ORDER — PROPOFOL 10 MG/ML
INJECTION, EMULSION INTRAVENOUS AS NEEDED
Status: DISCONTINUED | OUTPATIENT
Start: 2021-09-24 | End: 2021-09-24

## 2021-09-24 RX ORDER — LIDOCAINE HYDROCHLORIDE 10 MG/ML
INJECTION, SOLUTION EPIDURAL; INFILTRATION; INTRACAUDAL; PERINEURAL AS NEEDED
Status: DISCONTINUED | OUTPATIENT
Start: 2021-09-24 | End: 2021-09-24

## 2021-09-24 RX ORDER — SODIUM CHLORIDE, SODIUM LACTATE, POTASSIUM CHLORIDE, CALCIUM CHLORIDE 600; 310; 30; 20 MG/100ML; MG/100ML; MG/100ML; MG/100ML
125 INJECTION, SOLUTION INTRAVENOUS CONTINUOUS
Status: DISCONTINUED | OUTPATIENT
Start: 2021-09-24 | End: 2021-09-28 | Stop reason: HOSPADM

## 2021-09-24 RX ADMIN — PROPOFOL 50 MG: 10 INJECTION, EMULSION INTRAVENOUS at 07:51

## 2021-09-24 RX ADMIN — PROPOFOL 50 MG: 10 INJECTION, EMULSION INTRAVENOUS at 07:44

## 2021-09-24 RX ADMIN — PROPOFOL 50 MG: 10 INJECTION, EMULSION INTRAVENOUS at 07:40

## 2021-09-24 RX ADMIN — LIDOCAINE HYDROCHLORIDE 30 MG: 10 INJECTION, SOLUTION EPIDURAL; INFILTRATION; INTRACAUDAL; PERINEURAL at 07:33

## 2021-09-24 RX ADMIN — PROPOFOL 50 MG: 10 INJECTION, EMULSION INTRAVENOUS at 07:38

## 2021-09-24 RX ADMIN — PROPOFOL 100 MG: 10 INJECTION, EMULSION INTRAVENOUS at 07:33

## 2021-09-24 RX ADMIN — PROPOFOL 50 MG: 10 INJECTION, EMULSION INTRAVENOUS at 07:36

## 2021-09-24 RX ADMIN — SODIUM CHLORIDE, SODIUM LACTATE, POTASSIUM CHLORIDE, CALCIUM CHLORIDE 125 ML/HR: 600; 310; 30; 20 INJECTION, SOLUTION INTRAVENOUS at 07:15

## 2021-09-24 RX ADMIN — PROPOFOL 50 MG: 10 INJECTION, EMULSION INTRAVENOUS at 07:42

## 2021-09-24 RX ADMIN — PROPOFOL 50 MG: 10 INJECTION, EMULSION INTRAVENOUS at 07:47

## 2021-09-24 RX ADMIN — PROPOFOL 50 MG: 10 INJECTION, EMULSION INTRAVENOUS at 07:55

## 2021-09-24 NOTE — ANESTHESIA PREPROCEDURE EVALUATION
Procedure:  COLONOSCOPY    Relevant Problems   No relevant active problems      BMI 35  hemoachromatosis  IBS  Restless leg syndrome  Lymphedema of legs  ADD    Physical Exam    Airway    Mallampati score: II  TM Distance: >3 FB  Neck ROM: full     Dental   No notable dental hx     Cardiovascular      Pulmonary      Other Findings        Anesthesia Plan  ASA Score- 3     Anesthesia Type- IV sedation with anesthesia with ASA Monitors  Additional Monitors:   Airway Plan:           Plan Factors-    Chart reviewed  Patient summary reviewed  Patient is not a current smoker  Patient did not smoke on day of surgery  Induction- intravenous  Postoperative Plan-     Informed Consent- Anesthetic plan and risks discussed with patient  I personally reviewed this patient with the CRNA  Discussed and agreed on the Anesthesia Plan with the CRNA  Sherre Soulier

## 2021-09-24 NOTE — DISCHARGE INSTRUCTIONS
Colonoscopy   WHAT YOU NEED TO KNOW:   A colonoscopy is a procedure to examine the inside of your colon (intestine) with a scope  Polyps or tissue growths may have been removed during your colonoscopy  It is normal to feel bloated and to have some abdominal discomfort  You should be passing gas  If you have hemorrhoids or you had polyps removed, you may have a small amount of bleeding  DISCHARGE INSTRUCTIONS:   Seek care immediately if:    You have sudden, severe abdominal pain   You have problems swallowing   You have a large amount of black, sticky bowel movements or blood in your bowel movements   You have sudden trouble breathing   You feel weak, lightheaded, or faint or your heart beats faster than normal for you  Contact your healthcare provider if:    You have a fever and chills   You have nausea or are vomiting   Your abdomen is bloated or feels full and hard   You have abdominal pain   You have black, sticky bowel movements or blood in your bowel movements   You have not had a bowel movement for 3 days after your procedure   You have rash or hives   You have questions or concerns about your procedure  Activity:    Do not lift, strain, or run for 24 hours after your procedure   Rest after your procedure  You have been given medicine to relax you  Do not drive or make important decisions until the day after your procedure  Return to your normal activity as directed   Relieve gas and discomfort from bloating by lying on your right side with a heating pad on your abdomen  You may need to take short walks to help the gas move out  Eat small meals until bloating is relieved  Follow up with your healthcare provider as directed: Write down your questions so you remember to ask them during your visits  If you take a blood thinner, please review the specific instructions from your endoscopist about when you should resume it   These can be found in the Recommendation and Your Medication list sections of this After Visit Summary  Hemorrhoids   WHAT YOU NEED TO KNOW:   What are hemorrhoids? Hemorrhoids are swollen blood vessels inside your rectum (internal hemorrhoids) or on your anus (external hemorrhoids)  Sometimes a hemorrhoid may prolapse  This means it extends out of your anus  What increases my risk for hemorrhoids? · Pregnancy or obesity    · Straining or sitting for a long time during bowel movements    · Liver disease    · Weak muscles around the anus caused by older age, rectal surgery, or anal intercourse    · A lack of physical activity    · Chronic diarrhea or constipation    · A low-fiber diet    What are the signs and symptoms of hemorrhoids? · Pain or itching around your anus or inside your rectum    · Swelling or bumps around your anus    · Bright red blood in your bowel movement, on the toilet paper, or in the toilet bowl    · Tissue bulging out of your anus (prolapsed hemorrhoids)    · Incontinence (poor control over urine or bowel movements)    How are hemorrhoids diagnosed? Your healthcare provider will ask about your symptoms, the foods you eat, and your bowel movements  He or she will examine your anus for external hemorrhoids  You may need the following:  · A digital rectal exam  is a test to check for hemorrhoids  Your healthcare provider will put a gloved finger inside your anus to feel for the hemorrhoids  · An anoscopy  is a test that uses a scope (small tube with a light and camera on the end) to look at your hemorrhoids  How are hemorrhoids treated? Treatment will depend on your symptoms  You may need any of the following:  · Medicines  can help decrease pain and swelling, and soften your bowel movement  The medicine may be a pill, pad, cream, or ointment  · Procedures  may be used to shrink or remove your hemorrhoid  Examples include rubber-band ligation, sclerotherapy, and photocoagulation  These procedures may be done in your healthcare provider's office  Ask your healthcare provider for more information about these procedures  · Surgery  may be needed to shrink or remove your hemorrhoids  How can I manage my symptoms? · Apply ice on your anus for 15 to 20 minutes every hour or as directed  Use an ice pack, or put crushed ice in a plastic bag  Cover it with a towel before you apply it to your anus  Ice helps prevent tissue damage and decreases swelling and pain  · Take a sitz bath  Fill a bathtub with 4 to 6 inches of warm water  You may also use a sitz bath pan that fits inside a toilet bowl  Sit in the sitz bath for 15 minutes  Do this 3 times a day, and after each bowel movement  The warm water can help decrease pain and swelling  · Keep your anal area clean  Gently wash the area with warm water daily  Soap may irritate the area  After a bowel movement, wipe with moist towelettes or wet toilet paper  Dry toilet paper can irritate the area  How can I help prevent hemorrhoids? · Do not strain to have a bowel movement  Do not sit on the toilet too long  These actions can increase pressure on the tissues in your rectum and anus  · Drink plenty of liquids  Liquids can help prevent constipation  Ask how much liquid to drink each day and which liquids are best for you  · Eat a variety of high-fiber foods  Examples include fruits, vegetables, and whole grains  Ask your healthcare provider how much fiber you need each day  You may need to take a fiber supplement  · Exercise as directed  Exercise, such as walking, may make it easier to have a bowel movement  Ask your healthcare provider to help you create an exercise plan  · Do not have anal sex  Anal sex can weaken the skin around your rectum and anus  · Avoid heavy lifting  This can cause straining and increase your risk for another hemorrhoid  When should I seek immediate care?    · You have severe pain in your rectum or around your anus  · You have severe pain in your abdomen and you are vomiting  · You have bleeding from your anus that soaks through your underwear  When should I contact my healthcare provider? · You have frequent and painful bowel movements  · Your hemorrhoid looks or feels more swollen than usual      · You do not have a bowel movement for 2 days or more  · You see or feel tissue coming through your anus  · You have questions or concerns about your condition or care  CARE AGREEMENT:   You have the right to help plan your care  Learn about your health condition and how it may be treated  Discuss treatment options with your healthcare providers to decide what care you want to receive  You always have the right to refuse treatment  The above information is an  only  It is not intended as medical advice for individual conditions or treatments  Talk to your doctor, nurse or pharmacist before following any medical regimen to see if it is safe and effective for you  © Copyright [employer] 2021 Information is for End User's use only and may not be sold, redistributed or otherwise used for commercial purposes   All illustrations and images included in CareNotes® are the copyrighted property of A D A M , Inc  or [address] [place] [address] [employer]

## 2021-09-24 NOTE — H&P
History and Physical - [facility]  Zaid Reed 45 y o  female MRN: [id number]      HPI: Zaid Reed is a 45y o  year old female who presents for colon cancer screening  REVIEW OF SYSTEMS: Per the HPI, and otherwise unremarkable      Historical Information   Past Medical History:   Diagnosis Date    Class 2 severe obesity due to excess calories with serious comorbidity and body mass index (BMI) of 35 0 to 35 9 in adult [facility])     Hemochromatosis     Hereditary hemochromatosis ([facility] [address] ) 2018    IBS (irritable bowel syndrome)      Past Surgical History:   Procedure Laterality Date    COLONOSCOPY      poor prep colonoscopy 2016    HYSTEROSCOPY W/ ENDOMETRIAL ABLATION      NASAL SEPTUM SURGERY      SD CORRJ HALLUX VALGUS W/SESMDC W/1METAR MEDIAL CNF Left 2021    Procedure: BUNIONECTOMY LAPIPLASTY LEFT FOOT;  Surgeon: Tatiana Waite DPM;  Location:  MAIN OR;  Service: Podiatry     Social History   Social History     Substance and Sexual Activity   Alcohol Use Yes     Social History     Substance and Sexual Activity   Drug Use No     Social History     Tobacco Use   Smoking Status Former Smoker    Packs/day: 0 20    Years: 5 00    Pack years: 1 00    Quit date:     Years since quittin 7   Smokeless Tobacco Never Used     Family History   Problem Relation Age of Onset    Uterine cancer Mother 61    No Known Problems Father     No Known Problems Sister     No Known Problems Daughter     No Known Problems Maternal Grandmother     No Known Problems Maternal Grandfather     No Known Problems Paternal Grandmother     No Known Problems Paternal Grandfather     No Known Problems Daughter     No Known Problems Maternal Aunt     No Known Problems Paternal Aunt     No Known Problems Paternal Aunt     Colon polyps Neg Hx     Colon cancer Neg Hx        Meds/Allergies       Current Outpatient Medications:     amphetamine-dextroamphetamine (ADDERALL XR) 30 MG 24 hr capsule    cetirizine (ZyrTEC) 10 mg tablet    Cholecalciferol (VITAMIN D) 2000 units CAPS    Multiple Vitamin (multivitamin) tablet    Probiotic Product (PROBIOTIC DAILY PO)    triamcinolone (KENALOG) 0 1 % cream    Current Facility-Administered Medications:     lactated ringers infusion, 125 mL/hr, Intravenous, Continuous, Restarted at 09/24/21 0720    No Known Allergies    Objective     /77   Pulse 75   Temp (!) 97 °F (36 1 °C) (Temporal)   Resp 16   LMP 01/01/2019 (Approximate)   SpO2 99%       PHYSICAL EXAM    Gen: NAD AAOx3  Head: Normocephalic, Atraumatic  CV: S1S2 RRR no m/r/g  CHEST: Clear b/l no c/r/w  ABD: soft, +BS NT/ND no masses  EXT: no edema      ASSESSMENT/PLAN:  This is a 45y o  year old female here for colonoscopy, and she is stable and optimized for her procedure

## 2021-09-24 NOTE — ANESTHESIA POSTPROCEDURE EVALUATION
Post-Op Assessment Note    CV Status:  Stable    Pain management: adequate     Mental Status:  Sleepy   Hydration Status:  Euvolemic   PONV Controlled:  Controlled   Airway Patency:  Patent      Post Op Vitals Reviewed: Yes      Staff: Anesthesiologist, CRNA         No complications documented      /64 (09/24/21 0803)    Temp     Pulse 82 (09/24/21 0803)   Resp 16 (09/24/21 0803)    SpO2 98 % (09/24/21 0803)

## 2021-10-04 DIAGNOSIS — F98.8 ATTENTION DEFICIT DISORDER (ADD) WITHOUT HYPERACTIVITY: ICD-10-CM

## 2021-10-04 RX ORDER — DEXTROAMPHETAMINE SACCHARATE, AMPHETAMINE ASPARTATE MONOHYDRATE, DEXTROAMPHETAMINE SULFATE AND AMPHETAMINE SULFATE 7.5; 7.5; 7.5; 7.5 MG/1; MG/1; MG/1; MG/1
30 CAPSULE, EXTENDED RELEASE ORAL DAILY
Qty: 30 CAPSULE | Refills: 0 | Status: SHIPPED | OUTPATIENT
Start: 2021-10-04 | End: 2021-11-03 | Stop reason: SDUPTHER

## 2021-10-08 ENCOUNTER — HOSPITAL ENCOUNTER (OUTPATIENT)
Dept: SLEEP CENTER | Facility: HOSPITAL | Age: 51
Discharge: HOME | End: 2021-10-08
Attending: INTERNAL MEDICINE
Payer: COMMERCIAL

## 2021-10-08 DIAGNOSIS — G47.33 OSA (OBSTRUCTIVE SLEEP APNEA): ICD-10-CM

## 2021-10-08 PROCEDURE — G0399 HOME SLEEP TEST/TYPE 3 PORTA: HCPCS

## 2021-10-15 ENCOUNTER — TELEPHONE (OUTPATIENT)
Dept: SLEEP CENTER | Facility: CLINIC | Age: 51
End: 2021-10-15

## 2021-11-03 DIAGNOSIS — F98.8 ATTENTION DEFICIT DISORDER (ADD) WITHOUT HYPERACTIVITY: ICD-10-CM

## 2021-11-03 RX ORDER — DEXTROAMPHETAMINE SACCHARATE, AMPHETAMINE ASPARTATE MONOHYDRATE, DEXTROAMPHETAMINE SULFATE AND AMPHETAMINE SULFATE 7.5; 7.5; 7.5; 7.5 MG/1; MG/1; MG/1; MG/1
30 CAPSULE, EXTENDED RELEASE ORAL DAILY
Qty: 30 CAPSULE | Refills: 0 | Status: SHIPPED | OUTPATIENT
Start: 2021-11-03 | End: 2021-12-02 | Stop reason: SDUPTHER

## 2021-11-11 ENCOUNTER — IMMUNIZATIONS (OUTPATIENT)
Dept: FAMILY MEDICINE CLINIC | Facility: HOSPITAL | Age: 51
End: 2021-11-11

## 2021-11-11 DIAGNOSIS — Z23 ENCOUNTER FOR IMMUNIZATION: Primary | ICD-10-CM

## 2021-11-11 PROCEDURE — 91300 COVID-19 PFIZER VACC 0.3 ML: CPT

## 2021-11-11 PROCEDURE — 0001A COVID-19 PFIZER VACC 0.3 ML: CPT

## 2021-12-02 DIAGNOSIS — L30.8 OTHER ECZEMA: ICD-10-CM

## 2021-12-02 DIAGNOSIS — F98.8 ATTENTION DEFICIT DISORDER (ADD) WITHOUT HYPERACTIVITY: ICD-10-CM

## 2021-12-02 RX ORDER — TRIAMCINOLONE ACETONIDE 1 MG/G
CREAM TOPICAL 2 TIMES DAILY
Qty: 30 G | Refills: 0 | Status: SHIPPED | OUTPATIENT
Start: 2021-12-02

## 2021-12-02 RX ORDER — DEXTROAMPHETAMINE SACCHARATE, AMPHETAMINE ASPARTATE MONOHYDRATE, DEXTROAMPHETAMINE SULFATE AND AMPHETAMINE SULFATE 7.5; 7.5; 7.5; 7.5 MG/1; MG/1; MG/1; MG/1
30 CAPSULE, EXTENDED RELEASE ORAL DAILY
Qty: 30 CAPSULE | Refills: 0 | Status: SHIPPED | OUTPATIENT
Start: 2021-12-02 | End: 2021-12-31 | Stop reason: SDUPTHER

## 2021-12-07 ENCOUNTER — OFFICE VISIT (OUTPATIENT)
Dept: URGENT CARE | Facility: CLINIC | Age: 51
End: 2021-12-07
Payer: COMMERCIAL

## 2021-12-07 VITALS
WEIGHT: 265 LBS | HEIGHT: 72 IN | OXYGEN SATURATION: 99 % | HEART RATE: 73 BPM | RESPIRATION RATE: 16 BRPM | TEMPERATURE: 97.2 F | BODY MASS INDEX: 35.89 KG/M2

## 2021-12-07 DIAGNOSIS — B34.9: Primary | ICD-10-CM

## 2021-12-07 LAB
FLUAV RNA RESP QL NAA+PROBE: NEGATIVE
FLUBV RNA RESP QL NAA+PROBE: NEGATIVE
RSV RNA RESP QL NAA+PROBE: NEGATIVE
SARS-COV-2 RNA RESP QL NAA+PROBE: NEGATIVE

## 2021-12-07 PROCEDURE — 0241U HB NFCT DS VIR RESP RNA 4 TRGT: CPT | Performed by: FAMILY MEDICINE

## 2021-12-07 PROCEDURE — G0382 LEV 3 HOSP TYPE B ED VISIT: HCPCS | Performed by: FAMILY MEDICINE

## 2021-12-31 DIAGNOSIS — F98.8 ATTENTION DEFICIT DISORDER (ADD) WITHOUT HYPERACTIVITY: ICD-10-CM

## 2022-01-02 RX ORDER — DEXTROAMPHETAMINE SACCHARATE, AMPHETAMINE ASPARTATE MONOHYDRATE, DEXTROAMPHETAMINE SULFATE AND AMPHETAMINE SULFATE 7.5; 7.5; 7.5; 7.5 MG/1; MG/1; MG/1; MG/1
30 CAPSULE, EXTENDED RELEASE ORAL DAILY
Qty: 30 CAPSULE | Refills: 0 | Status: SHIPPED | OUTPATIENT
Start: 2022-01-02 | End: 2022-02-01 | Stop reason: SDUPTHER

## 2022-01-18 ENCOUNTER — OFFICE VISIT (OUTPATIENT)
Dept: SLEEP CENTER | Facility: HOSPITAL | Age: 52
End: 2022-01-18
Payer: COMMERCIAL

## 2022-01-18 VITALS
WEIGHT: 265 LBS | DIASTOLIC BLOOD PRESSURE: 90 MMHG | SYSTOLIC BLOOD PRESSURE: 116 MMHG | HEIGHT: 72 IN | BODY MASS INDEX: 35.89 KG/M2

## 2022-01-18 DIAGNOSIS — E66.9 OBESITY (BMI 30-39.9): ICD-10-CM

## 2022-01-18 DIAGNOSIS — G47.33 OSA (OBSTRUCTIVE SLEEP APNEA): Primary | ICD-10-CM

## 2022-01-18 DIAGNOSIS — R51.9 HEADACHE UPON AWAKENING: ICD-10-CM

## 2022-01-18 DIAGNOSIS — R53.83 FATIGUE, UNSPECIFIED TYPE: ICD-10-CM

## 2022-01-18 DIAGNOSIS — F45.8 BRUXISM: ICD-10-CM

## 2022-01-18 DIAGNOSIS — F98.8 ATTENTION DEFICIT DISORDER, UNSPECIFIED HYPERACTIVITY PRESENCE: ICD-10-CM

## 2022-01-18 PROCEDURE — 99214 OFFICE O/P EST MOD 30 MIN: CPT | Performed by: INTERNAL MEDICINE

## 2022-01-18 PROCEDURE — 3008F BODY MASS INDEX DOCD: CPT | Performed by: INTERNAL MEDICINE

## 2022-01-18 PROCEDURE — 1036F TOBACCO NON-USER: CPT | Performed by: INTERNAL MEDICINE

## 2022-01-18 NOTE — PATIENT INSTRUCTIONS

## 2022-01-18 NOTE — PROGRESS NOTES
Review of Systems      Genitourinary need to urinate more than twice a night and post menopausal (no peroids)   Cardiology ankle/leg swelling   Gastrointestinal frequent heartburn/acid reflux   Neurology awaken with headache   Constitutional none   Integumentary rash or dry skin   Psychiatry none   Musculoskeletal none   Pulmonary snoring   ENT none   Endocrine frequent urination   Hematological blood donor

## 2022-01-18 NOTE — PROGRESS NOTES
Follow-Up Note - Sleep Center   Krystle Cox  45 y o  female  :1970  FZZ:[id number]  DOS:2022    CC: I saw this patient for follow-up in clinic today for Sleep disordered breathing, Coexisting Sleep and Medical Problems  Home sleep testing was undertaken to evaluate for sleep disordered breathing and patient is here to review results and further options  The study demonstrated : XIAO (respiratory event index of) 21 /hour  Minimum oxygen saturation 83%  and 0 6% of total sleep time was spent with saturations less than 90%  The snore index was 44 7%  PFSH, Problem List, Medications & Allergies were reviewed in EMR  Interval changes: none reported  She  has a past medical history of Class 2 severe obesity due to excess calories with serious comorbidity and body mass index (BMI) of 35 0 to 35 9 in [facility]), Hemochromatosis, Hereditary hemochromatosis ([facility] [address] ) (2018), and IBS (irritable bowel syndrome)  She has a current medication list which includes the following prescription(s): amphetamine-dextroamphetamine, cetirizine, vitamin d, multivitamin, probiotic product, and triamcinolone  PHYSIOLOGICAL DATA REVIEW AND INTERPRETATION:   using PAP > 4 hours/night 13%  Estimated XIAO 1/hour with pressure of 11 7cm H2O @90th percentile; he was unable to use CPAP for a while because of a upper respiratory infection  She recommenced use around a week ago  Patient has not been using ozone based devices to sanitize the machine  SUBJECTIVE: Regarding use of PAP, Lavernecarmelita Linette reports:   · She is experiencing some adverse effects: mask leaks ; has not noticed any fibres or foreign material in air line  · She is benefiting from use: sleeping better and improved headaches    · She clenches during sleep  Sleep Routine: Natyderrick Linette reports getting 6-7 hrs sleep  ; she has no difficulty initiating, but reports diffculty maintaining sleep  related to Mask leaks   She arises with aid of an alarm and is not always refreshed  Genevieve Minor denies Excessive Daytime Sleepiness, she is on Adderall for ADHD She rated herself at Total score: 4 /24 on the [place] Sleepiness Scale  Habits: reports that she quit smoking about 19 years ago  She has a 1 00 pack-year smoking history  She has never used smokeless tobacco ,  reports current alcohol use ,  reports no history of drug use , Caffeine use: limited , Exercise routine: none   ROS: reviewed & as attached  Significant for few lb weight gain since her last visit  Allergies are controlled  [facility] EXAM: /90   Ht 6' (1 829 m)   Wt 120 kg (265 lb)   BMI 35 94 kg/m²     Patient is well groomed; well appearing  CNS: Alert, orientated, clear & coherent speech  Psych: cooperativeand in no distress  Mental state:appears normal   H&N: EOMI; NC/AT:no facial pressure marks, no rashes  Skin/Extrem: col & hydration normal; no edema  Resp: Respiratory effort is normal  Physical findings otherwise essentially unchanged from previous  IMPRESSION: Problem List Items & Comorbidities Addressed this Visit    1  HUMPHREY (obstructive sleep apnea)  PAP DME Resupply/Reorder   2  Headache upon awakening     3  Bruxism     4  Fatigue, unspecified type     5  Attention deficit disorder, unspecified hyperactivity presence     6  Obesity (BMI 30-39 9)     1-4 improved    PLAN:  1  I reviewed results of prior studies and physiologic data with the patient  2  I discussed treatment options with risks and benefits  3  Treatment with  PAP is medically necessary and Genevieve Minor is agreable to continue use  4  Care of equipment, methods to improve comfort using PAP and importance of compliance with therapy were discussed  5  Pressure setting: continue 5-15 cmH2O     6  Rx provided to replace  supplies and Care coordinated with DME provider  7  Discussed strategies for weight reduction      8  Follow-up is advised in 1 year or sooner if needed to monitor progress, compliance and to adjust therapy  Thank you for allowing me to participate in the care of this patient  Sincerely,    Authenticated electronically by Jeana Davidson MD on 47/56/13   Board Certified Specialist     Portions of the record may have been created with voice recognition software  Occasional wrong word or "sound a like" substitutions may have occurred due to the inherent limitations of voice recognition software  There may also be notations and random deletions of words or characters from malfunctioning software  Read the chart carefully and recognize, using context, where substitutions/deletions have occurred

## 2022-01-19 ENCOUNTER — TELEPHONE (OUTPATIENT)
Dept: SLEEP CENTER | Facility: CLINIC | Age: 52
End: 2022-01-19

## 2022-01-20 ENCOUNTER — TELEPHONE (OUTPATIENT)
Dept: HEMATOLOGY ONCOLOGY | Facility: CLINIC | Age: 52
End: 2022-01-20

## 2022-01-20 ENCOUNTER — TELEPHONE (OUTPATIENT)
Dept: HEMATOLOGY ONCOLOGY | Facility: HOSPITAL | Age: 52
End: 2022-01-20

## 2022-01-20 NOTE — TELEPHONE ENCOUNTER
01/20/22    LVM reminding pt for updated labs prior to the next appt  LABS:   CBC and differential   Comprehensive metabolic panel   Ferritin   Iron Saturation %   Iron   Methylmalonic acid, serum   TIBC    Advising pt to get blood work done in any [facility]  [facility] number also provided  ----------------------------------    Or pt will need to be R/S if she cannot complete her labs by end of Sat

## 2022-01-20 NOTE — TELEPHONE ENCOUNTER
Called patient LVM regarding needed to change her appt with Dr Araceli Daly on 1/24/2022 to [facility] same day but different time and provider due to Dr Araceli Daly will not be in  LVM to call [facility] # in case patient needed to be rescheduled

## 2022-01-21 NOTE — TELEPHONE ENCOUNTER
01/21/22    2nd attempt LMOM to [place] [facility] reminding labs  Will cancel her appt if don't rcv any response

## 2022-01-24 NOTE — TELEPHONE ENCOUNTER
01/24/22    ISAI informing I will cancel her appt today due to incompleted labs      [facility] number provided for R/S

## 2022-02-01 DIAGNOSIS — F98.8 ATTENTION DEFICIT DISORDER (ADD) WITHOUT HYPERACTIVITY: ICD-10-CM

## 2022-02-01 RX ORDER — DEXTROAMPHETAMINE SACCHARATE, AMPHETAMINE ASPARTATE MONOHYDRATE, DEXTROAMPHETAMINE SULFATE AND AMPHETAMINE SULFATE 7.5; 7.5; 7.5; 7.5 MG/1; MG/1; MG/1; MG/1
30 CAPSULE, EXTENDED RELEASE ORAL DAILY
Qty: 30 CAPSULE | Refills: 0 | Status: SHIPPED | OUTPATIENT
Start: 2022-02-01 | End: 2022-02-04 | Stop reason: SDUPTHER

## 2022-02-04 ENCOUNTER — TELEPHONE (OUTPATIENT)
Dept: FAMILY MEDICINE CLINIC | Facility: HOSPITAL | Age: 52
End: 2022-02-04

## 2022-02-04 DIAGNOSIS — F98.8 ATTENTION DEFICIT DISORDER (ADD) WITHOUT HYPERACTIVITY: ICD-10-CM

## 2022-02-04 RX ORDER — DEXTROAMPHETAMINE SACCHARATE, AMPHETAMINE ASPARTATE MONOHYDRATE, DEXTROAMPHETAMINE SULFATE AND AMPHETAMINE SULFATE 7.5; 7.5; 7.5; 7.5 MG/1; MG/1; MG/1; MG/1
30 CAPSULE, EXTENDED RELEASE ORAL DAILY
Qty: 30 CAPSULE | Refills: 0 | Status: CANCELLED | OUTPATIENT
Start: 2022-02-04

## 2022-02-04 NOTE — TELEPHONE ENCOUNTER
Out of pills for 2 days    Pt on adderall 30mg extended release    [facility] is out of stock, she needs this resent new pharm asap [facility] [place] (they have it in [facility])    Could you please call pt when done? [phone]

## 2022-03-04 DIAGNOSIS — F98.8 ATTENTION DEFICIT DISORDER (ADD) WITHOUT HYPERACTIVITY: ICD-10-CM

## 2022-03-06 RX ORDER — DEXTROAMPHETAMINE SACCHARATE, AMPHETAMINE ASPARTATE MONOHYDRATE, DEXTROAMPHETAMINE SULFATE AND AMPHETAMINE SULFATE 7.5; 7.5; 7.5; 7.5 MG/1; MG/1; MG/1; MG/1
30 CAPSULE, EXTENDED RELEASE ORAL DAILY
Qty: 30 CAPSULE | Refills: 0 | Status: SHIPPED | OUTPATIENT
Start: 2022-03-06 | End: 2022-04-04 | Stop reason: SDUPTHER

## 2022-04-04 DIAGNOSIS — F98.8 ATTENTION DEFICIT DISORDER (ADD) WITHOUT HYPERACTIVITY: ICD-10-CM

## 2022-04-04 RX ORDER — DEXTROAMPHETAMINE SACCHARATE, AMPHETAMINE ASPARTATE MONOHYDRATE, DEXTROAMPHETAMINE SULFATE AND AMPHETAMINE SULFATE 7.5; 7.5; 7.5; 7.5 MG/1; MG/1; MG/1; MG/1
30 CAPSULE, EXTENDED RELEASE ORAL DAILY
Qty: 30 CAPSULE | Refills: 0 | Status: SHIPPED | OUTPATIENT
Start: 2022-04-04 | End: 2022-05-06 | Stop reason: SDUPTHER

## 2022-04-04 NOTE — TELEPHONE ENCOUNTER
Requested medication(s) are due for refill today: Yes  Patient has already received a courtesy refill: No  Other reason request has been forwarded to provider: 60 y/o F pt w/ PMHx of Depression, Anxiety, Bipolar Affective Disorder, and Fibromyalgia presents to ED c/o lacerations s/p dog bite R arm today. Pt states that she and her dog were bit by another dog at the dog park today. Pt reports that her dog's vaccinations are UTD; pt does not know if the other dog is vaccinated. Pt denies fever, chills, or any other complaints. Last tetanus unknown per pt. NKDA.

## 2022-04-29 ENCOUNTER — LAB (OUTPATIENT)
Dept: LAB | Facility: HOSPITAL | Age: 52
End: 2022-04-29
Attending: INTERNAL MEDICINE
Payer: COMMERCIAL

## 2022-04-29 ENCOUNTER — TELEPHONE (OUTPATIENT)
Dept: HEMATOLOGY ONCOLOGY | Facility: CLINIC | Age: 52
End: 2022-04-29

## 2022-04-29 DIAGNOSIS — E83.110 HEREDITARY HEMOCHROMATOSIS (HCC): ICD-10-CM

## 2022-04-29 LAB
ALBUMIN SERPL BCP-MCNC: 3.6 G/DL (ref 3.5–5)
ALP SERPL-CCNC: 131 U/L (ref 46–116)
ALT SERPL W P-5'-P-CCNC: 30 U/L (ref 12–78)
ANION GAP SERPL CALCULATED.3IONS-SCNC: 2 MMOL/L (ref 4–13)
AST SERPL W P-5'-P-CCNC: 20 U/L (ref 5–45)
BASOPHILS # BLD AUTO: 0.01 THOUSANDS/ΜL (ref 0–0.1)
BASOPHILS NFR BLD AUTO: 0 % (ref 0–1)
BILIRUB SERPL-MCNC: 1.15 MG/DL (ref 0.2–1)
BUN SERPL-MCNC: 14 MG/DL (ref 5–25)
CALCIUM SERPL-MCNC: 9.3 MG/DL (ref 8.3–10.1)
CHLORIDE SERPL-SCNC: 108 MMOL/L (ref 100–108)
CO2 SERPL-SCNC: 30 MMOL/L (ref 21–32)
CREAT SERPL-MCNC: 0.69 MG/DL (ref 0.6–1.3)
EOSINOPHIL # BLD AUTO: 0.14 THOUSAND/ΜL (ref 0–0.61)
EOSINOPHIL NFR BLD AUTO: 2 % (ref 0–6)
ERYTHROCYTE [DISTWIDTH] IN BLOOD BY AUTOMATED COUNT: 12.3 % (ref 11.6–15.1)
FERRITIN SERPL-MCNC: 16 NG/ML (ref 8–388)
GFR SERPL CREATININE-BSD FRML MDRD: 99 ML/MIN/1.73SQ M
GLUCOSE P FAST SERPL-MCNC: 96 MG/DL (ref 65–99)
HCT VFR BLD AUTO: 46.7 % (ref 34.8–46.1)
HGB BLD-MCNC: 15 G/DL (ref 11.5–15.4)
IMM GRANULOCYTES # BLD AUTO: 0.01 THOUSAND/UL (ref 0–0.2)
IMM GRANULOCYTES NFR BLD AUTO: 0 % (ref 0–2)
IRON SERPL-MCNC: 115 UG/DL (ref 50–170)
LYMPHOCYTES # BLD AUTO: 2.28 THOUSANDS/ΜL (ref 0.6–4.47)
LYMPHOCYTES NFR BLD AUTO: 36 % (ref 14–44)
MCH RBC QN AUTO: 31.2 PG (ref 26.8–34.3)
MCHC RBC AUTO-ENTMCNC: 32.1 G/DL (ref 31.4–37.4)
MCV RBC AUTO: 97 FL (ref 82–98)
MONOCYTES # BLD AUTO: 0.52 THOUSAND/ΜL (ref 0.17–1.22)
MONOCYTES NFR BLD AUTO: 8 % (ref 4–12)
NEUTROPHILS # BLD AUTO: 3.38 THOUSANDS/ΜL (ref 1.85–7.62)
NEUTS SEG NFR BLD AUTO: 54 % (ref 43–75)
NRBC BLD AUTO-RTO: 0 /100 WBCS
PLATELET # BLD AUTO: 213 THOUSANDS/UL (ref 149–390)
PMV BLD AUTO: 11 FL (ref 8.9–12.7)
POTASSIUM SERPL-SCNC: 4.3 MMOL/L (ref 3.5–5.3)
PROT SERPL-MCNC: 7.7 G/DL (ref 6.4–8.2)
RBC # BLD AUTO: 4.81 MILLION/UL (ref 3.81–5.12)
SODIUM SERPL-SCNC: 140 MMOL/L (ref 136–145)
TIBC SERPL-MCNC: 292 UG/DL (ref 250–450)
WBC # BLD AUTO: 6.34 THOUSAND/UL (ref 4.31–10.16)

## 2022-04-29 PROCEDURE — 83540 ASSAY OF IRON: CPT

## 2022-04-29 PROCEDURE — 83918 ORGANIC ACIDS TOTAL QUANT: CPT

## 2022-04-29 PROCEDURE — 36415 COLL VENOUS BLD VENIPUNCTURE: CPT

## 2022-04-29 PROCEDURE — 83550 IRON BINDING TEST: CPT

## 2022-04-29 PROCEDURE — 85025 COMPLETE CBC W/AUTO DIFF WBC: CPT

## 2022-04-29 PROCEDURE — 80053 COMPREHEN METABOLIC PANEL: CPT

## 2022-04-29 PROCEDURE — 82728 ASSAY OF FERRITIN: CPT

## 2022-04-29 NOTE — TELEPHONE ENCOUNTER
Appointment Cancellation Or Reschedule     Person calling in Patient    Provider [facility]   Office Visit Date and Time 1/24/22 at 1   Office Visit Location [facility]   Did patient want to reschedule their office appointment? If so, when was it scheduled to? 5/9/22 at 1   Is this patient calling to reschedule an infusion appointment? No   When is their next infusion appointment? NA   Is this patient a Chemo patient? No   Reason for Cancellation or Reschedule Reschedule canceled appointment      If the patient is a treatment patient, please route this to the office nurse  If the patient is not on treatment, please route to the office MA

## 2022-05-05 NOTE — PROGRESS NOTES
Hematology/Oncology Outpatient Follow- up Note  Stephanie Lawrence, 1970, [id number]  2022        Chief Complaint   Patient presents with    Follow-up       HPI:  Jewell Treviño is a 46 yo female with hereditary hemochromatosis   She had a MRI completed on 2018 revealing liver iron concentration of 74 umol/g indicating slightly overload   Otherwise the MRI was normal  She has been undergoing phlebotomy at [facility]  Previous Hematologic/ Oncologic History:    Phlebotomy    Current Hematologic/ Oncologic Treatment:    Phlebotomy every 2-3 months     ECO - Asymptomatic    Interval History:    The patient presents for routine follow up  Most recent blood work completed on 22 was reviewed  CBC within normal limits  Hemoglobin 15 0, hematocrit 46 7  Iron panel: Ferritin 16, serum iron 115, TIBC 292  MMA normal     Patient reports she feels well overall  Denies fatigue, CP, SOB, headaches, lightheadedness, abdominal pain, nausea, vomiting  Patient has had some trouble scheduling monthly phlebotomy with [facility]  She was last phlebotomized 2 months ago  Patient will be meeting with [facility] weight loss management and hopes to start their plan with a low-calorie diet consisting of mostly supplements  Test Results:    Imaging: No results found      Labs:   Lab Results   Component Value Date    WBC 6 34 2022    HGB 15 0 2022    HCT 46 7 (H) 2022    MCV 97 2022     2022     Lab Results   Component Value Date     2014    K 4 3 2022     2022    CO2 30 2022    ANIONGAP 1 (L) 2014    BUN 14 2022    CREATININE 0 69 2022    GLUCOSE 89 2014    GLUF 96 2022    CALCIUM 9 3 2022    CORRECTEDCA 9 8 2021    AST 20 2022    ALT 30 2022    ALKPHOS 131 (H) 2022    PROT 6 9 2014    BILITOT 1 0 2014    EGFR 99 2022 Review of Systems   Constitutional: Negative  Negative for fatigue and unexpected weight change  Respiratory: Negative  Negative for shortness of breath  Cardiovascular: Negative  Negative for chest pain and leg swelling  Gastrointestinal: Negative  Negative for abdominal pain, constipation, nausea and vomiting  Neurological: Negative  Negative for dizziness, light-headedness and headaches  All other systems reviewed and are negative          Active Problems:   Patient Active Problem List   Diagnosis    ADD (attention deficit disorder)    Allergic rhinitis    [facility]red[facility] hem[facility] )    HUMPHREY (obstructive sleep apnea)       Past Medical History:   Past Medical History:   Diagnosis Date    Class 2 severe obesity due to excess calories with serious comorbidity and body mass index (BMI) of 35 0 to 35 9 in adult [facility])     Hemochromatosis     Hereditary hemochromatosis ([facility] ) 4/5/2018    IBS (irritable bowel syndrome)        Surgical History:   Past Surgical History:   Procedure Laterality Date    COLONOSCOPY      poor prep colonoscopy 2016    HYSTEROSCOPY W/ ENDOMETRIAL ABLATION      NASAL SEPTUM SURGERY      DC CORRJ HALLUX VALGUS W/SESMDC W/1METAR MEDIAL CNF Left 03/19/2021    Procedure: BUNIONECTOMY LAPIPLASTY LEFT FOOT;  Surgeon: Kristina Bustamante DPM;  Location:  MAIN OR;  Service: Podiatry       Family History:    Family History   Problem Relation Age of Onset    Uterine cancer Mother 61    No Known Problems Father     No Known Problems Sister     No Known Problems Daughter     No Known Problems Maternal Grandmother     No Known Problems Maternal Grandfather     No Known Problems Paternal Grandmother     No Known Problems Paternal Grandfather     No Known Problems Daughter     No Known Problems Maternal Aunt     No Known Problems Paternal Aunt     No Known Problems Paternal Aunt     Colon polyps Neg Hx     Colon cancer Neg Hx        Cancer-related family history includes Uterine cancer (age of onset: 61) in her mother  There is no history of Colon cancer  Social History:   Social History     Socioeconomic History    Marital status: /Civil Union     Spouse name: Not on file    Number of children: Not on file    Years of education: Not on file    Highest education level: Not on file   Occupational History    Not on file   Tobacco Use    Smoking status: Former Smoker     Packs/day: 0 20     Years: 5 00     Pack years: 1 00     Quit date:      Years since quittin 3    Smokeless tobacco: Never Used   Vaping Use    Vaping Use: Never used   Substance and Sexual Activity    Alcohol use: Yes    Drug use: No    Sexual activity: Not on file   Other Topics Concern    Not on file   Social History Narrative    Not on file     Social Determinants of Health     Financial Resource Strain: Not on file   Food Insecurity: Not on file   Transportation Needs: Not on file   Physical Activity: Not on file   Stress: Not on file   Social Connections: Not on file   Intimate Partner Violence: Not on file   Housing Stability: Not on file       Current Medications:   Current Outpatient Medications   Medication Sig Dispense Refill    amphetamine-dextroamphetamine (ADDERALL XR) 30 MG 24 hr capsule Take 1 capsule (30 mg total) by mouth daily Max Daily Amount: 30 mg 30 capsule 0    cetirizine (ZyrTEC) 10 mg tablet Take 10 mg by mouth daily      Cholecalciferol (VITAMIN D) 2000 units CAPS Take 1 capsule by mouth daily       Multiple Vitamin (multivitamin) tablet Take 1 tablet by mouth daily      Probiotic Product (PROBIOTIC DAILY PO) Take by mouth      triamcinolone (KENALOG) 0 1 % cream Apply topically 2 (two) times a day 30 g 0     No current facility-administered medications for this visit         Allergies:   No Known Allergies    Physical Exam:  /70 (BP Location: Left arm, Patient Position: Sitting, Cuff Size: Adult)   Pulse 75   Temp 97 8 °F (36 6 °C) (Temporal)   Resp 16   Ht 6' (1 829 m)   Wt 123 kg (272 lb)   SpO2 95%   BMI 36 89 kg/m²   Body surface area is 2 42 meters squared  Wt Readings from Last 3 Encounters:   05/09/22 123 kg (272 lb)   01/18/22 120 kg (265 lb)   12/07/21 120 kg (265 lb)           Physical Exam  Constitutional:       Appearance: Normal appearance  She is not ill-appearing or toxic-appearing  HENT:      Head: Normocephalic and atraumatic  Nose: Nose normal    Eyes:      General: No scleral icterus  Right eye: No discharge  Left eye: No discharge  Extraocular Movements: Extraocular movements intact  Conjunctiva/sclera: Conjunctivae normal    Cardiovascular:      Rate and Rhythm: Normal rate and regular rhythm  Heart sounds: No murmur heard  Pulmonary:      Effort: Pulmonary effort is normal       Breath sounds: Normal breath sounds  Abdominal:      General: Bowel sounds are normal  There is no distension  Palpations: Abdomen is soft  Tenderness: There is no abdominal tenderness  Musculoskeletal:      Right lower leg: No tenderness  No edema  Left lower leg: No tenderness  No edema  Skin:     General: Skin is warm and dry  Neurological:      General: No focal deficit present  Mental Status: She is alert  Psychiatric:         Mood and Affect: Mood normal          Behavior: Behavior normal          Assessment / Plan:    1  Hereditary hemochromatosis [facility])      The patient is a 46 yo female with hereditary hemochromatosis   She had a MRI completed on 05/17/2018 revealing liver iron concentration of 74 umol/g indicating slightly overload   Otherwise the MRI was normal  She has [facility] [facility]  Most recent labs do not demonstrate evidence of iron overload  Hemoglobin 15 0, hematocrit 46 7, ferritin 16, serum iron 115  She was last phlebotomized with [facility] 2 months ago       Patient's labs remain in acceptable range after going two months without phlebotomy  Discussed with patient that she could get phlebotomized every 3 months  Patient states that she is eligible to donate blood without a script every 58-60 days and has no reported side effects from blood donation     Patient will be meeting with [facility] weight loss management and hopes to start their plan with a low-calorie diet consisting of mostly supplements  Advised patient to speak with the doctor and dietitian regarding her medical history and her options  Discussed with patient that we may need to do monthly blood work if her new diet plan will consist of iron-rich supplements  For now, she will continue on her current phlebotomy scheduled and we will repeat blood work in 6 months  Patient verbalized understanding  She is in agreement with the plan  Barriers to care: None  Patient able to self care  Portions of the record may have been created with voice recognition software  Occasional wrong word or "sound a like" substitutions may have occurred due to the inherent limitations of voice recognition software  Read the chart carefully and recognize, using context, where substitutions have occurred

## 2022-05-06 DIAGNOSIS — F98.8 ATTENTION DEFICIT DISORDER (ADD) WITHOUT HYPERACTIVITY: ICD-10-CM

## 2022-05-07 LAB — METHYLMALONATE SERPL-SCNC: 196 NMOL/L (ref 0–378)

## 2022-05-08 RX ORDER — DEXTROAMPHETAMINE SACCHARATE, AMPHETAMINE ASPARTATE MONOHYDRATE, DEXTROAMPHETAMINE SULFATE AND AMPHETAMINE SULFATE 7.5; 7.5; 7.5; 7.5 MG/1; MG/1; MG/1; MG/1
30 CAPSULE, EXTENDED RELEASE ORAL DAILY
Qty: 30 CAPSULE | Refills: 0 | Status: SHIPPED | OUTPATIENT
Start: 2022-05-08 | End: 2022-06-06 | Stop reason: SDUPTHER

## 2022-05-09 ENCOUNTER — OFFICE VISIT (OUTPATIENT)
Dept: HEMATOLOGY ONCOLOGY | Facility: HOSPITAL | Age: 52
End: 2022-05-09
Payer: COMMERCIAL

## 2022-05-09 VITALS
DIASTOLIC BLOOD PRESSURE: 70 MMHG | OXYGEN SATURATION: 95 % | SYSTOLIC BLOOD PRESSURE: 110 MMHG | TEMPERATURE: 97.8 F | HEIGHT: 72 IN | WEIGHT: 272 LBS | BODY MASS INDEX: 36.84 KG/M2 | RESPIRATION RATE: 16 BRPM | HEART RATE: 75 BPM

## 2022-05-09 DIAGNOSIS — E83.110 HEREDITARY HEMOCHROMATOSIS (HCC): Primary | ICD-10-CM

## 2022-05-09 PROCEDURE — 99214 OFFICE O/P EST MOD 30 MIN: CPT | Performed by: NURSE PRACTITIONER

## 2022-05-20 ENCOUNTER — OFFICE VISIT (OUTPATIENT)
Dept: BARIATRICS | Facility: CLINIC | Age: 52
End: 2022-05-20
Payer: COMMERCIAL

## 2022-05-20 ENCOUNTER — APPOINTMENT (OUTPATIENT)
Dept: LAB | Facility: HOSPITAL | Age: 52
End: 2022-05-20
Payer: COMMERCIAL

## 2022-05-20 VITALS
WEIGHT: 270.9 LBS | HEIGHT: 72 IN | DIASTOLIC BLOOD PRESSURE: 86 MMHG | SYSTOLIC BLOOD PRESSURE: 126 MMHG | BODY MASS INDEX: 36.69 KG/M2 | HEART RATE: 90 BPM | RESPIRATION RATE: 16 BRPM

## 2022-05-20 DIAGNOSIS — E83.110 HEREDITARY HEMOCHROMATOSIS (HCC): ICD-10-CM

## 2022-05-20 DIAGNOSIS — E66.9 OBESITY, CLASS II, BMI 35-39.9: ICD-10-CM

## 2022-05-20 DIAGNOSIS — G47.33 OSA (OBSTRUCTIVE SLEEP APNEA): ICD-10-CM

## 2022-05-20 DIAGNOSIS — E66.9 OBESITY, CLASS II, BMI 35-39.9: Primary | ICD-10-CM

## 2022-05-20 DIAGNOSIS — F98.8 ATTENTION DEFICIT DISORDER, UNSPECIFIED HYPERACTIVITY PRESENCE: ICD-10-CM

## 2022-05-20 DIAGNOSIS — E66.9 ADULT-ONSET OBESITY: ICD-10-CM

## 2022-05-20 LAB
ALBUMIN SERPL BCP-MCNC: 3.5 G/DL (ref 3.5–5)
ALP SERPL-CCNC: 119 U/L (ref 46–116)
ALT SERPL W P-5'-P-CCNC: 28 U/L (ref 12–78)
ANION GAP SERPL CALCULATED.3IONS-SCNC: 2 MMOL/L (ref 4–13)
AST SERPL W P-5'-P-CCNC: 20 U/L (ref 5–45)
BILIRUB SERPL-MCNC: 0.84 MG/DL (ref 0.2–1)
BUN SERPL-MCNC: 15 MG/DL (ref 5–25)
CALCIUM SERPL-MCNC: 9.7 MG/DL (ref 8.3–10.1)
CHLORIDE SERPL-SCNC: 107 MMOL/L (ref 100–108)
CO2 SERPL-SCNC: 31 MMOL/L (ref 21–32)
CREAT SERPL-MCNC: 0.8 MG/DL (ref 0.6–1.3)
GFR SERPL CREATININE-BSD FRML MDRD: 84 ML/MIN/1.73SQ M
GLUCOSE P FAST SERPL-MCNC: 93 MG/DL (ref 65–99)
MAGNESIUM SERPL-MCNC: 2 MG/DL (ref 1.6–2.6)
POTASSIUM SERPL-SCNC: 3.8 MMOL/L (ref 3.5–5.3)
PROT SERPL-MCNC: 7.5 G/DL (ref 6.4–8.2)
SODIUM SERPL-SCNC: 140 MMOL/L (ref 136–145)

## 2022-05-20 PROCEDURE — 36415 COLL VENOUS BLD VENIPUNCTURE: CPT

## 2022-05-20 PROCEDURE — 80053 COMPREHEN METABOLIC PANEL: CPT

## 2022-05-20 PROCEDURE — 83735 ASSAY OF MAGNESIUM: CPT

## 2022-05-20 PROCEDURE — 99204 OFFICE O/P NEW MOD 45 MIN: CPT | Performed by: NURSE PRACTITIONER

## 2022-05-20 PROCEDURE — 3008F BODY MASS INDEX DOCD: CPT | Performed by: NURSE PRACTITIONER

## 2022-05-20 PROCEDURE — 93005 ELECTROCARDIOGRAM TRACING: CPT

## 2022-05-20 PROCEDURE — 1036F TOBACCO NON-USER: CPT | Performed by: NURSE PRACTITIONER

## 2022-05-20 RX ORDER — FLUTICASONE PROPIONATE 50 MCG
1 SPRAY, SUSPENSION (ML) NASAL DAILY
COMMUNITY

## 2022-05-20 NOTE — PROGRESS NOTES
Assessment/Plan:  Mariella Zuniga was seen today for consult  Diagnoses and all orders for this visit:    Obesity, Class II, BMI 35-39 9  - Discussed options of HealthyCORE-Intensive Lifestyle Intervention Program, Very Low Calorie Diet-VLCD and Conservative Program and the role of weight loss medications  - Patient is interested in pursuing Very Low Calorie Diet-VLCD  - Initial weight loss goal of 5-10% weight loss for improved health  - Weight loss can improve patient's co-morbid conditions and/or prevent weight-related complications  - Stop Olga Luis HUMPHREY has CPAP  - Samples of [employer] products given  - Check CMP, magnesium, and EKG prior to scheduling VLCD start  - She has discussed the VLCD diet with her hematologist who follows her for hemochromatosis, and more frequent monitoring will be occurring through hematology  VLCD Review:  Contraindications: no  Labs ordered: yes  EKG ordered: yes  HTN meds addressed: n/a  DM2 meds addressed: n/a  VLCD time restriction based on BMI: no  LMP/OCP: June 2019, postmenopausal     HUMPHREY (obstructive sleep apnea)  - Has CPAP, but has not been using regularly due to difficulty with mask  New mask is ordered  Regular use of CPAP encouraged  May improve with significant weight loss  Attention deficit disorder, unspecified hyperactivity presence  - Taking Adderall  Continue management with prescribing provider  Hereditary hemachromatosis   - Continue follow-up with hematology  Follow up in approximately 6 weeks after VLCD start with Non-Surgical Physician/Advanced Practitioner  Subjective:   Chief Complaint   Patient presents with    Consult     MWM consult; waist 44 5; goal wt 200; pt is being treated for sleep apnea       Patient ID: Horace Carrillo  is a 47 y o  female with excess weight/obesity here to pursue weight management  Previous notes and records have been reviewed      Past Medical History:   Diagnosis Date    Class 2 severe obesity due to excess calories with serious comorbidity and body mass index (BMI) of 35 0 to 35 9 in adult [facility])     Hemochromatosis     Hereditary hemochromatosis ([address] ) 4/5/2018    IBS (irritable bowel syndrome)      Past Surgical History:   Procedure Laterality Date    COLONOSCOPY      poor prep colonoscopy 2016    HYSTEROSCOPY W/ ENDOMETRIAL ABLATION      NASAL SEPTUM SURGERY      AL CORRJ HALLUX VALGUS W/SESMDC W/1METAR MEDIAL CNF Left 03/19/2021    Procedure: BUNIONECTOMY LAPIPLASTY LEFT FOOT;  Surgeon: Pat Armenta DPM;  Location:  MAIN OR;  Service: Podiatry       HPI:  Wt Readings from Last 20 Encounters:   05/20/22 123 kg (270 lb 14 4 oz)   05/09/22 123 kg (272 lb)   01/18/22 120 kg (265 lb)   12/07/21 120 kg (265 lb)   09/17/21 118 kg (260 lb)   09/14/21 118 kg (260 lb)   08/04/21 117 kg (257 lb 12 8 oz)   07/26/21 118 kg (261 lb)   06/23/21 118 kg (260 lb)   06/22/21 118 kg (260 lb 3 2 oz)   05/18/21 111 kg (245 lb)   05/10/21 113 kg (250 lb)   04/28/21 112 kg (246 lb 14 6 oz)   04/21/21 112 kg (247 lb)   03/19/21 112 kg (247 lb)   03/03/21 114 kg (252 lb)   03/01/21 114 kg (252 lb)   02/10/21 109 kg (240 lb)   02/10/21 112 kg (247 lb)   01/15/21 109 kg (240 lb)     Obesity/Excess Weight:  Severity: Moderate  Onset:  10 yeras    Modifiers: Diet and Exercise and Commercial Weight Loss Programs-ie  Weight Watchers, [employer], Nutrisystem, etc   Contributing factors: Menopause  Associated symptoms: comorbid conditions, fatigue and frustrated    Hydration: 40 oz water, 2 cups of tea - black  Alcohol: 1 glass of wine every other month  Smoking: denies  Exercise: Walking 3 days per week for 2 5 miles   Occupation: [employer]  Sleep: 6-7 hours  STOP bang: HUMPHREY on CPAP - not using past month due to mask difficulty    Highest weight: current  Current weight: 270 9 lbs  Goal weight: no particular goal, wants to feel healthy  Ideally under 200 lbs       Colonoscopy: UTD  Mammogram: UTD    The following portions of the patient's history were reviewed and updated as appropriate: allergies, current medications, past family history, past medical history, past social history, past surgical history, and problem list     Review of Systems   HENT: Negative for sore throat  Respiratory: Negative for cough and shortness of breath  Cardiovascular: Negative for chest pain and palpitations  Gastrointestinal: Positive for constipation (GI aware) and diarrhea (GI aware)  Negative for nausea and vomiting         + GERD controlled with medication   Endocrine: Positive for cold intolerance  Negative for heat intolerance  Genitourinary: Negative for dysuria  Musculoskeletal: Negative for arthralgias and back pain  Skin: Negative for rash  Neurological: Negative for headaches  Psychiatric/Behavioral: Negative for suicidal ideas (or HI)  Denies depression and anxiety       Objective:  /86   Pulse 90   Resp 16   Ht 5' 11 75" (1 822 m)   Wt 123 kg (270 lb 14 4 oz)   Breastfeeding No   BMI 37 00 kg/m²     Physical Exam  Vitals and nursing note reviewed  Constitutional   General appearance: Abnormal   well developed and obese  Eyes No conjunctival injection  Ears, Nose, Mouth, and Throat Oral mucosa moist    Pulmonary   Respiratory effort: No increased work of breathing or signs of respiratory distress  Cardiovascular     Examination of extremities for edema and/or varicosities: Normal   no edema  Abdomen   Abdomen: Abnormal   The abdomen was obese      Musculoskeletal   Gait and station: Normal     Psychiatric   Orientation to person, place and time: Normal     Affect: appropriate

## 2022-05-27 LAB
ATRIAL RATE: 82 BPM
P AXIS: 33 DEGREES
PR INTERVAL: 160 MS
QRS AXIS: 31 DEGREES
QRSD INTERVAL: 76 MS
QT INTERVAL: 364 MS
QTC INTERVAL: 425 MS
T WAVE AXIS: 29 DEGREES
VENTRICULAR RATE: 82 BPM

## 2022-05-27 PROCEDURE — 93010 ELECTROCARDIOGRAM REPORT: CPT | Performed by: INTERNAL MEDICINE

## 2022-05-31 ENCOUNTER — TELEPHONE (OUTPATIENT)
Dept: BARIATRICS | Facility: CLINIC | Age: 52
End: 2022-05-31

## 2022-05-31 ENCOUNTER — TELEPHONE (OUTPATIENT)
Dept: FAMILY MEDICINE CLINIC | Facility: HOSPITAL | Age: 52
End: 2022-05-31

## 2022-05-31 DIAGNOSIS — R94.31 ABNORMAL EKG: Primary | ICD-10-CM

## 2022-05-31 NOTE — TELEPHONE ENCOUNTER
EKG done last week and was notified there was something wrong with EKG and that she may have had a heart attack  She has appt with Cariology but not until 6/20  She is freaking out about this information and would like someone to explain things to her  Please review EKG

## 2022-05-31 NOTE — TELEPHONE ENCOUNTER
Reviewed blood work - CMP and magnesium  Labs within acceptable range  Advised to follow-up with primary care provider for slight elevation in alkaline phosphatase  EKG was abnormal with evidence of septal infarction - age indeterminate  She denies chest pain  Referral to cardiology placed for further evaluation  She was advised to go to the emergency room if she develops chest pain  Will hold off on VLCD start until evaluation can be completed through cardiology

## 2022-05-31 NOTE — TELEPHONE ENCOUNTER
Patient would like to speak with Sukhi Fraction  Test results from another provider, EKG, abnormal   Has appt with cardiology    She has questions

## 2022-05-31 NOTE — TELEPHONE ENCOUNTER
This is not an uncommon finding  Yes, it could mean at one point she may have suffered injury to her heart (like an MI), but it also may be due to poor lead placement for ECG  I advise she follow up with cardiology and they may repeat the ECG and possible further evaluation  She will be fine to wait until 6/20 as long as she is feeling ok and not having any chest pain episodes

## 2022-06-06 DIAGNOSIS — F98.8 ATTENTION DEFICIT DISORDER (ADD) WITHOUT HYPERACTIVITY: ICD-10-CM

## 2022-06-07 RX ORDER — DEXTROAMPHETAMINE SACCHARATE, AMPHETAMINE ASPARTATE MONOHYDRATE, DEXTROAMPHETAMINE SULFATE AND AMPHETAMINE SULFATE 7.5; 7.5; 7.5; 7.5 MG/1; MG/1; MG/1; MG/1
30 CAPSULE, EXTENDED RELEASE ORAL DAILY
Qty: 30 CAPSULE | Refills: 0 | Status: SHIPPED | OUTPATIENT
Start: 2022-06-07 | End: 2022-07-08 | Stop reason: SDUPTHER

## 2022-06-14 DIAGNOSIS — Z12.31 ENCOUNTER FOR SCREENING MAMMOGRAM FOR BREAST CANCER: Primary | ICD-10-CM

## 2022-06-20 ENCOUNTER — RA CDI HCC (OUTPATIENT)
Dept: OTHER | Facility: HOSPITAL | Age: 52
End: 2022-06-20

## 2022-06-20 ENCOUNTER — CONSULT (OUTPATIENT)
Dept: CARDIOLOGY CLINIC | Facility: CLINIC | Age: 52
End: 2022-06-20
Payer: COMMERCIAL

## 2022-06-20 VITALS
WEIGHT: 273.2 LBS | HEIGHT: 72 IN | BODY MASS INDEX: 37 KG/M2 | DIASTOLIC BLOOD PRESSURE: 62 MMHG | SYSTOLIC BLOOD PRESSURE: 150 MMHG | HEART RATE: 73 BPM

## 2022-06-20 DIAGNOSIS — E66.9 OBESITY (BMI 35.0-39.9 WITHOUT COMORBIDITY): ICD-10-CM

## 2022-06-20 DIAGNOSIS — R60.0 LEG EDEMA: ICD-10-CM

## 2022-06-20 DIAGNOSIS — R94.31 ABNORMAL EKG: Primary | ICD-10-CM

## 2022-06-20 PROCEDURE — 99204 OFFICE O/P NEW MOD 45 MIN: CPT | Performed by: INTERNAL MEDICINE

## 2022-06-20 PROCEDURE — 93000 ELECTROCARDIOGRAM COMPLETE: CPT | Performed by: INTERNAL MEDICINE

## 2022-06-20 NOTE — PROGRESS NOTES
[facility]  coding opportunities       Chart reviewed, no opportunity found: CHART REVIEWED, NO OPPORTUNITY FOUND        Patients Insurance        Commercial Insurance: [employer]

## 2022-06-20 NOTE — PROGRESS NOTES
[address] [facility]  Office Consultation  Polly Coon 47 y o  female MRN: [id number]        Chief Complaint    Chief Complaint   Patient presents with    Consult     Est care   Palpitations     When dehydrated    Edema     Legs and feet       Referring Provider: NATHALIE Steiner    Impression & Plan:    1  Abnormal EKG   Prior EKG abnormal due to c/f septal infarct  This was likely misplacement of anterior precordial leads  With appropriate lead placement in the office today the EKG has normalized  May also be positional, as patient was sitting upright during EKG of concern, which can also cause axis shift    - Ambulatory referral to Cardiology  - POCT ECG    2  Leg edema   Suspect LE venous insufficiency  Risk factors include age and obesity  No other s/s of CHF  Concomitant varicosities support venous origin  Continue conservative management  3  Obesity (BMI 35 0-39 9 without comorbidity)   Following with bariatrics  Considering VLCD  No cardiac contraindications exist  From a cardiac standpoint, no further work-up recommended prior to initiating VLCD diet  We will see Polly Coon back as needed  HPI: Polly Coon is a 47y o  year old female who presents for further evaluation of an abnormal ECG  She had an ECG done on 5/20/22 as part of her bariatric evaluation  Study at that time showed NSR, 82 bpm, normal axis, and poor precordial R wave progression c/f septal MI age undetermined  No prior history of known heart disease  No chest pain or pressure history  Patient reached out to primary care CRNP and was advised likely lead placement issue  Patient was sent to cardiology for further evaluation by bariatric CRNP  No history of significant cardiac symptoms  She walks 3-4 times per week  No chest pain or pressure  No dyspnea, orthopnea, PND   She does have chronic LE edema previously worked up by vascular surgery and cardiology; was told no significant abnormalities  She wears compression stockings occasionally and elevates  Of note, concomitant varicosities are present throughout LE  Review of Systems   Constitutional: Negative for activity change and fatigue  HENT: Negative for facial swelling  Eyes: Negative for visual disturbance  Respiratory: Negative for chest tightness and shortness of breath  Cardiovascular: Positive for leg swelling  Negative for chest pain and palpitations  Gastrointestinal: Negative for nausea and vomiting  Musculoskeletal: Negative for myalgias  Skin: Negative for pallor  Allergic/Immunologic: Negative for immunocompromised state  Neurological: Negative for dizziness, syncope and light-headedness  Psychiatric/Behavioral: Negative for agitation and confusion  The patient is not nervous/anxious            Past Medical History:   Diagnosis Date    Class 2 severe obesity due to excess calories with serious comorbidity and body mass index (BMI) of 35 0 to 35 9 in adult [facility])     Hemochromatosis     Hereditary hemochromatosis ([facility] [address] ) 2018    IBS (irritable bowel syndrome)      Past Surgical History:   Procedure Laterality Date    BUNIONECTOMY Left 2021    COLONOSCOPY      poor prep colonoscopy 2016    HYSTEROSCOPY W/ ENDOMETRIAL ABLATION      NASAL SEPTUM SURGERY      NH CORRJ HALLUX VALGUS W/SESMDC W/1METAR MEDIAL CNF Left 2021    Procedure: BUNIONECTOMY LAPIPLASTY LEFT FOOT;  Surgeon: Omari Martínez DPM;  Location:  MAIN OR;  Service: Podiatry     Social History     Substance and Sexual Activity   Alcohol Use Yes     Social History     Substance and Sexual Activity   Drug Use No     Social History     Tobacco Use   Smoking Status Former Smoker    Packs/day: 0 20    Years: 5 00    Pack years: 1 00    Quit date:     Years since quittin 4   Smokeless Tobacco Never Used     Family History   Problem Relation Age of Onset    Hypertension Mother     Uterine cancer Mother 59    Arrhythmia Mother         has defibrillator    Heart disease Father     No Known Problems Sister     Heart attack Maternal Aunt         76years old    Heart disease Maternal Aunt     No Known Problems Paternal Aunt     No Known Problems Paternal Aunt     No Known Problems Maternal Grandmother     Heart attack Maternal Grandfather     No Known Problems Paternal Grandmother     Heart attack Paternal Grandfather     No Known Problems Daughter     No Known Problems Daughter     Colon polyps Neg Hx     Colon cancer Neg Hx     Hyperlipidemia Neg Hx     Thyroid disease Neg Hx     Stroke Neg Hx        Allergies:  No Known Allergies    Medications (as of START of this encounter): Outpatient Medications Prior to Visit   Medication Sig Dispense Refill    amphetamine-dextroamphetamine (ADDERALL XR) 30 MG 24 hr capsule Take 1 capsule (30 mg total) by mouth daily Max Daily Amount: 30 mg 30 capsule 0    cetirizine (ZyrTEC) 10 mg tablet Take 10 mg by mouth daily      Cholecalciferol (VITAMIN D) 2000 units CAPS Take 1 capsule by mouth daily       fluticasone (FLONASE) 50 mcg/act nasal spray 1 spray into each nostril daily      Multiple Vitamin (multivitamin) tablet Take 1 tablet by mouth daily      Probiotic Product (PROBIOTIC DAILY PO) Take by mouth      triamcinolone (KENALOG) 0 1 % cream Apply topically 2 (two) times a day 30 g 0     No facility-administered medications prior to visit  Vitals:    06/20/22 0906   BP: 150/62   Pulse: 73     Weight (last 2 days)     Date/Time Weight    06/20/22 0906 124 (273 2)          General: Jolie Saavedra is a well appearing female, in no acute distress, sitting comfortably  HEENT: moist mucous membranes, EOMI  Neck:  No JVD, supple, trachea midline   Cardiovascular: unremarkable S1/S2, regular rate and rhythm, no murmurs, rubs or gallops   Pulmonary: normal respiratory effort, CTAB   Abdomen: soft and nondistended  Extremities: No lower extremity edema  Warm and well perfused extremities  Neuro: no focal motor deficits, AAOx3 (person, place, time)  Psych: Normal mood and affect, cooperative      Laboratory Studies:    Laboratory studies personally reviewed    Cardiac testing:     EKG reviewed personally:   EKG today shows NSR, 73 bpm, normal axis, normal intervals  Normal precordial R wave progression  Normal study  Time Spent:  Total time (face-to-face and non-face-to-face) spent on today's visit was 35 minutes  This includes preparation for the visits (i e  reviewing test results), performance of a medically appropriate history and examination, and orders for medications, tests or other procedures  This time is exclusive of procedures performed and time spent teaching  Isabelle Dhillon MD    This note was completed in part utilizing [employer] direct voice recognition software  Grammatical errors, random word insertion, spelling mistakes, occasional wrong word or "sound-alike" substitutions and incomplete sentences may be an occasional consequence of the system secondary to software limitations, ambient noise and hardware issues  At the time of dictation, efforts were made to edit, clarify and /or correct errors  Please read the chart carefully and recognize, using context, where substitutions have occurred  If you have any questions or concerns about the context, text or information contained within the body of this dictation, please contact myself, the provider, for further clarification

## 2022-06-27 ENCOUNTER — OFFICE VISIT (OUTPATIENT)
Dept: FAMILY MEDICINE CLINIC | Facility: HOSPITAL | Age: 52
End: 2022-06-27
Payer: COMMERCIAL

## 2022-06-27 ENCOUNTER — TELEPHONE (OUTPATIENT)
Dept: ADMINISTRATIVE | Facility: OTHER | Age: 52
End: 2022-06-27

## 2022-06-27 VITALS
HEIGHT: 72 IN | OXYGEN SATURATION: 98 % | TEMPERATURE: 97.5 F | HEART RATE: 86 BPM | DIASTOLIC BLOOD PRESSURE: 80 MMHG | WEIGHT: 272 LBS | SYSTOLIC BLOOD PRESSURE: 120 MMHG | BODY MASS INDEX: 36.84 KG/M2

## 2022-06-27 DIAGNOSIS — N39.41 URGE INCONTINENCE OF URINE: ICD-10-CM

## 2022-06-27 DIAGNOSIS — R63.8 UNABLE TO LOSE WEIGHT: ICD-10-CM

## 2022-06-27 DIAGNOSIS — F98.8 ATTENTION DEFICIT DISORDER, UNSPECIFIED HYPERACTIVITY PRESENCE: ICD-10-CM

## 2022-06-27 DIAGNOSIS — Z12.83 SKIN CANCER SCREENING: ICD-10-CM

## 2022-06-27 DIAGNOSIS — Z13.220 SCREENING CHOLESTEROL LEVEL: ICD-10-CM

## 2022-06-27 DIAGNOSIS — Z00.00 ANNUAL PHYSICAL EXAM: Primary | ICD-10-CM

## 2022-06-27 PROCEDURE — 99396 PREV VISIT EST AGE 40-64: CPT | Performed by: NURSE PRACTITIONER

## 2022-06-27 PROCEDURE — 3008F BODY MASS INDEX DOCD: CPT | Performed by: NURSE PRACTITIONER

## 2022-06-27 PROCEDURE — 3725F SCREEN DEPRESSION PERFORMED: CPT | Performed by: NURSE PRACTITIONER

## 2022-06-27 PROCEDURE — 1036F TOBACCO NON-USER: CPT | Performed by: NURSE PRACTITIONER

## 2022-06-27 NOTE — LETTER
Procedure Request Form: Cervical Cancer Screening      Date Requested: 22  Patient: Madelin Willis  Patient : 1970   Referring Provider: Bedelia Dhaval, CRNP        Date of Procedure ______________________________       The above patient has informed us that they have completed their   most recent Cervical Cancer Screening at your facility  Please complete   this form and attach all corresponding procedure reports/results  Comments __________________________________________________________  ____________________________________________________________________  ____________________________________________________________________  ____________________________________________________________________    Facility Completing Procedure _________________________________________    Form Completed By (print name) _______________________________________      Signature __________________________________________________________      These reports are needed for  compliance  Please fax this completed form and a copy of the procedure report to our office located at [place] [address] as soon as possible to [phone] myles Del Rosario: Phone [phone]    We thank you for your assistance in treating our mutual patient

## 2022-06-27 NOTE — TELEPHONE ENCOUNTER
----- Message from Dmitriy Low sent at 6/27/2022  8:32 AM EDT -----  Regarding: pap; [facility]  06/27/22 8:33 AM    Hello, our patient Iliana Milton has had Pap Smear (HPV) aka Cervical Cancer Screening completed/performed  Please assist in updating the patient chart by making an External outreach to [address] [place] [address] located in [facility]  The date of service is pt was unsure, but thinks she has an upcoming appointment  Need last visits note       Thank you,  D[facility] [address]

## 2022-06-27 NOTE — PROGRESS NOTES
[facility] [address]     NAME: Ivonne Dodge  AGE: 47 y o  SEX: female  : 1970     DATE: 2022     Assessment and Plan:     Problem List Items Addressed This Visit    None     Visit Diagnoses     Annual physical exam    -  Primary          Immunizations and preventive care screenings were discussed with patient today  Appropriate education was printed on patient's after visit summary  Counseling:  Alcohol/drug use: discussed moderation in alcohol intake, the recommendations for healthy alcohol use, and avoidance of illicit drug use  Dental Health: discussed importance of regular tooth brushing, flossing, and dental visits  Injury prevention: discussed safety/seat belts, safety helmets, smoke detectors, carbon dioxide detectors, and smoking near bedding or upholstery  Sexual health: discussed sexually transmitted diseases, partner selection, use of condoms, avoidance of unintended pregnancy, and contraceptive alternatives  · Exercise: the importance of regular exercise/physical activity was discussed  Recommend exercise 3-5 times per week for at least 30 minutes  Depression Screening and Follow-up Plan: Patient was screened for depression during today's encounter  They screened negative with a PHQ-2 score of 0  Return in 1 year (on 2023)  Chief Complaint:     Chief Complaint   Patient presents with    Physical Exam      History of Present Illness:     Adult Annual Physical   Patient here for a comprehensive physical exam  The patient reports no problems  Currently working with weight management  Plan to do intensive weight loss plan  Had ECG which was abnormal  Saw cardiology and had testing which was negative  Cleared to begin  Issues with gaining weight since perimenopause  Now menopausal and difficulty losing weight despite multiple diet attempts, healthy lifestyle and exercise  Issues with urge incontinence  Concerned this will worsen as she increases her water intake  ADD symptoms well controlled on Adderall  Feels she is reliant on this to function  Has tried going w/o it and really struggles with mental clarity  Diet and Physical Activity  · Diet/Nutrition: well balanced diet  · Exercise: walking  Depression Screening  PHQ-2/9 Depression Screening    Little interest or pleasure in doing things: 0 - not at all  Feeling down, depressed, or hopeless: 0 - not at all  Trouble falling or staying asleep, or sleeping too much: 0 - not at all  Feeling tired or having little energy: 0 - not at all  Poor appetite or overeatin - several days  Feeling bad about yourself - or that you are a failure or have let yourself or your family down: 0 - not at all  Trouble concentrating on things, such as reading the newspaper or watching television: 0 - not at all  Moving or speaking so slowly that other people could have noticed  Or the opposite - being so fidgety or restless that you have been moving around a lot more than usual: 0 - not at all  Thoughts that you would be better off dead, or of hurting yourself in some way: 0 - not at all  PHQ-2 Score: 0  PHQ-2 Interpretation: Negative depression screen  PHQ-9 Score: 1   PHQ-9 Interpretation: No or Minimal depression        General Health  · Sleep: sleeps well  · Hearing: normal - bilateral   · Vision: wears contacts  · Dental: regular dental visits  /GYN Health  · Patient is: postmenopausal  · Reports UTD with pap  Managed by Toño Flores  · Due for mammo-order given     Review of Systems:     Review of Systems   Constitutional: Negative  HENT: Negative  Negative for congestion, dental problem, ear pain, hearing loss, postnasal drip, rhinorrhea, sinus pressure, sinus pain, sore throat, tinnitus and trouble swallowing  Eyes: Negative  Respiratory: Negative  Cardiovascular: Negative      Gastrointestinal: Negative  Endocrine: Negative  Genitourinary: Negative  Urge incontinence   Musculoskeletal: Negative  Skin: Negative  Allergic/Immunologic: Negative  Neurological: Negative  Hematological: Negative  Psychiatric/Behavioral: Negative  Past Medical History:     Past Medical History:   Diagnosis Date    Class 2 severe obesity due to excess calories with serious comorbidity and body mass index (BMI) of 35 0 to 35 9 in adult [facility])     Hemochromatosis     Hereditary hemochromatosis ([address] ) 2018    IBS (irritable bowel syndrome)       Past Surgical History:     Past Surgical History:   Procedure Laterality Date    BUNIONECTOMY Left 2021    COLONOSCOPY      poor prep colonoscopy 2016    HYSTEROSCOPY W/ ENDOMETRIAL ABLATION      NASAL SEPTUM SURGERY      [place] CORRJ HALLUX VALGUS W/SESMDC W/1METAR MEDIAL CNF Left 2021    Procedure: BUNIONECTOMY LAPIPLASTY LEFT FOOT;  Surgeon: Ap Vaughan DPM;  [facility];  Service: Podiatry      Social History:     Social History     Socioeconomic History    Marital status: /Civil Union     Spouse name: None    Number of children: None    Years of education: None    Highest education level: None   Occupational History    None   Tobacco Use    Smoking status: Former Smoker     Packs/day: 0 20     Years: 5 00     Pack years: 1 00     Quit date:      Years since quittin 4    Smokeless tobacco: Never Used   Vaping Use    Vaping Use: Never used   Substance and Sexual Activity    Alcohol use:  Yes    Drug use: No    Sexual activity: None   Other Topics Concern    None   Social History Narrative    None     Social Determinants of Health     Financial Resource Strain: Not on file   Food Insecurity: Not on file   Transportation Needs: Not on file   Physical Activity: Not on file   Stress: Not on file   Social Connections: Not on file   Intimate Partner Violence: Not on file   Housing Stability: Not on file Family History:     Family History   Problem Relation Age of Onset    Hypertension Mother     Uterine cancer Mother 61    Arrhythmia Mother         has defibrillator    Heart disease Father     No Known Problems Sister     Heart attack Maternal Aunt         76years old   Roanna Kugel Heart disease Maternal Aunt     No Known Problems Paternal Aunt     No Known Problems Paternal Aunt     No Known Problems Maternal Grandmother     Heart attack Maternal Grandfather     No Known Problems Paternal Grandmother     Heart attack Paternal Grandfather     No Known Problems Daughter     No Known Problems Daughter     Colon polyps Neg Hx     Colon cancer Neg Hx     Hyperlipidemia Neg Hx     Thyroid disease Neg Hx     Stroke Neg Hx       Current Medications:     Current Outpatient Medications   Medication Sig Dispense Refill    amphetamine-dextroamphetamine (ADDERALL XR) 30 MG 24 hr capsule Take 1 capsule (30 mg total) by mouth daily Max Daily Amount: 30 mg 30 capsule 0    cetirizine (ZyrTEC) 10 mg tablet Take 10 mg by mouth daily      Cholecalciferol (VITAMIN D) 2000 units CAPS Take 1 capsule by mouth daily       fluticasone (FLONASE) 50 mcg/act nasal spray 1 spray into each nostril daily      Multiple Vitamin (multivitamin) tablet Take 1 tablet by mouth daily      Probiotic Product (PROBIOTIC DAILY PO) Take by mouth      triamcinolone (KENALOG) 0 1 % cream Apply topically 2 (two) times a day 30 g 0     No current facility-administered medications for this visit  Allergies:     No Known Allergies   Physical Exam:     /80 (BP Location: Left arm, Patient Position: Sitting, Cuff Size: Large)   Pulse 86   Temp 97 5 °F (36 4 °C) (Tympanic)   Ht 6' (1 829 m)   Wt 123 kg (272 lb)   SpO2 98%   BMI 36 89 kg/m²     Physical Exam  Vitals reviewed  Constitutional:       Appearance: Normal appearance  She is obese  HENT:      Head: Normocephalic and atraumatic        Right Ear: Tympanic membrane, ear canal and external ear normal       Left Ear: Tympanic membrane, ear canal and external ear normal       Nose: Nose normal       Mouth/Throat:      Mouth: Mucous membranes are moist       Pharynx: Oropharynx is clear  Eyes:      Conjunctiva/sclera: Conjunctivae normal       Pupils: Pupils are equal, round, and reactive to light  Cardiovascular:      Rate and Rhythm: Normal rate and regular rhythm  Heart sounds: Murmur heard  Pulmonary:      Effort: Pulmonary effort is normal       Breath sounds: Normal breath sounds  Abdominal:      General: Abdomen is flat  Bowel sounds are normal       Palpations: Abdomen is soft  There is no hepatomegaly or splenomegaly  Tenderness: There is no abdominal tenderness  Musculoskeletal:         General: Normal range of motion  Cervical back: Normal range of motion and neck supple  Skin:     General: Skin is warm and dry  Capillary Refill: Capillary refill takes less than 2 seconds  Neurological:      General: No focal deficit present  Mental Status: She is alert and oriented to person, place, and time  Psychiatric:         Mood and Affect: Mood normal          Behavior: Behavior normal          Thought Content:  Thought content normal          Judgment: Judgment normal           Herman Felisa, [address] [place] [address]

## 2022-06-27 NOTE — PATIENT INSTRUCTIONS
Wellness Visit for Adults   AMBULATORY CARE:   A wellness visit  is when you see your healthcare provider to get screened for health problems  Your healthcare provider will also give you advice on how to stay healthy  Write down your questions so you remember to ask them  Ask your healthcare provider how often you should have a wellness visit  What happens at a wellness visit:  Your healthcare provider will ask about your health, and your family history of health problems  This includes high blood pressure, heart disease, and cancer  He or she will ask if you have symptoms that concern you, if you smoke, and about your mood  You may also be asked about your intake of medicines, supplements, food, and alcohol  Any of the following may be done:  · Your weight  will be checked  Your height may also be checked so your body mass index (BMI) can be calculated  Your BMI shows if you are at a healthy weight  · Your blood pressure  and heart rate will be checked  Your temperature may also be checked  · Blood and urine tests  may be done  Blood tests may be done to check your cholesterol levels  Abnormal cholesterol levels increase your risk for heart disease and stroke  You may also need a blood or urine test to check for diabetes if you are at increased risk  Urine tests may be done to look for signs of an infection or kidney disease  · A physical exam  includes checking your heartbeat and lungs with a stethoscope  Your healthcare provider may also check your skin to look for sun damage  · Screening tests  may be recommended  A screening test is done to check for diseases that may not cause symptoms  The screening tests you may need depend on your age, gender, family history, and lifestyle habits  For example, colorectal screening may be recommended if you are 48years old or older  Screening tests you need if you are a woman:   · A Pap smear  is used to screen for cervical cancer   Pap smears are usually done every 3 to 5 years depending on your age  You may need them more often if you have had abnormal Pap smear test results in the past  Ask your healthcare provider how often you should have a Pap smear  · A mammogram  is an x-ray of your breasts to screen for breast cancer  Experts recommend mammograms every 2 years starting at age 48 years  You may need a mammogram at age 52 years or younger if you have an increased risk for breast cancer  Talk to your healthcare provider about when you should start having mammograms and how often you need them  Vaccines you may need:   · Get an influenza vaccine  every year  The influenza vaccine protects you from the flu  Several types of viruses cause the flu  The viruses change over time, so new vaccines are made each year  · Get a tetanus-diphtheria (Td) booster vaccine  every 10 years  This vaccine protects you against tetanus and diphtheria  Tetanus is a severe infection that may cause painful muscle spasms and lockjaw  Diphtheria is a severe bacterial infection that causes a thick covering in the back of your mouth and throat  · Get a human papillomavirus (HPV) vaccine  if you are female and aged 23 to 32 or male 23 to 24 and never received it  This vaccine protects you from HPV infection  HPV is the most common infection spread by sexual contact  HPV may also cause vaginal, penile, and anal cancers  · Get a pneumococcal vaccine  if you are aged 72 years or older  The pneumococcal vaccine is an injection given to protect you from pneumococcal disease  Pneumococcal disease is an infection caused by pneumococcal bacteria  The infection may cause pneumonia, meningitis, or an ear infection  · Get a shingles vaccine  if you are 60 or older, even if you have had shingles before  The shingles vaccine is an injection to protect you from the varicella-zoster virus  This is the same virus that causes chickenpox   Shingles is a painful rash that develops in people who had chickenpox or have been exposed to the virus  How to eat healthy:  My Plate is a model for planning healthy meals  It shows the types and amounts of foods that should go on your plate  Fruits and vegetables make up about half of your plate, and grains and protein make up the other half  A serving of dairy is included on the side of your plate  The amount of calories and serving sizes you need depends on your age, gender, weight, and height  Examples of healthy foods are listed below:  · Eat a variety of vegetables  such as dark green, red, and orange vegetables  You can also include canned vegetables low in sodium (salt) and frozen vegetables without added butter or sauces  · Eat a variety of fresh fruits , canned fruit in 100% juice, frozen fruit, and dried fruit  · Include whole grains  At least half of the grains you eat should be whole grains  Examples include whole-wheat bread, wheat pasta, brown rice, and whole-grain cereals such as oatmeal     · Eat a variety of protein foods such as seafood (fish and shellfish), lean meat, and poultry without skin (turkey and chicken)  Examples of lean meats include pork leg, shoulder, or tenderloin, and beef round, sirloin, tenderloin, and extra lean ground beef  Other protein foods include eggs and egg substitutes, beans, peas, soy products, nuts, and seeds  · Choose low-fat dairy products such as skim or 1% milk or low-fat yogurt, cheese, and cottage cheese  · Limit unhealthy fats  such as butter, hard margarine, and shortening  Exercise:  Exercise at least 30 minutes per day on most days of the week  Some examples of exercise include walking, biking, dancing, and swimming  You can also fit in more physical activity by taking the stairs instead of the elevator or parking farther away from stores  Include muscle strengthening activities 2 days each week  Regular exercise provides many health benefits   It helps you manage your weight, and decreases your risk for type 2 diabetes, heart disease, stroke, and high blood pressure  Exercise can also help improve your mood  Ask your healthcare provider about the best exercise plan for you  General health and safety guidelines:   · Do not smoke  Nicotine and other chemicals in cigarettes and cigars can cause lung damage  Ask your healthcare provider for information if you currently smoke and need help to quit  E-cigarettes or smokeless tobacco still contain nicotine  Talk to your healthcare provider before you use these products  · Limit alcohol  A drink of alcohol is 12 ounces of beer, 5 ounces of wine, or 1½ ounces of liquor  · Lose weight, if needed  Being overweight increases your risk of certain health conditions  These include heart disease, high blood pressure, type 2 diabetes, and certain types of cancer  · Protect your skin  Do not sunbathe or use tanning beds  Use sunscreen with a SPF 15 or higher  Apply sunscreen at least 15 minutes before you go outside  Reapply sunscreen every 2 hours  Wear protective clothing, hats, and sunglasses when you are outside  · Drive safely  Always wear your seatbelt  Make sure everyone in your car wears a seatbelt  A seatbelt can save your life if you are in an accident  Do not use your cell phone when you are driving  This could distract you and cause an accident  Pull over if you need to make a call or send a text message  · Practice safe sex  Use latex condoms if are sexually active and have more than one partner  Your healthcare provider may recommend screening tests for sexually transmitted infections (STIs)  · Wear helmets, lifejackets, and protective gear  Always wear a helmet when you ride a bike or motorcycle, go skiing, or play sports that could cause a head injury  Wear protective equipment when you play sports  Wear a lifejacket when you are on a boat or doing water sports      © Copyright [employer] 2022 Information is for End User's use only and may not be sold, redistributed or otherwise used for commercial purposes  All illustrations and images included in CareNotes® are the copyrighted property of A D A M , Inc  or Luis Antonio Arias  The above information is an  only  It is not intended as medical advice for individual conditions or treatments  Talk to your doctor, nurse or pharmacist before following any medical regimen to see if it is safe and effective for you  Obesity   AMBULATORY CARE:   Obesity  means your body mass index (BMI) is greater than 30  Your healthcare provider will use your height and weight to measure your BMI  The risks of obesity include  many health problems, including injuries or physical disability  · Diabetes (high blood sugar level)    · High blood pressure or high cholesterol    · Heart disease    · Stroke    · Gallbladder or liver disease    · Cancer of the colon, breast, prostate, liver, or kidney    · Sleep apnea    · Arthritis or gout    Screening  is done to check for health conditions before you have signs or symptoms  If you are 28to 79years old, your blood sugar level may be checked every 3 years for signs of prediabetes or diabetes  Your healthcare provider will check your blood pressure at each visit  High blood pressure can lead to a stroke or other problems  Your provider may check for signs of heart disease, cancer, or other health problems  Seek care immediately if:   · You have a severe headache, confusion, or difficulty speaking  · You have weakness on one side of your body  · You have chest pain, sweating, or shortness of breath  Call your doctor if:   · You have symptoms of gallbladder or liver disease, such as pain in your upper abdomen  · You have knee or hip pain and discomfort while walking  · You have symptoms of diabetes, such as intense hunger and thirst, and frequent urination      · You have symptoms of sleep apnea, such as snoring or daytime sleepiness  · You have questions or concerns about your condition or care  Treatment for obesity  focuses on helping you lose weight to improve your health  Even a small decrease in BMI can reduce the risk for many health problems  Your healthcare provider will help you set a weight-loss goal   · Lifestyle changes  are the first step in treating obesity  These include making healthy food choices and getting regular physical activity  Your healthcare provider may suggest a weight-loss program that involves coaching, education, and therapy  · Medicine  may help you lose weight when it is used with a healthy foods and physical activity  · Surgery  can help you lose weight if you are very obese and have other health problems  There are several types of weight-loss surgery  Ask your healthcare provider for more information  Tips for safe weight loss:   · Set small, realistic goals  An example of a small goal is to walk for 20 minutes 5 days a week  Anther goal is to lose 5% of your body weight  · Tell friends, family members, and coworkers about your goals  and ask for their support  Ask a friend to lose weight with you, or join a weight-loss support group  · Identify foods or triggers that may cause you to overeat , and find ways to avoid them  Remove tempting high-calorie foods from your home and workplace  Place a bowl of fresh fruit on your kitchen counter  If stress causes you to eat, then find other ways to cope with stress  A counselor or therapist may be able to help you  · Keep a diary to track what you eat and drink  Also write down how many minutes of physical activity you do each day  Weigh yourself once a week and record it in your diary  Eating changes: You will need to eat 500 to 1,000 fewer calories each day than you currently eat to lose 1 to 2 pounds a week  The following changes will help you cut calories:  · Eat smaller portions    Use small plates, no larger than 9 inches in diameter  Fill your plate half full of fruits and vegetables  Measure your food using measuring cups until you know what a serving size looks like  · Eat 3 meals and 1 or 2 snacks each day  Plan your meals in advance  Mamadou Guardian and eat at home most of the time  Eat slowly  Do not skip meals  Skipping meals can lead to overeating later in the day  This can make it harder for you to lose weight  Talk with a dietitian to help you make a meal plan and schedule that is right for you  · Eat fruits and vegetables at every meal   They are low in calories and high in fiber, which makes you feel full  Do not add butter, margarine, or cream sauce to vegetables  Use herbs to season steamed vegetables  · Eat less fat and fewer fried foods  Eat more baked or grilled chicken and fish  These protein sources are lower in calories and fat than red meat  Limit fast food  Dress your salads with olive oil and vinegar instead of bottled dressing  · Limit the amount of sugar you eat  Do not drink sugary beverages  Limit alcohol  Activity changes:  Physical activity is good for your body in many ways  It helps you burn calories and build strong muscles  It decreases stress and depression, and improves your mood  It can also help you sleep better  Talk to your healthcare provider before you begin an exercise program   · Exercise for at least 30 minutes 5 days a week  Start slowly  Set aside time each day for physical activity that you enjoy and that is convenient for you  It is best to do both weight training and an activity that increases your heart rate, such as walking, bicycling, or swimming  · Find ways to be more active  Do yard work and housecleaning  Walk up the stairs instead of using elevators  Spend your leisure time going to events that require walking, such as outdoor festivals or fairs  This extra physical activity can help you lose weight and keep it off         Follow up with your doctor as directed: You may need to meet with a dietitian  Write down your questions so you remember to ask them during your visits  © Copyright [employer] 2022 Information is for End User's use only and may not be sold, redistributed or otherwise used for commercial purposes  All illustrations and images included in CareNotes® are the copyrighted property of A D A M , Inc  or [facility] Clemencia Gautam   The above information is an  only  It is not intended as medical advice for individual conditions or treatments  Talk to your doctor, nurse or pharmacist before following any medical regimen to see if it is safe and effective for you

## 2022-06-28 NOTE — TELEPHONE ENCOUNTER
Upon review of the In Basket request and the patient's chart, initial outreach has been made via fax, please see Contacts section for details       Thank you  Sherley Patten

## 2022-06-29 ENCOUNTER — OFFICE VISIT (OUTPATIENT)
Dept: BARIATRICS | Facility: CLINIC | Age: 52
End: 2022-06-29

## 2022-06-29 DIAGNOSIS — R63.5 ABNORMAL WEIGHT GAIN: Primary | ICD-10-CM

## 2022-06-29 PROCEDURE — VLCD: Performed by: DIETITIAN, REGISTERED

## 2022-06-29 PROCEDURE — RECHECK: Performed by: DIETITIAN, REGISTERED

## 2022-06-29 NOTE — PROGRESS NOTES
Reviewed VLCD diet principles  Developed meal plan and reviewed product use  Ordered 2 week supply  Gave patient written VLCD education packet and left patient contact number  Will call contact patient in 2-3 days to assess tolerance       Plans to start VLCD on 8/1/22

## 2022-06-29 NOTE — TELEPHONE ENCOUNTER
Upon review of the In Basket request we were able to locate, review, and update the patient chart as requested for Pap Smear (HPV) aka Cervical Cancer Screening  Any additional questions or concerns should be emailed to the Practice Liaisons via [email or web address] email, please do not reply via In Basket      Thank you  Radha Casillas

## 2022-06-30 ENCOUNTER — TELEPHONE (OUTPATIENT)
Dept: BARIATRICS | Facility: CLINIC | Age: 52
End: 2022-06-30

## 2022-06-30 DIAGNOSIS — E83.110 HEREDITARY HEMOCHROMATOSIS (HCC): Primary | ICD-10-CM

## 2022-06-30 DIAGNOSIS — E66.9 OBESITY, CLASS II, BMI 35-39.9: ICD-10-CM

## 2022-07-08 DIAGNOSIS — F98.8 ATTENTION DEFICIT DISORDER (ADD) WITHOUT HYPERACTIVITY: ICD-10-CM

## 2022-07-11 RX ORDER — DEXTROAMPHETAMINE SACCHARATE, AMPHETAMINE ASPARTATE MONOHYDRATE, DEXTROAMPHETAMINE SULFATE AND AMPHETAMINE SULFATE 7.5; 7.5; 7.5; 7.5 MG/1; MG/1; MG/1; MG/1
30 CAPSULE, EXTENDED RELEASE ORAL DAILY
Qty: 30 CAPSULE | Refills: 0 | Status: SHIPPED | OUTPATIENT
Start: 2022-07-11 | End: 2022-08-09 | Stop reason: SDUPTHER

## 2022-07-26 ENCOUNTER — OFFICE VISIT (OUTPATIENT)
Dept: BARIATRICS | Facility: CLINIC | Age: 52
End: 2022-07-26

## 2022-07-26 DIAGNOSIS — R63.5 ABNORMAL WEIGHT GAIN: Primary | ICD-10-CM

## 2022-07-26 PROCEDURE — RECHECK: Performed by: DIETITIAN, REGISTERED

## 2022-07-26 NOTE — PROGRESS NOTES
RD called patient as requested to f/u prior to patient starting VLCD  on 8/1/22  NO answer  RD left voicemail with RD contact information for patient to return phone call  Patient returned phone call  VLCD questions answered  Assisted patient with initial food order  Patient plans to start VLCD on 8/1/22

## 2022-08-01 ENCOUNTER — APPOINTMENT (OUTPATIENT)
Dept: LAB | Facility: HOSPITAL | Age: 52
End: 2022-08-01
Payer: COMMERCIAL

## 2022-08-01 DIAGNOSIS — Z13.220 SCREENING CHOLESTEROL LEVEL: ICD-10-CM

## 2022-08-01 DIAGNOSIS — R63.8 UNABLE TO LOSE WEIGHT: ICD-10-CM

## 2022-08-01 LAB
CHOLEST SERPL-MCNC: 138 MG/DL
HDLC SERPL-MCNC: 54 MG/DL
LDLC SERPL CALC-MCNC: 65 MG/DL (ref 0–100)
NONHDLC SERPL-MCNC: 84 MG/DL
T3FREE SERPL-MCNC: 3.54 PG/ML (ref 2.3–4.2)
T4 FREE SERPL-MCNC: 1.02 NG/DL (ref 0.76–1.46)
TRIGL SERPL-MCNC: 95 MG/DL
TSH SERPL DL<=0.05 MIU/L-ACNC: 1.47 UIU/ML (ref 0.45–4.5)

## 2022-08-01 PROCEDURE — 84439 ASSAY OF FREE THYROXINE: CPT

## 2022-08-01 PROCEDURE — 80061 LIPID PANEL: CPT

## 2022-08-01 PROCEDURE — 84443 ASSAY THYROID STIM HORMONE: CPT

## 2022-08-01 PROCEDURE — 84481 FREE ASSAY (FT-3): CPT

## 2022-08-01 PROCEDURE — 36415 COLL VENOUS BLD VENIPUNCTURE: CPT

## 2022-08-04 ENCOUNTER — OFFICE VISIT (OUTPATIENT)
Dept: BARIATRICS | Facility: CLINIC | Age: 52
End: 2022-08-04

## 2022-08-04 DIAGNOSIS — R63.5 ABNORMAL WEIGHT GAIN: Primary | ICD-10-CM

## 2022-08-04 PROCEDURE — RECHECK: Performed by: DIETITIAN, REGISTERED

## 2022-08-04 NOTE — PROGRESS NOTES
Patient contacted vis phone for VLCD f/u as requested by patient  Contacted patient to follow-up on tolerance of VLCD Diet  Patient states they are doing well  Tolerating meal replacements without difficulty  Consuming at least 80oz of water in addition to water used to mix replacements  Patient not experiencing constipation  Patient stated that she dislikes some of the supplements she ordered initially- asked patient with purchase of 1 box of chicken soup meal replacement and provieded samples of natural products for patient to sample  Patient also will be going to [facility] on Monday  Reviewed options for lunch (use meal replacement vs emergency meal plan)  Will follow up in 2 weeks

## 2022-08-09 DIAGNOSIS — F98.8 ATTENTION DEFICIT DISORDER (ADD) WITHOUT HYPERACTIVITY: ICD-10-CM

## 2022-08-09 RX ORDER — DEXTROAMPHETAMINE SACCHARATE, AMPHETAMINE ASPARTATE MONOHYDRATE, DEXTROAMPHETAMINE SULFATE AND AMPHETAMINE SULFATE 7.5; 7.5; 7.5; 7.5 MG/1; MG/1; MG/1; MG/1
30 CAPSULE, EXTENDED RELEASE ORAL DAILY
Qty: 30 CAPSULE | Refills: 0 | Status: SHIPPED | OUTPATIENT
Start: 2022-08-09 | End: 2022-09-12 | Stop reason: SDUPTHER

## 2022-08-12 NOTE — PROGRESS NOTES
Weight Management Medical Nutrition Assessment   Is here for VLCD f/u  Current wt: 259 1 lbs  Loss of 14 3 lbs x 2 weeks  Reports some constipation, moving bowels every 3 days with some straining  Hydration adequate  Likely needs more fiber in her diet  She will incorporate 2 fiber gummies as well as fiber gelatin  Addressed physical activity  Using snacks as needed  Emergency meal plan x 1  Labs ordered  Will continue VLCD at this time  Patient seen by Medical Provider in past 6 months:  yes  Requested to schedule appointment with Medical Provider: No    Anthropometric Measurements  Start Weight (#): 273 4 lbs 6/29/22 (did not start VLCD until 8/1/22)  Current Weight (#): 259 1 lbs   TBW % Change from start weight: 5 2%  Ideal Body Weight (#): 182 6 lbs BMI 25 (5'11 7" 157 5 lbs)  Goal Weight (#): 200 lbs  Highest:  Lowest:    Weight Loss History  Previous weight loss attempts: Commercial Programs ([employer], Rosa Owen, etc )  Exercise  Self Created Diets (Portion Control, Healthy Food Choices, etc )    Food and Nutrition Related History  Wake up:    Bed Time: 10    Food Recall  Breakfast: 9 replacement   Snack:   Lunch: 1 replacement  Snack: bar varies   Dinner: 6-7 replacement   Snack:    Beverages: water  Volume of beverage intake: 80 oz+    Weekends: Same  Cravings: none identified   Trouble area of day: not identified     Frequency of Eating out: none currently  Food restrictions: carbs <50 gm while on VLCD  Cooking: self   Food Shopping: self    Physical Activity Intake  Activity:n/a  Frequency:none currently  Physical limitations/barriers to exercise: no intense exercise while on VLCD    Estimated Needs  Energy  [employer] Needs:  BMR : Lety Lawton   1-2# loss weekly sedentary: 6332-9733             1-2# loss weekly lightly active: 5836-9524  Maintenance calories for sedentary activity level: 2265  Protein:  gm      (1 2-1 5g/kg IBW)  Fluid:  84 oz      (35mL/kg IBW)    Nutrition Diagnosis  Yes; Overweight/obesity  related to Excess energy intake as evidenced by  BMI more than normative standard for age and sex (obesity-grade II 35-39  9)       Nutrition Intervention    Nutrition Prescription  Calories: 760  Protein: 96 gm  Fluid: 80 oz    Meal Plan (Rick/Pro/Carb)  Breakfast: 200, 27, 10  Snack:  Lunch: 200, 27, 10  Snack: 160, 15, 13 net  Dinner: 200, 27, 10  Snack:    Nutrition Education:    VLCD  Physical activity     Nutrition Counseling:  Strategies: as above    Monitoring and Evaluation:  Evaluation criteria:  Energy Intake  Meet protein needs  Maintain adequate hydration  Monitor weekly weight  Physical activity     Barriers to learning:none  Readiness to change: Action:  (Changing behavior)  Comprehension: very good  Expected Compliance: very good

## 2022-08-15 ENCOUNTER — OFFICE VISIT (OUTPATIENT)
Dept: BARIATRICS | Facility: CLINIC | Age: 52
End: 2022-08-15

## 2022-08-15 VITALS
HEIGHT: 72 IN | WEIGHT: 259.1 LBS | BODY MASS INDEX: 35.1 KG/M2 | SYSTOLIC BLOOD PRESSURE: 134 MMHG | DIASTOLIC BLOOD PRESSURE: 70 MMHG

## 2022-08-15 DIAGNOSIS — R63.5 ABNORMAL WEIGHT GAIN: Primary | ICD-10-CM

## 2022-08-15 PROCEDURE — RECHECK

## 2022-08-15 PROCEDURE — VLCD

## 2022-08-29 ENCOUNTER — LAB (OUTPATIENT)
Dept: LAB | Facility: HOSPITAL | Age: 52
End: 2022-08-29
Payer: COMMERCIAL

## 2022-08-29 DIAGNOSIS — R63.5 ABNORMAL WEIGHT GAIN: ICD-10-CM

## 2022-08-29 DIAGNOSIS — E83.110 HEREDITARY HEMOCHROMATOSIS (HCC): ICD-10-CM

## 2022-08-29 DIAGNOSIS — E66.9 OBESITY, CLASS II, BMI 35-39.9: ICD-10-CM

## 2022-08-29 LAB
ALBUMIN SERPL BCP-MCNC: 3.8 G/DL (ref 3.5–5)
ALP SERPL-CCNC: 109 U/L (ref 46–116)
ALT SERPL W P-5'-P-CCNC: 31 U/L (ref 12–78)
ANION GAP SERPL CALCULATED.3IONS-SCNC: 4 MMOL/L (ref 4–13)
AST SERPL W P-5'-P-CCNC: 22 U/L (ref 5–45)
BILIRUB SERPL-MCNC: 1.21 MG/DL (ref 0.2–1)
BUN SERPL-MCNC: 16 MG/DL (ref 5–25)
CALCIUM SERPL-MCNC: 9.5 MG/DL (ref 8.3–10.1)
CHLORIDE SERPL-SCNC: 108 MMOL/L (ref 96–108)
CO2 SERPL-SCNC: 26 MMOL/L (ref 21–32)
CREAT SERPL-MCNC: 0.78 MG/DL (ref 0.6–1.3)
FERRITIN SERPL-MCNC: 32 NG/ML (ref 8–388)
GFR SERPL CREATININE-BSD FRML MDRD: 87 ML/MIN/1.73SQ M
GLUCOSE P FAST SERPL-MCNC: 86 MG/DL (ref 65–99)
IRON SATN MFR SERPL: 43 % (ref 15–50)
IRON SERPL-MCNC: 117 UG/DL (ref 50–170)
MAGNESIUM SERPL-MCNC: 2.2 MG/DL (ref 1.6–2.6)
POTASSIUM SERPL-SCNC: 3.9 MMOL/L (ref 3.5–5.3)
PROT SERPL-MCNC: 7.9 G/DL (ref 6.4–8.4)
SODIUM SERPL-SCNC: 138 MMOL/L (ref 135–147)
TIBC SERPL-MCNC: 274 UG/DL (ref 250–450)

## 2022-08-29 PROCEDURE — 83735 ASSAY OF MAGNESIUM: CPT

## 2022-08-29 PROCEDURE — 83550 IRON BINDING TEST: CPT

## 2022-08-29 PROCEDURE — 80053 COMPREHEN METABOLIC PANEL: CPT

## 2022-08-29 PROCEDURE — 36415 COLL VENOUS BLD VENIPUNCTURE: CPT

## 2022-08-29 PROCEDURE — 82728 ASSAY OF FERRITIN: CPT

## 2022-08-29 PROCEDURE — 83540 ASSAY OF IRON: CPT

## 2022-08-30 ENCOUNTER — OFFICE VISIT (OUTPATIENT)
Dept: BARIATRICS | Facility: CLINIC | Age: 52
End: 2022-08-30

## 2022-08-30 ENCOUNTER — TELEPHONE (OUTPATIENT)
Dept: BARIATRICS | Facility: CLINIC | Age: 52
End: 2022-08-30

## 2022-08-30 DIAGNOSIS — R63.5 ABNORMAL WEIGHT GAIN: Primary | ICD-10-CM

## 2022-08-30 PROCEDURE — RECHECK: Performed by: DIETITIAN, REGISTERED

## 2022-08-30 PROCEDURE — VLCD: Performed by: DIETITIAN, REGISTERED

## 2022-08-30 NOTE — TELEPHONE ENCOUNTER
----- Message from Clayton Rodriguez sent at 8/30/2022 11:42 AM EDT -----  Please let the patient know I have reviewed her iron studies and ferritin and they were all within acceptable range to continue VLCD   ThanksSusy

## 2022-08-30 NOTE — PROGRESS NOTES
Weight Management Medical Nutrition Assessment   Is here for VLCD f/u  Current wt: 252 1 lbs  Loss of 7 lbs x 2 weeks  Overall 21 3#  Avg 5  3# weight loss per week  Continues to use fiber and walking to help her BM to be regular  Started walking 3 days per week, using snacks appropriately (2)  Emergency meal plan x 1  Labs OK per PA   Will continue VLCD at this time  Patient seen by Medical Provider in past 6 months:  yes  Requested to schedule appointment with Medical Provider: No    Anthropometric Measurements  Start Weight (#): 273 4 lbs 6/29/22 (did not start VLCD until 8/1/22, pt reports weight was stable form 6/29/22)  Current Weight (#): 259 1 lbs   TBW % Change from start weight: 7%  Ideal Body Weight (#): 182 6 lbs BMI 25 (5'11 7" 157 5 lbs)  Goal Weight (#): 200 lbs  Highest:  Lowest:    Weight Loss History  Previous weight loss attempts: Commercial Programs ([employer], Zachariah England, etc )  Exercise  Self Created Diets (Portion Control, Healthy Food Choices, etc )    Food and Nutrition Related History  Wake up:    Bed Time: 10    Food Recall  Breakfast: 9 replacement   Snack:   Lunch: 1 replacement  Snack: bar varies   Dinner: 6-7 replacement   Snack:  - when walks uses an egg and string cheese    Beverages: water  Volume of beverage intake: 80 oz+    Weekends: Same  Cravings: none identified   Trouble area of day: not identified     Frequency of Eating out: none currently  Food restrictions: carbs <50 gm while on VLCD  Cooking: self   Food Shopping: self    Physical Activity Intake  Activity:walking M-W-F 30 minutes each time  Frequency:none currently  Physical limitations/barriers to exercise: no intense exercise while on VLCD    Estimated Needs  Energy  [employer] Energy Needs:  BMR : Michael Hendrickson   1-2# loss weekly sedentary: 0341-3010             1-2# loss weekly lightly active: 4931-4403  Maintenance calories for sedentary activity level: 2265  Protein:  gm      (1 2-1 5g/kg IBW)  Fluid: 84 oz      (35mL/kg IBW)    Nutrition Diagnosis  Yes; Overweight/obesity  related to Excess energy intake as evidenced by  BMI more than normative standard for age and sex (obesity-grade II 35-39  9)       Nutrition Intervention    Nutrition Prescription  Calories: 760  Protein: 96 gm  Fluid: 80 oz    Meal Plan (Rick/Pro/Carb)  Breakfast: 200, 27, 10  Snack:  Lunch: 200, 27, 10  Snack: 160, 15, 13 net  Dinner: 200, 27, 10  Snack:  + 2 low carb snack on days she walks    Nutrition Education:    VLCD  Physical activity     Nutrition Counseling:  Strategies: as above    Monitoring and Evaluation:  Evaluation criteria:  Energy Intake  Meet protein needs  Maintain adequate hydration  Monitor weekly weight  Physical activity     Barriers to learning:none  Readiness to change: Action:  (Changing behavior)  Comprehension: very good  Expected Compliance: very good

## 2022-09-12 ENCOUNTER — OFFICE VISIT (OUTPATIENT)
Dept: BARIATRICS | Facility: CLINIC | Age: 52
End: 2022-09-12
Payer: COMMERCIAL

## 2022-09-12 VITALS
HEART RATE: 75 BPM | SYSTOLIC BLOOD PRESSURE: 122 MMHG | BODY MASS INDEX: 33.4 KG/M2 | WEIGHT: 246.6 LBS | RESPIRATION RATE: 16 BRPM | DIASTOLIC BLOOD PRESSURE: 80 MMHG | HEIGHT: 72 IN | OXYGEN SATURATION: 99 %

## 2022-09-12 DIAGNOSIS — G47.33 OSA (OBSTRUCTIVE SLEEP APNEA): ICD-10-CM

## 2022-09-12 DIAGNOSIS — E66.9 OBESITY, CLASS I, BMI 30-34.9: Primary | ICD-10-CM

## 2022-09-12 DIAGNOSIS — Z78.9 KETOGENIC DIET: ICD-10-CM

## 2022-09-12 DIAGNOSIS — F98.8 ATTENTION DEFICIT DISORDER (ADD) WITHOUT HYPERACTIVITY: ICD-10-CM

## 2022-09-12 PROBLEM — E66.811 OBESITY, CLASS I, BMI 30-34.9: Status: ACTIVE | Noted: 2022-09-12

## 2022-09-12 PROCEDURE — 99213 OFFICE O/P EST LOW 20 MIN: CPT | Performed by: PHYSICIAN ASSISTANT

## 2022-09-12 RX ORDER — DEXTROAMPHETAMINE SACCHARATE, AMPHETAMINE ASPARTATE MONOHYDRATE, DEXTROAMPHETAMINE SULFATE AND AMPHETAMINE SULFATE 7.5; 7.5; 7.5; 7.5 MG/1; MG/1; MG/1; MG/1
30 CAPSULE, EXTENDED RELEASE ORAL DAILY
Qty: 30 CAPSULE | Refills: 0 | Status: SHIPPED | OUTPATIENT
Start: 2022-09-12 | End: 2022-10-10 | Stop reason: SDUPTHER

## 2022-09-12 NOTE — ASSESSMENT & PLAN NOTE
-Patient is pursuing very low calorie diet  -4 46 lb a week weight loss average  -Initial weight loss goal of 5-10% weight loss for improved health  -Labs 8/29/22 bilirubin with slight elevation  Total Bilirubin 0 20 - 1 00 mg/dL 1 21 High    Labs ordered for recheck CMP and magnesium    Initial:273 4  Current:246 6  Change:-26 8 total  Goal:under 200    Continue very low calorie diet, continue 80 oz water daily, continue SF fiber supplement   Continue walking 30 minutes 3 times a week and using keto snack if necessary

## 2022-09-12 NOTE — PROGRESS NOTES
Assessment/Plan:    Obesity, Class I, BMI 30-34 9  -Patient is pursuing very low calorie diet  -4 46 lb a week weight loss average  -Initial weight loss goal of 5-10% weight loss for improved health  -Labs 8/29/22 bilirubin with slight elevation  Total Bilirubin 0 20 - 1 00 mg/dL 1 21 High    Labs ordered for recheck CMP and magnesium    Initial:273 4  Current:246 6  Change:-26 8 total  Goal:under 200    Continue very low calorie diet, continue 80 oz water daily, continue SF fiber supplement  Continue walking 30 minutes 3 times a week and using keto snack if necessary          Follow up in approximately 2 weeks with Non-Surgical Dietician  Diagnoses and all orders for this visit:    Obesity, Class I, BMI 30-34 9  -     Comprehensive metabolic panel; Future  -     Magnesium; Future    HUMPHREY (obstructive sleep apnea)  -     Comprehensive metabolic panel; Future  -     Magnesium; Future    Ketogenic diet  -     Comprehensive metabolic panel; Future  -     Magnesium; Future          Subjective:   Chief Complaint   Patient presents with    Follow-up     MWM  6 wk fu VLCD        Patient ID: Darlys Mortimer  is a 47 y o  female with excess weight/obesity here to pursue weight managment  Patient is pursuing Very Low Calorie Diet-VLCD  HPI  She is here for 6 week VLCD follow up  She had mild constipation but is taking fiber gummy and it is helping   Physical appetite is controlled but she does feel the urge to eat at times    Wt Readings from Last 10 Encounters:   09/12/22 112 kg (246 lb 9 6 oz)   08/15/22 118 kg (259 lb 1 6 oz)   06/27/22 123 kg (272 lb)   06/20/22 124 kg (273 lb 3 2 oz)   05/20/22 123 kg (270 lb 14 4 oz)   05/09/22 123 kg (272 lb)   01/18/22 120 kg (265 lb)   12/07/21 120 kg (265 lb)   09/17/21 118 kg (260 lb)   09/14/21 118 kg (260 lb)       Food logging: no, doing 3 packs, 1 bar  Increased appetite/cravings:  Fruit/Vegetable servings:  Exercise:walking 30 minutes 3 times a week  Hydration:80 ounces of water at least    Colonoscopy-Completed    The following portions of the patient's history were reviewed and updated as appropriate:   She  has a past medical history of Class 2 severe obesity due to excess calories with serious comorbidity and body mass index (BMI) of 35 0 to 35 9 in [facility]), Hemochromatosis, [facility]reditary hemochromatosis ([facility] ) (4/5/2018), and IBS (irritable bowel syndrome)  She   Patient Active Problem List    Diagnosis Date Noted    Obesity, Class I, BMI 30-34 9 09/12/2022    HUMPHREY (obstructive sleep apnea)     Hereditary hemochromatosis ([facility] ) 04/05/2018    ADD (attention deficit disorder) 05/07/2012    Allergic rhinitis 05/07/2012     She  has a past surgical history that includes Colonoscopy; Hysteroscopy w/ endometrial ablation; Nasal septum surgery; pr imelda hallux valgus w/sesmdc w/1metar medial cnf (Left, 03/19/2021); and Bunionectomy (Left, 03/2021)  Her family history includes Arrhythmia in her mother; Heart attack in her maternal aunt, maternal grandfather, and paternal grandfather; Heart disease in her father and maternal aunt; Hypertension in her mother; No Known Problems in her daughter, daughter, maternal grandmother, paternal aunt, paternal aunt, paternal grandmother, and sister; Uterine cancer (age of onset: 61) in her mother  She  reports that she quit smoking about 19 years ago  She has a 1 00 pack-year smoking history  She has never used smokeless tobacco  She reports current alcohol use  She reports that she does not use drugs    Current Outpatient Medications   Medication Sig Dispense Refill    amphetamine-dextroamphetamine (ADDERALL XR) 30 MG 24 hr capsule Take 1 capsule (30 mg total) by mouth daily Max Daily Amount: 30 mg 30 capsule 0    cetirizine (ZyrTEC) 10 mg tablet Take 10 mg by mouth daily      fluticasone (FLONASE) 50 mcg/act nasal spray 1 spray into each nostril daily      Probiotic Product (PROBIOTIC DAILY PO) Take by mouth      triamcinolone (KENALOG) 0 1 % cream Apply topically 2 (two) times a day 30 g 0    Cholecalciferol (VITAMIN D) 2000 units CAPS Take 1 capsule by mouth daily  (Patient not taking: Reported on 9/12/2022)      Multiple Vitamin (multivitamin) tablet Take 1 tablet by mouth daily (Patient not taking: Reported on 9/12/2022)       No current facility-administered medications for this visit  Current Outpatient Medications on File Prior to Visit   Medication Sig    cetirizine (ZyrTEC) 10 mg tablet Take 10 mg by mouth daily    fluticasone (FLONASE) 50 mcg/act nasal spray 1 spray into each nostril daily    Probiotic Product (PROBIOTIC DAILY PO) Take by mouth    triamcinolone (KENALOG) 0 1 % cream Apply topically 2 (two) times a day    Cholecalciferol (VITAMIN D) 2000 units CAPS Take 1 capsule by mouth daily  (Patient not taking: Reported on 9/12/2022)    Multiple Vitamin (multivitamin) tablet Take 1 tablet by mouth daily (Patient not taking: Reported on 9/12/2022)    [DISCONTINUED] amphetamine-dextroamphetamine (ADDERALL XR) 30 MG 24 hr capsule Take 1 capsule (30 mg total) by mouth daily Max Daily Amount: 30 mg     No current facility-administered medications on file prior to visit  She has No Known Allergies       Review of Systems   Constitutional: Negative for chills and fever  Respiratory: Negative for shortness of breath  Cardiovascular: Negative for chest pain and palpitations  Gastrointestinal: Positive for constipation  Negative for abdominal pain, diarrhea and vomiting  Genitourinary: Negative for difficulty urinating  Musculoskeletal: Negative for arthralgias and back pain  Skin: Negative for rash  Neurological: Negative for headaches  Psychiatric/Behavioral: Negative for dysphoric mood  The patient is not nervous/anxious          Objective:    /80 (BP Location: Right arm, Patient Position: Sitting, Cuff Size: Standard)   Pulse 75   Resp 16   Ht 5' 11 7" (1 821 m)   Wt 112 kg (246 lb 9 6 oz) SpO2 99%   BMI 33 73 kg/m²      Physical Exam  Vitals and nursing note reviewed  Constitutional:       General: She is not in acute distress  Appearance: She is well-developed  She is obese  HENT:      Head: Normocephalic and atraumatic  Eyes:      Conjunctiva/sclera: Conjunctivae normal    Neck:      Thyroid: No thyromegaly  Pulmonary:      Effort: Pulmonary effort is normal  No respiratory distress  Skin:     Findings: No rash (visible)  Neurological:      Mental Status: She is alert and oriented to person, place, and time     Psychiatric:         Mood and Affect: Mood normal          Behavior: Behavior normal

## 2022-09-19 NOTE — PROGRESS NOTES
Weight Management Medical Nutrition Assessment   Is here for VLCD f/u  Current wt: 244 6  lbs  Loss of 2  lbs x 2 weeks with overall 28 8 lbs x 8 wks (avg 3 6 lbs/wk)  Fiber gummies 2 per day, colace  Moving bowels at least every 3 days but does feel contispated  Hydration adequate  Walking every morning 2 1/2 miles in 30 minutes and using snacks on those days  Discussed that slower loss this last 2 weeks may be due to constipation vs normal slow down vs too much activity  Suggest that she do less exercise or could try having additional snack  Discussed future transition  Questions answered re: total carbs vs net carbs        Patient seen by Medical Provider in past 6 months:  yes  Requested to schedule appointment with Medical Provider: No    Anthropometric Measurements  Start Weight (#): 273 4 lbs 6/29/22 (did not start VLCD until 8/1/22, pt reports weight was stable from 6/29/22)  Current Weight (#): 244 6 lbs   TBW % Change from start weight: 10 5%  Ideal Body Weight (#): 182 6 lbs BMI 25 (5'11 7" 157 5 lbs)  Goal Weight (#): 200 lbs  Highest:  Lowest:    Weight Loss History  Previous weight loss attempts: Commercial Programs ([employer], [employer], etc )  Exercise  Self Created Diets (Portion Control, Healthy Food Choices, etc )    Food and Nutrition Related History  Wake up:    Bed Time: 10    Food Recall  Breakfast: 9 replacement   Snack:   Lunch: 1 replacement  Snack: bar varies   Dinner: 6-7 replacement   Snack:  - when walks uses an egg and string cheese    Beverages: water  Volume of beverage intake: 80 oz+    Weekends: Same  Cravings: none identified   Trouble area of day: not identified     Frequency of Eating out: none currently  Food restrictions: carbs <50 gm while on VLCD  Cooking: self   Food Shopping: self    Physical Activity Intake  Activity:walking M-F 2 5 miles/30 minutes   Frequency:several times per week  Physical limitations/barriers to exercise: no intense exercise while on VLCD    Estimated Needs  Energy  [employer] Energy Needs: BMR : Tasneem Alvarado   1-2# loss weekly sedentary: 3589-1005             1-2# loss weekly lightly active: 8014-8629  Maintenance calories for sedentary activity level: 2265  Protein:  gm      (1 2-1 5g/kg IBW)  Fluid:  84 oz      (35mL/kg IBW)    Nutrition Diagnosis  Yes; Overweight/obesity  related to Excess energy intake as evidenced by  BMI more than normative standard for age and sex (obesity-grade I 26-30  9)       Nutrition Intervention    Nutrition Prescription  Calories: 760  Protein: 96 gm  Fluid: 80 oz    Meal Plan (Rick/Pro/Carb)  Breakfast: 200, 27, 10  Snack:  Lunch: 200, 27, 10  Snack: 160, 15, 13 net  Dinner: 200, 27, 10  Snack:  + 2 low carb snack on days she walks    Nutrition Education:    VLCD  Physical activity     Nutrition Counseling:  Strategies: as above    Monitoring and Evaluation:  Evaluation criteria:  Energy Intake  Meet protein needs  Maintain adequate hydration  Monitor weekly weight  Physical activity     Barriers to learning:none  Readiness to change: Action:  (Changing behavior)  Comprehension: very good  Expected Compliance: very good

## 2022-09-23 ENCOUNTER — APPOINTMENT (OUTPATIENT)
Dept: LAB | Facility: HOSPITAL | Age: 52
End: 2022-09-23
Payer: COMMERCIAL

## 2022-09-23 DIAGNOSIS — E66.9 OBESITY, CLASS I, BMI 30-34.9: ICD-10-CM

## 2022-09-23 DIAGNOSIS — G47.33 OSA (OBSTRUCTIVE SLEEP APNEA): ICD-10-CM

## 2022-09-23 DIAGNOSIS — Z78.9 KETOGENIC DIET: ICD-10-CM

## 2022-09-23 LAB
ALBUMIN SERPL BCP-MCNC: 3.9 G/DL (ref 3.5–5)
ALP SERPL-CCNC: 113 U/L (ref 46–116)
ALT SERPL W P-5'-P-CCNC: 32 U/L (ref 12–78)
ANION GAP SERPL CALCULATED.3IONS-SCNC: 4 MMOL/L (ref 4–13)
AST SERPL W P-5'-P-CCNC: 20 U/L (ref 5–45)
BILIRUB SERPL-MCNC: 1.43 MG/DL (ref 0.2–1)
BUN SERPL-MCNC: 17 MG/DL (ref 5–25)
CALCIUM SERPL-MCNC: 9.8 MG/DL (ref 8.3–10.1)
CHLORIDE SERPL-SCNC: 105 MMOL/L (ref 96–108)
CO2 SERPL-SCNC: 27 MMOL/L (ref 21–32)
CREAT SERPL-MCNC: 0.82 MG/DL (ref 0.6–1.3)
GFR SERPL CREATININE-BSD FRML MDRD: 81 ML/MIN/1.73SQ M
GLUCOSE P FAST SERPL-MCNC: 94 MG/DL (ref 65–99)
MAGNESIUM SERPL-MCNC: 2.1 MG/DL (ref 1.6–2.6)
POTASSIUM SERPL-SCNC: 4 MMOL/L (ref 3.5–5.3)
PROT SERPL-MCNC: 7.9 G/DL (ref 6.4–8.4)
SODIUM SERPL-SCNC: 136 MMOL/L (ref 135–147)

## 2022-09-23 PROCEDURE — 80053 COMPREHEN METABOLIC PANEL: CPT

## 2022-09-23 PROCEDURE — 83735 ASSAY OF MAGNESIUM: CPT

## 2022-09-23 PROCEDURE — 36415 COLL VENOUS BLD VENIPUNCTURE: CPT

## 2022-09-26 ENCOUNTER — OFFICE VISIT (OUTPATIENT)
Dept: BARIATRICS | Facility: CLINIC | Age: 52
End: 2022-09-26

## 2022-09-26 VITALS
SYSTOLIC BLOOD PRESSURE: 121 MMHG | WEIGHT: 244.6 LBS | BODY MASS INDEX: 33.13 KG/M2 | HEIGHT: 72 IN | DIASTOLIC BLOOD PRESSURE: 82 MMHG

## 2022-09-26 DIAGNOSIS — R63.5 ABNORMAL WEIGHT GAIN: ICD-10-CM

## 2022-09-26 PROCEDURE — RECHECK

## 2022-09-26 PROCEDURE — VLCD

## 2022-10-04 NOTE — PROGRESS NOTES
Weight Management Medical Nutrition Assessment   Is here for VLCD f/u  Current wt:  237 1  lbs  Loss of 7 5  lbs x 2 weeks with overall loss of 36 3  lbs x 10 wks (avg  3 6 lbs/wk)  Using 2 fiber gummies and colace which helped constipation  Also increased her snacks on her exercise days  Hydration adequate  She will continue final 2 wks of VLCD and then transition to a partial replacement program  Meal plan provided for transition  Options for f/u reviewed  She will f/u 3 wks after seeing PA  Patient seen by Medical Provider in past 6 months:  yes  Requested to schedule appointment with Medical Provider: No    Anthropometric Measurements  Start Weight (#): 273 4 lbs 6/29/22 (did not start VLCD until 8/1/22, pt reports weight was stable from 6/29/22)  Current Weight (#): 237 1 lbs   TBW % Change from start weight: 13 3%  Ideal Body Weight (#): 182 6 lbs BMI 25 (5'11 7" 157 5 lbs)  Goal Weight (#): 200 lbs  Highest:  Lowest:    Weight Loss History  Previous weight loss attempts: Commercial Programs (Weight [employer], [employer], etc )  Exercise  Self Created Diets (Portion Control, Healthy Food Choices, etc )    Food and Nutrition Related History  Wake up: 4:30-5:30   Bed Time: 10    Food Recall  Breakfast: 9 replacement   Snack:   Lunch: 1 replacement  Snack: 3-4:00 bar varies   Dinner: 6-7 replacement   Snack:  - when walks uses an egg and string cheese    Beverages: water  Volume of beverage intake: 80 oz+    Weekends: Same  Cravings: none identified   Trouble area of day: not identified     Frequency of Eating out: none currently  Food restrictions: carbs <50 gm while on VLCD  Cooking: self   Food Shopping: self    Physical Activity Intake  Activity:walking M-F 2 5 miles/30 minutes   Frequency:several times per week  Physical limitations/barriers to exercise: no intense exercise while on VLCD    Estimated Needs  Energy  [employer] Energy Needs:  BMR : Ryder Lopez   1-2# loss weekly sedentary: 1760-3890 1-2# loss weekly lightly active: 7461-6236  Maintenance calories for sedentary activity level: 2145  Protein:  gm      (1 2-1 5g/kg IBW)  Fluid:  84 oz      (35mL/kg IBW)    Nutrition Diagnosis  Yes; Overweight/obesity  related to Excess energy intake as evidenced by  BMI more than normative standard for age and sex (obesity-grade I 26-30  9)       Nutrition Intervention    Nutrition Prescription  Calories: 760  Protein: 96 gm  Fluid: 80 oz    Meal Plan (Rick/Pro/Carb)  Breakfast: 200, 27, 10  Snack:  Lunch: 200, 27, 10  Snack: 160, 15, 13 net  Dinner: 200, 27, 10  Snack:  + 2 low carb snack on days she walks    Meal plan for after transition  4990-0242   105-115 gm protein  75-95 gm carb    B: 100-150, 5-10, 10-20  S: 200, 27, 10  L: 200, 27, 10  S: 160, 15, 18  D: 350-425, 30-35, 25-35    Nutrition Education:    VLCD  Physical activity     Nutrition Counseling:  Strategies: as above    Monitoring and Evaluation:  Evaluation criteria:  Energy Intake  Meet protein needs  Maintain adequate hydration  Monitor weekly weight  Physical activity   Calorie controlled meal plan  Healthy snacks    Barriers to learning:none  Readiness to change: Action:  (Changing behavior)  Comprehension: very good  Expected Compliance: very good

## 2022-10-10 ENCOUNTER — OFFICE VISIT (OUTPATIENT)
Dept: BARIATRICS | Facility: CLINIC | Age: 52
End: 2022-10-10

## 2022-10-10 VITALS — BODY MASS INDEX: 32.12 KG/M2 | HEIGHT: 72 IN | WEIGHT: 237.1 LBS

## 2022-10-10 DIAGNOSIS — F98.8 ATTENTION DEFICIT DISORDER (ADD) WITHOUT HYPERACTIVITY: ICD-10-CM

## 2022-10-10 DIAGNOSIS — R63.5 ABNORMAL WEIGHT GAIN: ICD-10-CM

## 2022-10-10 PROCEDURE — RECHECK

## 2022-10-10 PROCEDURE — VLCD

## 2022-10-10 RX ORDER — DEXTROAMPHETAMINE SACCHARATE, AMPHETAMINE ASPARTATE MONOHYDRATE, DEXTROAMPHETAMINE SULFATE AND AMPHETAMINE SULFATE 7.5; 7.5; 7.5; 7.5 MG/1; MG/1; MG/1; MG/1
30 CAPSULE, EXTENDED RELEASE ORAL DAILY
Qty: 30 CAPSULE | Refills: 0 | Status: SHIPPED | OUTPATIENT
Start: 2022-10-10

## 2022-10-24 ENCOUNTER — OFFICE VISIT (OUTPATIENT)
Dept: BARIATRICS | Facility: CLINIC | Age: 52
End: 2022-10-24
Payer: COMMERCIAL

## 2022-10-24 VITALS
WEIGHT: 233.1 LBS | RESPIRATION RATE: 16 BRPM | BODY MASS INDEX: 31.57 KG/M2 | DIASTOLIC BLOOD PRESSURE: 80 MMHG | HEIGHT: 72 IN | HEART RATE: 74 BPM | SYSTOLIC BLOOD PRESSURE: 122 MMHG

## 2022-10-24 DIAGNOSIS — E66.9 OBESITY, CLASS I, BMI 30-34.9: Primary | ICD-10-CM

## 2022-10-24 DIAGNOSIS — R17 ELEVATED BILIRUBIN: ICD-10-CM

## 2022-10-24 PROCEDURE — 99214 OFFICE O/P EST MOD 30 MIN: CPT | Performed by: PHYSICIAN ASSISTANT

## 2022-10-24 NOTE — PROGRESS NOTES
Assessment/Plan:    Obesity, Class I, BMI 30-34 9  -Patient is pursuing very low calorie diet  - 3 3 lb a week weight loss   -Initial weight loss goal of 5-10% weight loss for improved health  -Labs 9/23/22 bilirubin elevated at 1 43-recheck ordered for recheck    Initial:273 4  Current: 233 1  Change:-40 3 total  Goal:under 200    Will be transitioning for VLCD to parital meal replacement plan with 2 meal replacements, 1-2 snacks and midnful low carb dinner  continue 80 oz water daily  Continue walking 30 minutes 3 times a week and discussed starting weight training once a week          Follow up in approximately 3 weeks  with Non-Surgical Dietician  Diagnoses and all orders for this visit:    Obesity, Class I, BMI 30-34 9  -     Comprehensive metabolic panel; Future    Elevated bilirubin  -     Comprehensive metabolic panel; Future          Subjective:   Chief Complaint   Patient presents with   • Follow-up     MWM 3mth f/u; waist 40in        Patient ID: Jayne Bhatt  is a 47 y o  female with excess weight/obesity here to pursue weight managment  Patient is pursuing Very Low Calorie Diet-VLCD  HPI Here for 12 week follow up from VLCD  Overall lost 40 pounds  She had some constipation but is doing 1 meal replacement with fiber and it has improved    She denies any adverse effects  Wt Readings from Last 10 Encounters:   10/24/22 106 kg (233 lb 1 6 oz)   10/10/22 108 kg (237 lb 1 6 oz)   09/26/22 111 kg (244 lb 9 6 oz)   09/12/22 112 kg (246 lb 9 6 oz)   08/15/22 118 kg (259 lb 1 6 oz)   06/27/22 123 kg (272 lb)   06/20/22 124 kg (273 lb 3 2 oz)   05/20/22 123 kg (270 lb 14 4 oz)   05/09/22 123 kg (272 lb)   01/18/22 120 kg (265 lb)       Food logging:no 3 meal replacements, 1 bar, sometimes 1 snack a day  Increased appetite/cravings:no  Fruit/Vegetable servings:  Exercise:m/w/f 30 minutes walking  Hydration: 80 oz or more of water,     Colonoscopy-Completed    The following portions of the patient's history were reviewed and updated as appropriate:   She  has a past medical history of Class 2 severe obesity due to excess calories with serious comorbidity and body mass index (BMI) of 35 0 to 35 9 in [facility]), Hemochromatosis, Hereditary hemochromatosis ([facility] ) (4/5/2018), and IBS (irritable bowel syndrome)  She   Patient Active Problem List    Diagnosis Date Noted   • Obesity, Class I, BMI 30-34 9 09/12/2022   • HUMPHREY (obstructive sleep apnea)    • [facility]reditary hemochromatosis ([facility] ) 04/05/2018   • ADD (attention deficit disorder) 05/07/2012   • Allergic rhinitis 05/07/2012     She  has a past surgical history that includes Colonoscopy; Hysteroscopy w/ endometrial ablation; Nasal septum surgery; pr corrj hallux valgus w/sesmdc w/1metar medial cnf (Left, 03/19/2021); and Bunionectomy (Left, 03/2021)  Her family history includes Arrhythmia in her mother; Heart attack in her maternal aunt, maternal grandfather, and paternal grandfather; Heart disease in her father and maternal aunt; Hypertension in her mother; No Known Problems in her daughter, daughter, maternal grandmother, paternal aunt, paternal aunt, paternal grandmother, and sister; Uterine cancer (age of onset: 61) in her mother  She  reports that she quit smoking about 19 years ago  She has a 1 00 pack-year smoking history  She has never used smokeless tobacco  She reports current alcohol use  She reports that she does not use drugs    Current Outpatient Medications   Medication Sig Dispense Refill   • amphetamine-dextroamphetamine (ADDERALL XR) 30 MG 24 hr capsule Take 1 capsule (30 mg total) by mouth daily Max Daily Amount: 30 mg 30 capsule 0   • cetirizine (ZyrTEC) 10 mg tablet Take 10 mg by mouth daily     • Cholecalciferol (VITAMIN D) 2000 units CAPS Take 1 capsule by mouth daily  (Patient not taking: Reported on 9/12/2022)     • fluticasone (FLONASE) 50 mcg/act nasal spray 1 spray into each nostril daily     • Multiple Vitamin (multivitamin) tablet Take 1 tablet by mouth daily (Patient not taking: Reported on 9/12/2022)     • Probiotic Product (PROBIOTIC DAILY PO) Take by mouth     • triamcinolone (KENALOG) 0 1 % cream Apply topically 2 (two) times a day 30 g 0     No current facility-administered medications for this visit  Current Outpatient Medications on File Prior to Visit   Medication Sig   • amphetamine-dextroamphetamine (ADDERALL XR) 30 MG 24 hr capsule Take 1 capsule (30 mg total) by mouth daily Max Daily Amount: 30 mg   • cetirizine (ZyrTEC) 10 mg tablet Take 10 mg by mouth daily   • Cholecalciferol (VITAMIN D) 2000 units CAPS Take 1 capsule by mouth daily  (Patient not taking: Reported on 9/12/2022)   • fluticasone (FLONASE) 50 mcg/act nasal spray 1 spray into each nostril daily   • Multiple Vitamin (multivitamin) tablet Take 1 tablet by mouth daily (Patient not taking: Reported on 9/12/2022)   • Probiotic Product (PROBIOTIC DAILY PO) Take by mouth   • triamcinolone (KENALOG) 0 1 % cream Apply topically 2 (two) times a day     No current facility-administered medications on file prior to visit  She has No Known Allergies       Review of Systems   Constitutional: Negative for fatigue and fever  Respiratory: Negative for shortness of breath  Cardiovascular: Negative for chest pain and palpitations  Gastrointestinal: Negative for abdominal pain, constipation, diarrhea and vomiting  Genitourinary: Negative for difficulty urinating  Skin: Negative for rash  Neurological: Negative for headaches  Psychiatric/Behavioral: Negative for dysphoric mood  The patient is not nervous/anxious  Objective:    /80   Pulse 74   Resp 16   Ht 5' 11 75" (1 822 m)   Wt 106 kg (233 lb 1 6 oz)   Breastfeeding No   BMI 31 83 kg/m²      Physical Exam  Vitals and nursing note reviewed  Constitutional:       General: She is not in acute distress  Appearance: She is well-developed  She is obese     HENT:      Head: Normocephalic and atraumatic  Eyes:      Conjunctiva/sclera: Conjunctivae normal    Neck:      Thyroid: No thyromegaly  Pulmonary:      Effort: Pulmonary effort is normal  No respiratory distress  Skin:     Findings: No rash (visible)  Neurological:      Mental Status: She is alert and oriented to person, place, and time     Psychiatric:         Mood and Affect: Mood normal          Behavior: Behavior normal

## 2022-10-24 NOTE — ASSESSMENT & PLAN NOTE
-Patient is pursuing very low calorie diet  - 3 3 lb a week weight loss   -Initial weight loss goal of 5-10% weight loss for improved health  -Labs 9/23/22 bilirubin elevated at 1 43-recheck ordered for recheck    Initial:273 4  Current: 233 1  Change:-40 3 total  Goal:under 200    Will be transitioning for VLCD to parital meal replacement plan with 2 meal replacements, 1-2 snacks and midnful low carb dinner  continue 80 oz water daily   Continue walking 30 minutes 3 times a week and discussed starting weight training once a week

## 2022-11-02 NOTE — PROGRESS NOTES
Hematology/Oncology Outpatient Follow- up Note  Stephanie Lawrence, 1970, [id number]  2022        Chief Complaint   Patient presents with   • Follow-up       HPI:  Jewell Treviño is a 44 yo female with hereditary hemochromatosis   She had a MRI completed on 2018 revealing liver iron concentration of 74 umol/g indicating slightly overload   Otherwise the MRI was normal  [facility]  Previous Hematologic/ Oncologic History:    Monthly Phlebotomy    Current Hematologic/ Oncologic Treatment:    Observation  Blood donation prn     ECO - Asymptomatic    Interval History:    The patient presents for routine follow up  She was last seen on 22  She has donated blood once since her last visit  She reports she did not have more frequent phlebotomies since she started a weight loss program with weight management and she was not consuming a lot of calories for a while  She was concerned about donating blood during this process  She has lost approximately 40 lb  She feels good  Most recent blood work completed on 22 reviewed  CBC shows mild erythrocytosis  Hemoglobin 15 9, hematocrit 49  Remainder of CBC and differential is normal  There is no evidence of iron overload  Her ferritin is 50, iron saturation 45%    She does have  hereditary hemochromatosis  I explained I would like her to get back on a phlebotomy schedule  At this point, she does not need weekly phlebotomy but she should at the very least be having phlebotomy once a month to every 2 months  Test Results:    Imaging: No results found      Labs:   Lab Results   Component Value Date    WBC 6 15 2022    HGB 15 9 (H) 2022    HCT 49 0 (H) 2022    MCV 98 2022     2022     Lab Results   Component Value Date     2014    K 4 7 2022     2022    CO2 27 2022    ANIONGAP 1 (L) 2014    BUN 19 2022 CREATININE 0 74 11/04/2022    GLUCOSE 89 06/04/2014    GLUF 99 11/04/2022    CALCIUM 9 6 11/04/2022    CORRECTEDCA 10 1 11/04/2022    AST 20 11/04/2022    ALT 32 11/04/2022    ALKPHOS 111 11/04/2022    PROT 6 9 06/04/2014    BILITOT 1 0 06/04/2014    EGFR 92 11/04/2022           Review of Systems   All other systems reviewed and are negative          Active Problems:   Patient Active Problem List   Diagnosis   • ADD (attention deficit disorder)   • Allergic rhinitis   • [facility]   • HUMPHREY (obstructive sleep apnea)   • Obesity, Class I, BMI 30-34 9       Past Medical History:   Past Medical History:   Diagnosis Date   • Class 2 severe obesity due to excess calories with serious comorbidity and body mass index (BMI) of 35 0 to 35 9 in [facility])    • Hemochromatosis    • Hereditary hemochromatosis ([facility] ) 4/5/2018   • IBS (irritable bowel syndrome)        Surgical History:   Past Surgical History:   Procedure Laterality Date   • BUNIONECTOMY Left 03/2021   • COLONOSCOPY      poor prep colonoscopy 2016   • HYSTEROSCOPY W/ ENDOMETRIAL ABLATION     • NASAL SEPTUM SURGERY     • NY CORRJ HALLUX VALGUS W/SESMDC W/1METAR MEDIAL CNF Left 03/19/2021    Procedure: BUNIONECTOMY LAPIPLASTY LEFT FOOT;  Surgeon: Odin Stapleton DPM;  Location[facility];  Service: Podiatry       Family History:    Family History   Problem Relation Age of Onset   • Hypertension Mother    • Uterine cancer Mother 61   • Arrhythmia Mother         has defibrillator   • Heart disease Father    • No Known Problems Sister    • Heart attack Maternal Aunt         76years old   • Heart disease Maternal Aunt    • No Known Problems Paternal Aunt    • No Known Problems Paternal Aunt    • No Known Problems Maternal Grandmother    • Heart attack Maternal Grandfather    • No Known Problems Paternal Grandmother    • Heart attack Paternal Grandfather    • No Known Problems Daughter    • No Known Problems Daughter    • Colon polyps Neg Hx    • Colon cancer Neg Hx    • Hyperlipidemia Neg Hx    • Thyroid disease Neg Hx    • Stroke Neg Hx        Cancer-related family history includes Uterine cancer (age of onset: 61) in her mother  There is no history of Colon cancer  Social History:   Social History     Socioeconomic History   • Marital status: /Civil Union     Spouse name: Not on file   • Number of children: Not on file   • Years of education: Not on file   • Highest education level: Not on file   Occupational History   • Not on file   Tobacco Use   • Smoking status: Former Smoker     Packs/day: 0 20     Years: 5 00     Pack years: 1 00     Quit date:      Years since quittin 8   • Smokeless tobacco: Never Used   Vaping Use   • Vaping Use: Never used   Substance and Sexual Activity   • Alcohol use:  Yes   • Drug use: No   • Sexual activity: Not on file   Other Topics Concern   • Not on file   Social History Narrative   • Not on file     Social Determinants of Health     Financial Resource Strain: Not on file   Food Insecurity: Not on file   Transportation Needs: Not on file   Physical Activity: Not on file   Stress: Not on file   Social Connections: Not on file   Intimate Partner Violence: Not on file   Housing Stability: Not on file       Current Medications:   Current Outpatient Medications   Medication Sig Dispense Refill   • amphetamine-dextroamphetamine (ADDERALL XR) 30 MG 24 hr capsule Take 1 capsule (30 mg total) by mouth daily Max Daily Amount: 30 mg 30 capsule 0   • cetirizine (ZyrTEC) 10 mg tablet Take 10 mg by mouth daily     • fluticasone (FLONASE) 50 mcg/act nasal spray 1 spray into each nostril daily     • Probiotic Product (PROBIOTIC DAILY PO) Take by mouth     • triamcinolone (KENALOG) 0 1 % cream Apply topically 2 (two) times a day 30 g 0   • Cholecalciferol (VITAMIN D) 2000 units CAPS Take 1 capsule by mouth daily  (Patient not taking: No sig reported)     • Multiple Vitamin (multivitamin) tablet Take 1 tablet by mouth daily (Patient not taking: No sig reported)       No current facility-administered medications for this visit  Allergies:   No Known Allergies    Physical Exam:  /80 (BP Location: Left arm, Patient Position: Sitting, Cuff Size: Adult)   Pulse 73   Temp (!) 97 4 °F (36 3 °C) (Temporal)   Resp 16   Ht 5' 11" (1 803 m)   Wt 107 kg (236 lb)   SpO2 99%   BMI 32 92 kg/m²   Body surface area is 2 26 meters squared  Wt Readings from Last 3 Encounters:   11/09/22 107 kg (236 lb)   10/24/22 106 kg (233 lb 1 6 oz)   10/10/22 108 kg (237 lb 1 6 oz)           Physical Exam  Constitutional:       General: She is not in acute distress  Appearance: Normal appearance  HENT:      Head: Normocephalic and atraumatic  Eyes:      General: No scleral icterus  Right eye: No discharge  Left eye: No discharge  Conjunctiva/sclera: Conjunctivae normal    Cardiovascular:      Rate and Rhythm: Normal rate and regular rhythm  Pulmonary:      Effort: Pulmonary effort is normal  No respiratory distress  Breath sounds: Normal breath sounds  Chest:   Breasts:      Right: No axillary adenopathy or supraclavicular adenopathy  Left: No axillary adenopathy or supraclavicular adenopathy  Abdominal:      General: Bowel sounds are normal  There is no distension  Palpations: Abdomen is soft  There is no mass  Tenderness: There is no abdominal tenderness  Musculoskeletal:         General: Normal range of motion  Lymphadenopathy:      Cervical: No cervical adenopathy  Upper Body:      Right upper body: No supraclavicular, axillary or pectoral adenopathy  Left upper body: No supraclavicular, axillary or pectoral adenopathy  Skin:     General: Skin is warm and dry  Neurological:      General: No focal deficit present  Mental Status: She is alert and oriented to person, place, and time     Psychiatric:         Mood and Affect: Mood normal          Behavior: Behavior normal          Assessment / Plan:    1  Hereditary hemochromatosis [facility])      The patient is a 46 yo female with hereditary hemochromatosis   She had a MRI completed on 05/17/2018 revealing liver iron concentration of 74 umol/g indicating slightly overload   Otherwise the MRI was normal  [facility]  Patient has not had any recent phlebotomy  She is following with weight management and was concerned about blood donation with severe calorie restriction  Most recent labs show mildly elevated H&H  Iron saturation 45%, ferritin 50  I did recommend she donate blood  She is in agreement  She will schedule this with [facility] blood bank  I have recommend she do more frequent blood donation given her history of HH  Patient is in agreement  She will schedule more frequent blood donation  I will see her back in 6 months with repeat blood work  She knows to call at any time with questions or concerns    Barriers to care: None  Patient able to self care  Portions of the record may have been created with voice recognition software  Occasional wrong word or "sound a like" substitutions may have occurred due to the inherent limitations of voice recognition software  Read the chart carefully and recognize, using context, where substitutions have occurred

## 2022-11-04 ENCOUNTER — APPOINTMENT (OUTPATIENT)
Dept: LAB | Facility: HOSPITAL | Age: 52
End: 2022-11-04
Attending: INTERNAL MEDICINE

## 2022-11-04 DIAGNOSIS — E83.110 HEREDITARY HEMOCHROMATOSIS (HCC): ICD-10-CM

## 2022-11-04 DIAGNOSIS — R17 ELEVATED BILIRUBIN: ICD-10-CM

## 2022-11-04 DIAGNOSIS — Z00.00 ROUTINE GENERAL MEDICAL EXAMINATION AT A HEALTH CARE FACILITY: ICD-10-CM

## 2022-11-04 DIAGNOSIS — E66.9 OBESITY, CLASS I, BMI 30-34.9: ICD-10-CM

## 2022-11-04 LAB
ALBUMIN SERPL BCP-MCNC: 3.4 G/DL (ref 3.5–5)
ALP SERPL-CCNC: 111 U/L (ref 46–116)
ALT SERPL W P-5'-P-CCNC: 32 U/L (ref 12–78)
ANION GAP SERPL CALCULATED.3IONS-SCNC: 4 MMOL/L (ref 4–13)
AST SERPL W P-5'-P-CCNC: 20 U/L (ref 5–45)
BASOPHILS # BLD AUTO: 0.01 THOUSANDS/ÂΜL (ref 0–0.1)
BASOPHILS NFR BLD AUTO: 0 % (ref 0–1)
BILIRUB SERPL-MCNC: 1.08 MG/DL (ref 0.2–1)
BUN SERPL-MCNC: 19 MG/DL (ref 5–25)
CALCIUM ALBUM COR SERPL-MCNC: 10.1 MG/DL (ref 8.3–10.1)
CALCIUM SERPL-MCNC: 9.6 MG/DL (ref 8.3–10.1)
CHLORIDE SERPL-SCNC: 107 MMOL/L (ref 96–108)
CO2 SERPL-SCNC: 27 MMOL/L (ref 21–32)
CREAT SERPL-MCNC: 0.74 MG/DL (ref 0.6–1.3)
EOSINOPHIL # BLD AUTO: 0.15 THOUSAND/ÂΜL (ref 0–0.61)
EOSINOPHIL NFR BLD AUTO: 2 % (ref 0–6)
ERYTHROCYTE [DISTWIDTH] IN BLOOD BY AUTOMATED COUNT: 12.1 % (ref 11.6–15.1)
FERRITIN SERPL-MCNC: 50 NG/ML (ref 8–388)
GFR SERPL CREATININE-BSD FRML MDRD: 92 ML/MIN/1.73SQ M
GLUCOSE P FAST SERPL-MCNC: 99 MG/DL (ref 65–99)
HCT VFR BLD AUTO: 49 % (ref 34.8–46.1)
HGB BLD-MCNC: 15.9 G/DL (ref 11.5–15.4)
IMM GRANULOCYTES # BLD AUTO: 0.01 THOUSAND/UL (ref 0–0.2)
IMM GRANULOCYTES NFR BLD AUTO: 0 % (ref 0–2)
IRON SATN MFR SERPL: 45 % (ref 15–50)
IRON SERPL-MCNC: 103 UG/DL (ref 50–170)
LYMPHOCYTES # BLD AUTO: 2.13 THOUSANDS/ÂΜL (ref 0.6–4.47)
LYMPHOCYTES NFR BLD AUTO: 35 % (ref 14–44)
MCH RBC QN AUTO: 31.9 PG (ref 26.8–34.3)
MCHC RBC AUTO-ENTMCNC: 32.4 G/DL (ref 31.4–37.4)
MCV RBC AUTO: 98 FL (ref 82–98)
MONOCYTES # BLD AUTO: 0.45 THOUSAND/ÂΜL (ref 0.17–1.22)
MONOCYTES NFR BLD AUTO: 7 % (ref 4–12)
NEUTROPHILS # BLD AUTO: 3.4 THOUSANDS/ÂΜL (ref 1.85–7.62)
NEUTS SEG NFR BLD AUTO: 56 % (ref 43–75)
NRBC BLD AUTO-RTO: 0 /100 WBCS
PLATELET # BLD AUTO: 187 THOUSANDS/UL (ref 149–390)
PMV BLD AUTO: 11.1 FL (ref 8.9–12.7)
POTASSIUM SERPL-SCNC: 4.7 MMOL/L (ref 3.5–5.3)
PROT SERPL-MCNC: 7.6 G/DL (ref 6.4–8.4)
RBC # BLD AUTO: 4.98 MILLION/UL (ref 3.81–5.12)
SODIUM SERPL-SCNC: 138 MMOL/L (ref 135–147)
TIBC SERPL-MCNC: 227 UG/DL (ref 250–450)
WBC # BLD AUTO: 6.15 THOUSAND/UL (ref 4.31–10.16)

## 2022-11-07 NOTE — PROGRESS NOTES
Weight Management Medical Nutrition Assessment   Is here for meal planning after transitioning from VLCD 3 wks ago  Current wt:  234   lbs  She has maintained her weight x 3 weeks with overall loss of  39 4  lbs x  4 1/2 months  After lunch until afternoon snack feeling hungry, unclear if this hunger or due to past routine  Suggest add serving of carb via fruit at lunch or could use small serving fruit or veggies between this time frame  Tracking with myfitness pal ~1250 calories per day  Has continued to walk 3x/wk and is looking for additional exercise for 2 more days  Recommend strength training on those days  Questions answered re: measured items in sauce  Upcoming holiday addressed  She will f/u in 3 wks        Patient seen by Medical Provider in past 6 months:  yes  Requested to schedule appointment with Medical Provider: No    Anthropometric Measurements  Start Weight (#): 273 4 lbs 6/29/22    Current Weight (#): 234 lbs   TBW % Change from start weight: 14 4%  Ideal Body Weight (#): 182 6 lbs BMI 25 (5'11 7" 157 5 lbs)  Goal Weight (#): 200 lbs  Highest:  Lowest:    Weight Loss History  Previous weight loss attempts: Commercial Programs ([employer], Traci Brandi, etc )  Exercise  Self Created Diets (Portion Control, Healthy Food Choices, etc )    Food and Nutrition Related History  Wake up: 4:30-5:30   Bed Time: 10    Food Recall  Snack: 7:00 Tristian's killer thin, 1 egg   Breakfast: 9 replacement   Lunch: 12:30-1 replacement  Snack: 2:00: blueberries  Snack: 3:30-4:00 bar     Dinner: 6-7:00: 5-6 oz chicken/pork/hamburger, veggies, 1/2 cup carb  Snack: skip OR rice cake     Beverages: water  Volume of beverage intake: 80 oz+    Weekends: Same  Cravings: none identified   Trouble area of day: not identified     Frequency of Eating out: none currently  Food restrictions:  n/a  Cooking: self   Food Shopping: self    Physical Activity Intake  Activity:walking M, W, F 2 5 miles   Frequency:several times per week  Physical limitations/barriers to exercise: n/a    Estimated Needs  Energy  Bear Leeann Energy Needs: BMR : 1055   1-2# loss weekly sedentary: 8387-8493             1-2# loss weekly lightly active: 6642-4419  Maintenance calories for sedentary activity level: 2128  Protein:  gm      (1 2-1 5g/kg IBW)  Fluid:  84 oz      (35mL/kg IBW)    Nutrition Diagnosis  Yes; Overweight/obesity  related to Excess energy intake as evidenced by  BMI more than normative standard for age and sex (obesity-grade I 26-30  9)       Nutrition Intervention    Nutrition Prescription  Calories: 6099-7059  Protein: 105-115 gm  Carb: 75-95 gm    Meal Plan (Rick/Pro/Carb)  Snack: 100-150, 5-10, 10-20  Breakfast: 200, 27, 10  Lunch: 200, 27, 10  Snack: 160, 15, 18  Dinner: 350-425, 30-35, 25-35  Snack:     Nutrition Education:    Calorie controlled meal plan  Food logging/measuring  Hydration  fiber  Physical activity     Nutrition Counseling:  Strategies: as above    Monitoring and Evaluation:  Evaluation criteria:  Energy Intake  Meet protein needs  Maintain adequate hydration  Monitor weekly weight  Physical activity   Calorie controlled meal plan  Healthy snacks  Food logging/measuring    Barriers to learning:none  Readiness to change: Action:  (Changing behavior)  Comprehension: very good  Expected Compliance: very good

## 2022-11-08 LAB — METHYLMALONATE SERPL-SCNC: 220 NMOL/L (ref 0–378)

## 2022-11-09 ENCOUNTER — OFFICE VISIT (OUTPATIENT)
Dept: HEMATOLOGY ONCOLOGY | Facility: HOSPITAL | Age: 52
End: 2022-11-09

## 2022-11-09 VITALS
TEMPERATURE: 97.4 F | HEART RATE: 73 BPM | HEIGHT: 71 IN | RESPIRATION RATE: 16 BRPM | BODY MASS INDEX: 33.04 KG/M2 | SYSTOLIC BLOOD PRESSURE: 120 MMHG | OXYGEN SATURATION: 99 % | WEIGHT: 236 LBS | DIASTOLIC BLOOD PRESSURE: 80 MMHG

## 2022-11-09 DIAGNOSIS — E83.110 HEREDITARY HEMOCHROMATOSIS (HCC): Primary | ICD-10-CM

## 2022-11-10 DIAGNOSIS — F98.8 ATTENTION DEFICIT DISORDER (ADD) WITHOUT HYPERACTIVITY: ICD-10-CM

## 2022-11-10 RX ORDER — DEXTROAMPHETAMINE SACCHARATE, AMPHETAMINE ASPARTATE MONOHYDRATE, DEXTROAMPHETAMINE SULFATE AND AMPHETAMINE SULFATE 7.5; 7.5; 7.5; 7.5 MG/1; MG/1; MG/1; MG/1
30 CAPSULE, EXTENDED RELEASE ORAL DAILY
Qty: 30 CAPSULE | Refills: 0 | Status: SHIPPED | OUTPATIENT
Start: 2022-11-10 | End: 2022-11-11 | Stop reason: SDUPTHER

## 2022-11-11 DIAGNOSIS — F98.8 ATTENTION DEFICIT DISORDER (ADD) WITHOUT HYPERACTIVITY: ICD-10-CM

## 2022-11-11 RX ORDER — DEXTROAMPHETAMINE SACCHARATE, AMPHETAMINE ASPARTATE MONOHYDRATE, DEXTROAMPHETAMINE SULFATE AND AMPHETAMINE SULFATE 7.5; 7.5; 7.5; 7.5 MG/1; MG/1; MG/1; MG/1
30 CAPSULE, EXTENDED RELEASE ORAL DAILY
Qty: 30 CAPSULE | Refills: 0 | Status: SHIPPED | OUTPATIENT
Start: 2022-11-11

## 2022-11-11 RX ORDER — DEXTROAMPHETAMINE SACCHARATE, AMPHETAMINE ASPARTATE MONOHYDRATE, DEXTROAMPHETAMINE SULFATE AND AMPHETAMINE SULFATE 7.5; 7.5; 7.5; 7.5 MG/1; MG/1; MG/1; MG/1
30 CAPSULE, EXTENDED RELEASE ORAL DAILY
Qty: 30 CAPSULE | Refills: 0 | Status: SHIPPED | OUTPATIENT
Start: 2022-11-11 | End: 2022-11-11 | Stop reason: SDUPTHER

## 2022-11-11 NOTE — TELEPHONE ENCOUNTER
Requested medication(s) are due for refill today: Yes  Patient has already received a courtesy refill: No  Other reason request has been forwarded to provider: duplicate, I believe I sent you a message already in regards to this medication [facility] [place]

## 2022-11-16 ENCOUNTER — OFFICE VISIT (OUTPATIENT)
Dept: BARIATRICS | Facility: CLINIC | Age: 52
End: 2022-11-16

## 2022-11-16 VITALS — WEIGHT: 234 LBS | BODY MASS INDEX: 31.69 KG/M2 | HEIGHT: 72 IN

## 2022-11-16 DIAGNOSIS — R63.5 ABNORMAL WEIGHT GAIN: ICD-10-CM

## 2022-12-05 NOTE — PROGRESS NOTES
Weight Management Medical Nutrition Assessment   Is here for meal planning  Current wt:  229 6   lbs  She has lost 4 4 lbs x 3 weeks with overall loss of   43 8  lbs x  Just over 5 months  Has not been logging since Thanksgiving but feels since she sticks with the same type of routine  Encouraged to resume logging if she starts incorporating other foods  She does still continue to measure  Continues to walk but has not yet started strength training  Her plan is to start strength training right after [place]  Upcoming holiday and weekend trip discussed  She will f/u in 1 month  Patient seen by Medical Provider in past 6 months:  yes  Requested to schedule appointment with Medical Provider: No    Anthropometric Measurements  Start Weight (#): 273 4 lbs 6/29/22    Current Weight (#): 229 9 lbs   TBW % Change from start weight: 16%  Ideal Body Weight (#): 182 6 lbs BMI 25 (5'11 7" 157 5 lbs)  Goal Weight (#): 200 lbs  Highest:  Lowest:    Weight Loss History  Previous weight loss attempts: Commercial Programs ([employer], [employer], etc )  Exercise  Self Created Diets (Portion Control, Healthy Food Choices, etc )    Food and Nutrition Related History  Wake up: 4:30-5:30   Bed Time: 10    Food Recall  Snack: 7:00 Tristian's killer thin, 1 tbsp pb or egg, small amount of butter  Breakfast: 9-9:30 replacement    Lunch: 1:00-1:30  replacement, fruit  Snack: 4:00 bar     Dinner: 6:00: 5-6 oz chicken/pork/hamburger, veggies, 1/2 cup carb  Snack: skip OR greek yogurt or cottage cheese w/blueberries     Beverages: water  Volume of beverage intake: 80 oz+    Weekends: Same  Cravings: none identified   Trouble area of day: not identified     Frequency of Eating out:    Food restrictions:  n/a  Cooking: self   Food Shopping: self    Physical Activity Intake  Activity: walking  Frequency:3-4x/wk  Physical limitations/barriers to exercise: n/a    Estimated Needs  Energy  [employer] Energy Needs:  BMR : 6840   1-2# loss weekly sedentary: 1237-4806             1-2# loss weekly lightly active: 4749-1363  Maintenance calories for sedentary activity level: 2104  Protein:  gm      (1 2-1 5g/kg IBW)  Fluid:  84 oz      (35mL/kg IBW)    Nutrition Diagnosis  Yes; Overweight/obesity  related to Excess energy intake as evidenced by  BMI more than normative standard for age and sex (obesity-grade I 26-30  9)       Nutrition Intervention    Nutrition Prescription  Calories: 7080-5757  Protein: 105-115 gm  Carb: 75-95 gm    Meal Plan (Rick/Pro/Carb)  Snack: 100-150, 5-10, 10-20  Breakfast: 200, 27, 10  Lunch: 200, 27, 10  Snack: 160, 15, 18  Dinner: 350-425, 30-35, 25-35  Snack:     Nutrition Education:    Calorie controlled meal plan  Food logging/measuring  Hydration  fiber  Physical activity     Nutrition Counseling:  Strategies: as above    Monitoring and Evaluation:  Evaluation criteria:  Energy Intake  Meet protein needs  Maintain adequate hydration  Monitor weekly weight  Physical activity   Calorie controlled meal plan  Healthy snacks  Food logging/measuring    Barriers to learning:none  Readiness to change: Action:  (Changing behavior)  Comprehension: very good  Expected Compliance: very good

## 2022-12-07 ENCOUNTER — OFFICE VISIT (OUTPATIENT)
Dept: BARIATRICS | Facility: CLINIC | Age: 52
End: 2022-12-07

## 2022-12-07 VITALS — BODY MASS INDEX: 31.1 KG/M2 | HEIGHT: 72 IN | WEIGHT: 229.6 LBS

## 2022-12-07 DIAGNOSIS — R63.5 ABNORMAL WEIGHT GAIN: ICD-10-CM

## 2022-12-10 DIAGNOSIS — F98.8 ATTENTION DEFICIT DISORDER (ADD) WITHOUT HYPERACTIVITY: ICD-10-CM

## 2022-12-12 RX ORDER — DEXTROAMPHETAMINE SACCHARATE, AMPHETAMINE ASPARTATE MONOHYDRATE, DEXTROAMPHETAMINE SULFATE AND AMPHETAMINE SULFATE 7.5; 7.5; 7.5; 7.5 MG/1; MG/1; MG/1; MG/1
30 CAPSULE, EXTENDED RELEASE ORAL DAILY
Qty: 30 CAPSULE | Refills: 0 | Status: SHIPPED | OUTPATIENT
Start: 2022-12-12

## 2023-01-02 NOTE — PROGRESS NOTES
Weight Management Medical Nutrition Assessment   Is here for meal planning  Current wt: 235 2 lbs  She has gained 5 6  lbs x 1 month with overall loss of  38 2   lbs x just over 6 months  Struggled the week between Christmas and New Year's due to being home  Now having a hard time getting back on track  Discussed self accountability with tracking is key  Has been doing more physical activity but noticing she is hungry on those days  Reviewed calorie needs for exercise days and suggestions provided  She will f/u in 1 month  Patient seen by Medical Provider in past 6 months:  yes  Requested to schedule appointment with Medical Provider: No    Anthropometric Measurements  Start Weight (#): 273 4 lbs 6/29/22    Current Weight (#): 235 2 lbs   TBW % Change from start weight: 13 9%  Ideal Body Weight (#): 182 6 lbs BMI 25 (5'11 7" 157 5 lbs)  Goal Weight (#): 200 lbs  Highest:  Lowest:    Weight Loss History  Previous weight loss attempts: Commercial Programs ([employer], [employer], etc )  Exercise  Self Created Diets (Portion Control, Healthy Food Choices, etc )    Food and Nutrition Related History  Wake up: 4:30-5:30   Bed Time: 10    Food Recall  Snack: 6:00 Tristian's killer thin, 1 tbsp pb or egg, small amount of butter  Breakfast: 9-9:30 replacement    Lunch: 12:30-1:00  replacement, fruit, string cheese  Snack: 4:00 bar     Dinner: 6:00: 5-6 oz chicken/pork/hamburger/steak, 1 1/2 cup veggies, 1 cup carb  Snack: skip     Beverages: water  Volume of beverage intake: 80 oz+    Weekends: Same  Cravings: none identified   Trouble area of day: not identified     Frequency of Eating out:    Food restrictions:  n/a  Cooking: self   Food Shopping: self    Physical Activity Intake  Activity: walking, fitness  workouts   Frequency:3-4x/wk, 2x/wk fitness   Physical limitations/barriers to exercise: n/a    Estimated Needs  Energy  Bear Leeann Energy Needs:  BMR : 3289   1-2# loss weekly sedentary: 5733-6565             1-2# loss weekly lightly active: 1987-9996  Maintenance calories for sedentary activity level: 2135  Protein:  gm      (1 2-1 5g/kg IBW)  Fluid:  84 oz      (35mL/kg IBW)    Nutrition Diagnosis  Yes; Overweight/obesity  related to Excess energy intake as evidenced by  BMI more than normative standard for age and sex (obesity-grade I 26-30  9)       Nutrition Intervention    Nutrition Prescription  Calories: 1794-9689 increase to 1400-500 on exercise   Protein: 105-115 gm  Carb: 75-95 gm    Meal Plan (Rick/Pro/Carb)  Snack: 100-300, 10-15, 15-25  Breakfast: 200, 27, 10  Lunch: 200-300, 27-35, 10-15  Snack: 160, 15, 18  Dinner: 450, 30-35, 25-35  Snack:     Nutrition Education:    Calorie controlled meal plan  Food logging/measuring  Hydration  fiber  Physical activity     Nutrition Counseling:  Strategies: as above    Monitoring and Evaluation:  Evaluation criteria:  Energy Intake  Meet protein needs  Maintain adequate hydration  Monitor weekly weight  Physical activity   Calorie controlled meal plan  Healthy snacks  Food logging/measuring    Barriers to learning:none  Readiness to change: Action:  (Changing behavior)  Comprehension: very good  Expected Compliance: very good

## 2023-01-10 DIAGNOSIS — F98.8 ATTENTION DEFICIT DISORDER (ADD) WITHOUT HYPERACTIVITY: ICD-10-CM

## 2023-01-10 RX ORDER — DEXTROAMPHETAMINE SACCHARATE, AMPHETAMINE ASPARTATE MONOHYDRATE, DEXTROAMPHETAMINE SULFATE AND AMPHETAMINE SULFATE 7.5; 7.5; 7.5; 7.5 MG/1; MG/1; MG/1; MG/1
30 CAPSULE, EXTENDED RELEASE ORAL DAILY
Qty: 30 CAPSULE | Refills: 0 | Status: SHIPPED | OUTPATIENT
Start: 2023-01-10 | End: 2023-01-12 | Stop reason: SDUPTHER

## 2023-01-11 ENCOUNTER — OFFICE VISIT (OUTPATIENT)
Dept: BARIATRICS | Facility: CLINIC | Age: 53
End: 2023-01-11

## 2023-01-11 VITALS — WEIGHT: 235.2 LBS | HEIGHT: 72 IN | BODY MASS INDEX: 31.86 KG/M2

## 2023-01-11 DIAGNOSIS — R63.5 ABNORMAL WEIGHT GAIN: ICD-10-CM

## 2023-01-12 DIAGNOSIS — F98.8 ATTENTION DEFICIT DISORDER (ADD) WITHOUT HYPERACTIVITY: ICD-10-CM

## 2023-01-12 RX ORDER — DEXTROAMPHETAMINE SACCHARATE, AMPHETAMINE ASPARTATE MONOHYDRATE, DEXTROAMPHETAMINE SULFATE AND AMPHETAMINE SULFATE 7.5; 7.5; 7.5; 7.5 MG/1; MG/1; MG/1; MG/1
30 CAPSULE, EXTENDED RELEASE ORAL DAILY
Qty: 30 CAPSULE | Refills: 0 | Status: SHIPPED | OUTPATIENT
Start: 2023-01-12

## 2023-01-12 NOTE — TELEPHONE ENCOUNTER
Requested medication(s) are due for refill today: Yes  Patient has already received a courtesy refill: No  Other reason request has been forwarded to provider:  Please see pharmacy note

## 2023-02-10 NOTE — PROGRESS NOTES
Weight Management Medical Nutrition Assessment   Is here for meal planning  Current wt: 230 4  lbs  She has lost 4 8  lbs x 1 month with overall loss of   43   lbs x  7 1/2 months  Reports things are going much better since last visit  Doing better with tracking and has decreased carb intake  Continues to exercise both cardio and strength  Has added additional protein/carb to her meal replacements which seems to be helping with hunger  She has no concerns at this time  F/u in 2 months  Patient seen by Medical Provider in past 6 months:  yes  Requested to schedule appointment with Medical Provider: No    Anthropometric Measurements  Start Weight (#): 273 4 lbs 6/29/22    Current Weight (#): 230 4 lbs   TBW % Change from start weight: 15 7%  Ideal Body Weight (#): 182 6 lbs BMI 25 (5'11 7" 157 5 lbs)  Goal Weight (#): 200 lbs  Highest:  Lowest:    Weight Loss History  Previous weight loss attempts: Commercial Programs ([employer], Lynne Jonelle, etc )  Exercise  Self Created Diets (Portion Control, Healthy Food Choices, etc )    Food and Nutrition Related History  Wake up: 4:30-5:30   Bed Time: 10    Food Recall  Snack: 6:00 Tristian's killer thin, 1 tbsp pb    Breakfast: 9-9:30 replacement, cottage cheese, fruit OR on w/e 2 eggs, wang, 1 toast   Lunch: 12:30-1:00  replacement, fruit, string cheese   Snack: 4:00 bar     Dinner: 6:00: 5-6 oz chicken/pork/hamburger/steak, 1 1/2 cup veggies/salad, 3/4 cup carb  Snack: skip     Beverages: water  Volume of beverage intake: 80 oz+    Weekends: Same  Cravings: none identified   Trouble area of day: not identified     Frequency of Eating out:    Food restrictions:  n/a  Cooking: self   Food Shopping: self    Physical Activity Intake  Activity: walking, fitness  workouts   Frequency:5x/wk 40 minutes, 2x/wk fitness   Physical limitations/barriers to exercise: n/a    Estimated Needs  Energy  Bear Leeann Energy Needs:  BMR : 7999   1-2# loss weekly sedentary: 2112-1611             1-2# loss weekly lightly active: 6777-3392  Maintenance calories for sedentary activity level: 2103  Protein:  gm      (1 2-1 5g/kg IBW)  Fluid:  84 oz      (35mL/kg IBW)    Nutrition Diagnosis  Yes; Overweight/obesity  related to Excess energy intake as evidenced by  BMI more than normative standard for age and sex (obesity-grade I 26-30  9)       Nutrition Intervention    Nutrition Prescription  Calories: 5200-2122 increase to 1400-500 on exercise   Protein: 105-115 gm  Carb: 75-95 gm    Meal Plan (Rick/Pro/Carb)  Snack: 100-300, 10-15, 15-25  Breakfast: 200, 27, 10  Lunch: 200-300, 27-35, 10-15  Snack: 160, 15, 18  Dinner: 450, 30-35, 25-35  Snack:     Nutrition Education:    Calorie controlled meal plan  Food logging/measuring  Hydration  fiber  Physical activity     Nutrition Counseling:  Strategies: as above    Monitoring and Evaluation:  Evaluation criteria:  Energy Intake  Meet protein needs  Maintain adequate hydration  Monitor weekly weight  Physical activity   Calorie controlled meal plan  Healthy snacks  Food logging/measuring    Barriers to learning:none  Readiness to change: Action:  (Changing behavior)  Comprehension: very good  Expected Compliance: very good

## 2023-02-15 ENCOUNTER — OFFICE VISIT (OUTPATIENT)
Dept: BARIATRICS | Facility: CLINIC | Age: 53
End: 2023-02-15

## 2023-02-15 VITALS — HEIGHT: 72 IN | WEIGHT: 230.4 LBS | BODY MASS INDEX: 31.21 KG/M2

## 2023-02-15 DIAGNOSIS — R63.5 ABNORMAL WEIGHT GAIN: ICD-10-CM

## 2023-03-15 ENCOUNTER — TELEPHONE (OUTPATIENT)
Dept: FAMILY MEDICINE CLINIC | Facility: HOSPITAL | Age: 53
End: 2023-03-15

## 2023-03-15 DIAGNOSIS — F98.8 ATTENTION DEFICIT DISORDER, UNSPECIFIED HYPERACTIVITY PRESENCE: Primary | ICD-10-CM

## 2023-03-15 RX ORDER — METHYLPHENIDATE HYDROCHLORIDE 30 MG/1
30 CAPSULE, EXTENDED RELEASE ORAL EVERY MORNING
Qty: 30 CAPSULE | Refills: 0 | Status: SHIPPED | OUTPATIENT
Start: 2023-03-15 | End: 2023-04-12 | Stop reason: ALTCHOICE

## 2023-03-15 NOTE — TELEPHONE ENCOUNTER
Patient takes adderrall 30mg er  This cannot be found at any local pharmacies  Her pharmacy told her they have adderrall 30mg regular release  If this is ok for her to take can you send to the pharm?     [facility], [address]

## 2023-04-12 DIAGNOSIS — F98.8 ATTENTION DEFICIT DISORDER, UNSPECIFIED HYPERACTIVITY PRESENCE: Primary | ICD-10-CM

## 2023-04-12 RX ORDER — LISDEXAMFETAMINE DIMESYLATE CAPSULES 40 MG/1
40 CAPSULE ORAL EVERY MORNING
Qty: 30 CAPSULE | Refills: 0 | Status: SHIPPED | OUTPATIENT
Start: 2023-04-12 | End: 2023-05-09 | Stop reason: SDUPTHER

## 2023-05-02 NOTE — PROGRESS NOTES
"Weight Management Medical Nutrition Assessment   Is here for meal planning  Current wt: 237 1    lbs  She has  Lost 3 1   lbs x ~1 month with overall loss of  36 3  lbs x  10 1/2 months  Seen by DO ~1 month ago and at that time it was recommended that she discuss changing from ritalin to vyvanse to manage ADD  She did switch to vyvanse but is feeling very hungry  Is drinking more water and chewing more gum to help with appetite  Tracking & avg 2456-7291 calories/day  Suggest having structured snack at trouble time which has been between coming home and dinner  Could also try midmorning snack to see if this helps with afternoon hunger  Also recommend having snack or meal without multitasking as this can affect satisfaction  She will f/u in 1 month        Patient seen by Medical Provider in past 6 months:  no  Requested to schedule appointment with Medical Provider: No    Anthropometric Measurements  Start Weight (#): 273 4 lbs 6/29/22    Current Weight (#): 237 1 lbs   TBW % Change from start weight: 13 3%  Ideal Body Weight (#): 182 6 lbs BMI 25 (5'11 7\" 157 5 lbs)  Goal Weight (#): 200 lbs  Highest:  Lowest:    Weight Loss History  Previous weight loss attempts: Commercial Programs ("[employer], Rashida Reyes, etc )  Exercise  Self Created Diets (Portion Control, Healthy Food Choices, etc )    Food and Nutrition Related History  Wake up: 4:30-5:30   Bed Time: 10    Food Recall  Snack: 6:00 Tristian's killer thin, 1 tbsp pb    Breakfast: 9-9:30 replacement, light & fit greek yogurt w/berries OR cottage cheese  Lunch: 12:30:  replacement, fruit, string cheese or 1 oz cheese OR on w/e will have leftovers   Snack: 3:30 bar     Dinner: 7:00: ~6 oz chicken/pork/hamburger/steak, 1 1/2 cup veggies/salad, sometimes carb  Snack: skip OR a few bites of ice cream     Beverages: water  Volume of beverage intake: 80 oz+    Weekends: Same  Cravings: none identified   Trouble area of day: after work    Frequency of Eating out:  " Varies   Food restrictions:  n/a  Cooking: self   Food Shopping: self    Physical Activity Intake  Activity: walking, fitness  workouts   Frequency:5x/wk 3 mile walk, 2-3x/wk fitness   Physical limitations/barriers to exercise: n/a    Estimated Needs  Energy  [employer] Needs: BMR : 4793   1-2# loss weekly sedentary: 8797-6150             1-2# loss weekly lightly active: 8348-4532  Maintenance calories for sedentary activity level: 2139  Protein:  gm      (1 2-1 5g/kg IBW)  Fluid:  84 oz      (35mL/kg IBW)    Nutrition Diagnosis  Yes; Overweight/obesity  related to Excess energy intake as evidenced by  BMI more than normative standard for age and sex (obesity-grade I 26-30  9)       Nutrition Intervention    Nutrition Prescription  Calories: 3061-8646  Protein: 125-145 gm  Carb: 100-145 gm    Meal Plan (Rick/Pro/Carb)  Snack: 200-300, 10-15, 15-25  Breakfast: 300, 35, 20  Lunch: 350,  35, 20-30  Snack: 160, 15, 18  Dinner: 450, 30-35, 25-35  Snack: 0150, 0-10     Nutrition Education:    Calorie controlled meal plan  Food logging/measuring  Hydration  fiber  Physical activity     Nutrition Counseling:  Strategies: as above    Monitoring and Evaluation:  Evaluation criteria:  Energy Intake  Meet protein needs  Maintain adequate hydration  Monitor weekly weight  Physical activity   Calorie controlled meal plan  Healthy snacks  Food logging/measuring    Barriers to learning:none  Readiness to change: Action:  (Changing behavior)  Comprehension: very good  Expected Compliance: very good

## 2023-05-05 ENCOUNTER — APPOINTMENT (OUTPATIENT)
Dept: LAB | Facility: HOSPITAL | Age: 53
End: 2023-05-05

## 2023-05-05 DIAGNOSIS — E83.110 HEREDITARY HEMOCHROMATOSIS (HCC): ICD-10-CM

## 2023-05-05 LAB
ALBUMIN SERPL BCP-MCNC: 3.9 G/DL (ref 3.5–5)
ALP SERPL-CCNC: 119 U/L (ref 46–116)
ALT SERPL W P-5'-P-CCNC: 29 U/L (ref 12–78)
ANION GAP SERPL CALCULATED.3IONS-SCNC: 2 MMOL/L (ref 4–13)
AST SERPL W P-5'-P-CCNC: 23 U/L (ref 5–45)
BASOPHILS # BLD AUTO: 0.01 THOUSANDS/ÂΜL (ref 0–0.1)
BASOPHILS NFR BLD AUTO: 0 % (ref 0–1)
BILIRUB SERPL-MCNC: 0.91 MG/DL (ref 0.2–1)
BUN SERPL-MCNC: 21 MG/DL (ref 5–25)
CALCIUM SERPL-MCNC: 9.7 MG/DL (ref 8.3–10.1)
CHLORIDE SERPL-SCNC: 106 MMOL/L (ref 96–108)
CO2 SERPL-SCNC: 29 MMOL/L (ref 21–32)
CREAT SERPL-MCNC: 0.66 MG/DL (ref 0.6–1.3)
EOSINOPHIL # BLD AUTO: 0.14 THOUSAND/ÂΜL (ref 0–0.61)
EOSINOPHIL NFR BLD AUTO: 3 % (ref 0–6)
ERYTHROCYTE [DISTWIDTH] IN BLOOD BY AUTOMATED COUNT: 11.9 % (ref 11.6–15.1)
FERRITIN SERPL-MCNC: 22 NG/ML (ref 8–388)
GFR SERPL CREATININE-BSD FRML MDRD: 100 ML/MIN/1.73SQ M
GLUCOSE P FAST SERPL-MCNC: 89 MG/DL (ref 65–99)
HCT VFR BLD AUTO: 46.4 % (ref 34.8–46.1)
HGB BLD-MCNC: 15.4 G/DL (ref 11.5–15.4)
IMM GRANULOCYTES # BLD AUTO: 0.01 THOUSAND/UL (ref 0–0.2)
IMM GRANULOCYTES NFR BLD AUTO: 0 % (ref 0–2)
IRON SATN MFR SERPL: 36 % (ref 15–50)
IRON SERPL-MCNC: 108 UG/DL (ref 50–170)
LYMPHOCYTES # BLD AUTO: 2.67 THOUSANDS/ÂΜL (ref 0.6–4.47)
LYMPHOCYTES NFR BLD AUTO: 47 % (ref 14–44)
MCH RBC QN AUTO: 32.1 PG (ref 26.8–34.3)
MCHC RBC AUTO-ENTMCNC: 33.2 G/DL (ref 31.4–37.4)
MCV RBC AUTO: 97 FL (ref 82–98)
MONOCYTES # BLD AUTO: 0.34 THOUSAND/ÂΜL (ref 0.17–1.22)
MONOCYTES NFR BLD AUTO: 6 % (ref 4–12)
NEUTROPHILS # BLD AUTO: 2.52 THOUSANDS/ÂΜL (ref 1.85–7.62)
NEUTS SEG NFR BLD AUTO: 44 % (ref 43–75)
NRBC BLD AUTO-RTO: 0 /100 WBCS
PLATELET # BLD AUTO: 231 THOUSANDS/UL (ref 149–390)
PMV BLD AUTO: 10.7 FL (ref 8.9–12.7)
POTASSIUM SERPL-SCNC: 4.1 MMOL/L (ref 3.5–5.3)
PROT SERPL-MCNC: 7.8 G/DL (ref 6.4–8.4)
RBC # BLD AUTO: 4.8 MILLION/UL (ref 3.81–5.12)
SODIUM SERPL-SCNC: 137 MMOL/L (ref 135–147)
TIBC SERPL-MCNC: 301 UG/DL (ref 250–450)
WBC # BLD AUTO: 5.69 THOUSAND/UL (ref 4.31–10.16)

## 2023-05-05 NOTE — PROGRESS NOTES
HEMATOLOGY / [address] FOLLOW UP NOTE    Primary Care Provider: NATHALIE Ferrara  Referring Provider:    MRN: [id number]  : 1970    Reason for Encounter: Follow-up for hereditary hemochromatosis     Interval History: Patient returns for follow-up visit  She continues to donate blood at [facility] blood bank every 56 days or so  She last donated approximately 3 weeks ago  She tolerates blood donation well and this has helped keep her numbers in stable range  Most recent blood work shows ferritin 22, iron saturation 36%  Overall, she feels well  Denies any concerning symptoms today      REVIEW OF SYSTEMS:  Please note that a 14-point review of systems was performed to include Constitutional, HEENT, Respiratory, CVS, GI, , Musculoskeletal, Integumentary, Neurologic, Rheumatologic, Endocrinologic, Psychiatric, Lymphatic, and Hematologic/Oncologic systems were reviewed and are negative unless otherwise stated in HPI  Positive and negative findings pertinent to this evaluation are incorporated into the history of present illness  ECOG PS: 0    PROBLEM LIST:  Patient Active Problem List   Diagnosis   • ADD (attention deficit disorder)   • Allergic rhinitis   • Hereditary hemochromatosis ([facility] [address] )   • HUMPHREY (obstructive sleep apnea)   • Obesity, Class I, BMI 30-34 9       Assessment / Plan:  1  Hereditary hemochromatosis [facility])    Patient donates blood at [facility] blood bank on a regular basis  She likes going here as a will use her blood instead of throwing it away  Her blood donation schedule has kept her numbers in safe range  Most recent ferritin 22  She will continue on her current blood donation schedule as she is tolerating this without difficulty  I will see her back in 6 months with repeat blood work  She knows to call at anytime with questions or concerns    I spent 25 minutes on chart review, face to face counseling time, coordination of care and documentation      Past Medical History:   has a past medical history of Class 2 severe obesity due to excess calories with serious comorbidity and body mass index (BMI) of 35 0 to 35 9 in adult [facility]), Hemochromatosis, Hereditary hemochromatosis ([facility] [address] ) (4/5/2018), and IBS (irritable bowel syndrome)  PAST SURGICAL HISTORY:   has a past surgical history that includes Colonoscopy; Hysteroscopy w/ endometrial ablation; Nasal septum surgery; pr corrj hallux valgus w/sesmdc w/1metar medial cnf (Left, 03/19/2021); and Bunionectomy (Left, 03/2021)  CURRENT MEDICATIONS  Current Outpatient Medications   Medication Sig Dispense Refill   • cetirizine (ZyrTEC) 10 mg tablet Take 10 mg by mouth daily     • Cholecalciferol (VITAMIN D) 2000 units CAPS Take 1 capsule by mouth daily     • fluticasone (FLONASE) 50 mcg/act nasal spray 1 spray into each nostril daily     • lisdexamfetamine (VYVANSE) 60 MG capsule Take 1 capsule (60 mg total) by mouth every morning Max Daily Amount: 60 mg 30 capsule 0   • Multiple Vitamin (multivitamin) tablet Take 1 tablet by mouth daily     • Probiotic Product (PROBIOTIC DAILY PO) Take by mouth     • ZINC-VITAMIN C PO Take by mouth     • triamcinolone (KENALOG) 0 1 % cream Apply topically 2 (two) times a day (Patient not taking: Reported on 5/10/2023) 30 g 0     No current facility-administered medications for this visit  [unfilled]    SOCIAL HISTORY:   reports that she quit smoking about 20 years ago  Her smoking use included cigarettes  She has a 1 00 pack-year smoking history  She has never used smokeless tobacco  She reports current alcohol use  She reports that she does not use drugs  FAMILY HISTORY:  family history includes Arrhythmia in her mother; Heart attack in her maternal aunt, maternal grandfather, and paternal grandfather; Heart disease in her father and maternal aunt;  Hypertension in her mother; No Known Problems in her daughter, daughter, maternal grandmother, paternal aunt, paternal aunt, paternal "grandmother, and sister; Uterine cancer (age of onset: 61) in her mother  ALLERGIES:  has No Known Allergies  Physical Exam:  Vital Signs:   Visit Vitals  /76   Pulse 72   Temp 98 °F (36 7 °C)   Resp 18   Ht 5' 11 75\" (1 822 m)   Wt 108 kg (238 lb)   SpO2 97%   BMI 32 50 kg/m²   OB Status Having periods   Smoking Status Former   BSA 2 29 m²     Body mass index is 32 5 kg/m²  Body surface area is 2 29 meters squared  Physical Exam  Constitutional:       General: She is not in acute distress  Appearance: Normal appearance  HENT:      Head: Normocephalic and atraumatic  Eyes:      General: No scleral icterus  Right eye: No discharge  Left eye: No discharge  Conjunctiva/sclera: Conjunctivae normal    Cardiovascular:      Rate and Rhythm: Normal rate and regular rhythm  Pulmonary:      Effort: Pulmonary effort is normal  No respiratory distress  Breath sounds: Normal breath sounds  Abdominal:      General: Bowel sounds are normal  There is no distension  Palpations: Abdomen is soft  There is no mass  Tenderness: There is no abdominal tenderness  Musculoskeletal:         General: Normal range of motion  Lymphadenopathy:      Cervical: No cervical adenopathy  Upper Body:      Right upper body: No supraclavicular, axillary or pectoral adenopathy  Left upper body: No supraclavicular, axillary or pectoral adenopathy  Skin:     General: Skin is warm and dry  Neurological:      General: No focal deficit present  Mental Status: She is alert and oriented to person, place, and time     Psychiatric:         Mood and Affect: Mood normal          Behavior: Behavior normal          Labs:  Lab Results   Component Value Date    WBC 5 69 05/05/2023    HGB 15 4 05/05/2023    HCT 46 4 (H) 05/05/2023    MCV 97 05/05/2023     05/05/2023     Lab Results   Component Value Date     06/04/2014    SODIUM 137 05/05/2023    K 4 1 05/05/2023    CL " 106 05/05/2023    CO2 29 05/05/2023    ANIONGAP 1 (L) 06/04/2014    AGAP 2 (L) 05/05/2023    BUN 21 05/05/2023    CREATININE 0 66 05/05/2023    GLUC 102 (H) 06/28/2021    GLUF 89 05/05/2023    CALCIUM 9 7 05/05/2023    AST 23 05/05/2023    ALT 29 05/05/2023    ALKPHOS 119 (H) 05/05/2023    PROT 6 9 06/04/2014    TP 7 8 05/05/2023    BILITOT 1 0 06/04/2014    TBILI 0 91 05/05/2023    EGFR 100 05/05/2023

## 2023-05-09 ENCOUNTER — OFFICE VISIT (OUTPATIENT)
Dept: FAMILY MEDICINE CLINIC | Facility: HOSPITAL | Age: 53
End: 2023-05-09

## 2023-05-09 VITALS
SYSTOLIC BLOOD PRESSURE: 118 MMHG | OXYGEN SATURATION: 98 % | HEART RATE: 78 BPM | HEIGHT: 72 IN | TEMPERATURE: 97.2 F | DIASTOLIC BLOOD PRESSURE: 72 MMHG | WEIGHT: 238.8 LBS | BODY MASS INDEX: 32.34 KG/M2

## 2023-05-09 DIAGNOSIS — F98.8 ATTENTION DEFICIT DISORDER, UNSPECIFIED HYPERACTIVITY PRESENCE: Primary | ICD-10-CM

## 2023-05-09 NOTE — ASSESSMENT & PLAN NOTE
Symptoms were very well controlled on XR Adderall, unfortunately availability is limited so she was switched to Ritalin which was completely ineffective  Vyvanse was suggested by weight management so she was switched to Vyvanse 40 mg  Symptoms are not well controlled and she is reporting short duration of symptoms control  Pt agreeable to switch to 60 mg dose  Plan to f/u in 6 weeks and will update PE at that time

## 2023-05-09 NOTE — PROGRESS NOTES
Assessment/Plan:    ADD (attention deficit disorder)  Symptoms were very well controlled on XR Adderall, unfortunately availability is limited so she was switched to Ritalin which was completely ineffective  Vyvanse was suggested by weight management so she was switched to Vyvanse 40 mg  Symptoms are not well controlled and she is reporting short duration of symptoms control  Pt agreeable to switch to 60 mg dose  Plan to f/u in 6 weeks and will update PE at that time  Diagnoses and all orders for this visit:    Attention deficit disorder, unspecified hyperactivity presence  -     lisdexamfetamine (VYVANSE) 60 MG capsule; Take 1 capsule (60 mg total) by mouth every morning Max Daily Amount: 60 mg          Subjective:      Patient ID: Alpesh Chopra is a 46 y o  female  She had been on Adderall XR for years with great control of ADHD symptoms  Due to difficulty obtaining Adderall she was switched to Ritalin  Had very little benefit with ritalin  She is working with medical weight loss management and it was suggested to try Vyvanse as this has off label benefit on binge eating and weight loss  She was switched to Vyvanse 40 mg  She reports that this does work betyter than Ritalin but not as well as Adderall  She also notes wearing off by 3-4 PM  Otherwise she is tolerating  The following portions of the patient's history were reviewed and updated as appropriate: allergies, current medications, past family history, past medical history, past social history, past surgical history and problem list     Review of Systems   Cardiovascular: Negative for palpitations  Psychiatric/Behavioral: Positive for decreased concentration  Negative for sleep disturbance  Objective:  Vitals:    05/09/23 0836   BP: 118/72   Pulse: 78   Temp: (!) 97 2 °F (36 2 °C)   SpO2: 98%      Physical Exam  Vitals reviewed  Constitutional:       Appearance: Normal appearance     Cardiovascular:      Rate and Rhythm: Normal rate and regular rhythm  Heart sounds: Normal heart sounds  Pulmonary:      Effort: Pulmonary effort is normal       Breath sounds: Normal breath sounds  Neurological:      Mental Status: She is alert and oriented to person, place, and time  Psychiatric:         Mood and Affect: Mood normal          Behavior: Behavior normal          Thought Content:  Thought content normal          Judgment: Judgment normal

## 2023-05-10 ENCOUNTER — OFFICE VISIT (OUTPATIENT)
Dept: BARIATRICS | Facility: CLINIC | Age: 53
End: 2023-05-10

## 2023-05-10 ENCOUNTER — OFFICE VISIT (OUTPATIENT)
Age: 53
End: 2023-05-10

## 2023-05-10 ENCOUNTER — TELEPHONE (OUTPATIENT)
Dept: HEMATOLOGY ONCOLOGY | Facility: CLINIC | Age: 53
End: 2023-05-10

## 2023-05-10 ENCOUNTER — HOSPITAL ENCOUNTER (OUTPATIENT)
Dept: MAMMOGRAPHY | Facility: CLINIC | Age: 53
Discharge: HOME | End: 2023-05-10

## 2023-05-10 VITALS
HEART RATE: 72 BPM | SYSTOLIC BLOOD PRESSURE: 120 MMHG | HEIGHT: 72 IN | RESPIRATION RATE: 18 BRPM | WEIGHT: 238 LBS | TEMPERATURE: 98 F | DIASTOLIC BLOOD PRESSURE: 76 MMHG | BODY MASS INDEX: 32.23 KG/M2 | OXYGEN SATURATION: 97 %

## 2023-05-10 VITALS — BODY MASS INDEX: 32.12 KG/M2 | WEIGHT: 237.1 LBS | HEIGHT: 72 IN

## 2023-05-10 DIAGNOSIS — Z12.31 ENCOUNTER FOR SCREENING MAMMOGRAM FOR BREAST CANCER: ICD-10-CM

## 2023-05-10 DIAGNOSIS — R63.5 ABNORMAL WEIGHT GAIN: ICD-10-CM

## 2023-05-10 DIAGNOSIS — E83.110 HEREDITARY HEMOCHROMATOSIS (HCC): Primary | ICD-10-CM

## 2023-05-10 NOTE — TELEPHONE ENCOUNTER
Patient Running Late       Who are you speaking with? Patient   If it is not the patient, are they listed on an active communication consent form? N/A   When is the appointment scheduled? Please list date and time 05/10/23 8:30AM   At which location is the appointment scheduled to take place? [place]   If patient is running less than 15 minutes late, have patient proceed to office  Appointment may need to be rescheduled at providers discretion  If provider cannot see patient today, the office will reschedule the visit  Was this relayed to patient?  Yes         Patient was unaware of new location

## 2023-06-05 NOTE — PROGRESS NOTES
"Weight Management Medical Nutrition Assessment   Is here for meal planning  Current wt:   238 2   lbs  She has gained 1 1  lbs x ~1 month with overall loss of  35 2  lbs x  11 1/2 months  Hunger better than prior  Feels issues might be more mental  Does well during the day but once getting home struggles  Tracking but stops if she starts eating items that are \"bad  \" Also struggling with items such as ice cream, popsicles in the evening  She is going to try and start meal prepping on Sundays to see if this helps her routine  It is noted that she often multitasks at work so is doing more grazing of her foods instead of being mindful and in the moment  Recommend she stop working and focus just on her meal or snack to see if this enhances satisfaction  Offered visit with [facility] but she declined at this time  She will f/u with PA in 1 month and myself in 2 months         Patient seen by Medical Provider in past 6 months:  no  Requested to schedule appointment with Medical Provider: No    Anthropometric Measurements  Start Weight (#): 273 4 lbs 6/29/22    Current Weight (#): 238 2 lbs   TBW % Change from start weight: 12 8%  Ideal Body Weight (#): 182 6 lbs BMI 25 (5'11 7\" 157 5 lbs)  Goal Weight (#): 200 lbs  Highest:  Lowest:    Weight Loss History  Previous weight loss attempts: Commercial Programs ([employer], [employer], etc )  Exercise  Self Created Diets (Portion Control, Healthy Food Choices, etc )    Food and Nutrition Related History  Wake up: 4:30-5:30   Bed Time: 10    Food Recall  Snack: 6:00 Tristian's killer thin, 1 tbsp pb    Breakfast: 9-9:30 replacement, light & fit greek yogurt w/berries  Lunch: 12:30:  replacement, fruit, string cheese or 1 oz cheese, apple or cucumber     Snack: 3:30 bar     Dinner: 7:00: ~6 oz chicken/pork/hamburger/steak, 1 1/2 cup veggies/salad, sometimes carb OR kielbasa, pierogies  Snack: skip OR a few bites of ice cream     Beverages: water  Volume of beverage intake: 80 " oz+    Weekends: Same  Cravings: none identified   Trouble area of day: after work    Frequency of Eating out:  Varies   Food restrictions:  n/a  Cooking: self   Food Shopping: self    Physical Activity Intake  Activity: walking, fitness  workouts   Frequency:5x/wk 3 mile walk, 2-3x/wk fitness   Physical limitations/barriers to exercise: n/a    Estimated Needs  Energy  [employer] Needs: BMR : 2892   1-2# loss weekly sedentary: 5674-9540             1-2# loss weekly lightly active: 7295-5646  Maintenance calories for sedentary activity level: 2139  Protein:  gm      (1 2-1 5g/kg IBW)  Fluid:  84 oz      (35mL/kg IBW)    Nutrition Diagnosis  Yes; Overweight/obesity  related to Excess energy intake as evidenced by  BMI more than normative standard for age and sex (obesity-grade I 26-30  9)       Nutrition Intervention    Nutrition Prescription  Calories: 2095-2654  Protein: 125-145 gm  Carb: 100-145 gm    Meal Plan (Rick/Pro/Carb)  Snack: 200-300, 10-15, 15-25  Breakfast: 300, 35, 20  Lunch: 350,  35, 20-30  Snack: 160, 15, 18  Dinner: 450, 30-35, 25-35  Snack: 0150, 0-10     Nutrition Education:    Calorie controlled meal plan  Food logging/measuring  Hydration  fiber  Physical activity     Nutrition Counseling:  Strategies: as above    Monitoring and Evaluation:  Evaluation criteria:  Energy Intake  Meet protein needs  Maintain adequate hydration  Monitor weekly weight  Physical activity   Calorie controlled meal plan  Healthy snacks  Food logging/measuring    Barriers to learning:none  Readiness to change: Action:  (Changing behavior) & relapse   Comprehension: very good  Expected Compliance: good

## 2023-06-06 DIAGNOSIS — F98.8 ATTENTION DEFICIT DISORDER, UNSPECIFIED HYPERACTIVITY PRESENCE: ICD-10-CM

## 2023-06-07 ENCOUNTER — OFFICE VISIT (OUTPATIENT)
Dept: BARIATRICS | Facility: CLINIC | Age: 53
End: 2023-06-07

## 2023-06-07 VITALS — WEIGHT: 238.2 LBS | BODY MASS INDEX: 32.26 KG/M2 | HEIGHT: 72 IN

## 2023-06-07 DIAGNOSIS — R63.5 ABNORMAL WEIGHT GAIN: ICD-10-CM

## 2023-06-07 PROCEDURE — RECHECK

## 2023-06-07 PROCEDURE — WMDI30

## 2023-07-05 DIAGNOSIS — F98.8 ATTENTION DEFICIT DISORDER, UNSPECIFIED HYPERACTIVITY PRESENCE: ICD-10-CM

## 2023-07-11 ENCOUNTER — OFFICE VISIT (OUTPATIENT)
Dept: BARIATRICS | Facility: CLINIC | Age: 53
End: 2023-07-11
Payer: COMMERCIAL

## 2023-07-11 VITALS
WEIGHT: 243.2 LBS | SYSTOLIC BLOOD PRESSURE: 120 MMHG | RESPIRATION RATE: 16 BRPM | BODY MASS INDEX: 34.05 KG/M2 | DIASTOLIC BLOOD PRESSURE: 76 MMHG | HEART RATE: 78 BPM | HEIGHT: 71 IN

## 2023-07-11 DIAGNOSIS — E66.9 OBESITY, CLASS I, BMI 30-34.9: Primary | ICD-10-CM

## 2023-07-11 DIAGNOSIS — F98.8 ATTENTION DEFICIT DISORDER, UNSPECIFIED HYPERACTIVITY PRESENCE: ICD-10-CM

## 2023-07-11 DIAGNOSIS — G47.33 OSA (OBSTRUCTIVE SLEEP APNEA): ICD-10-CM

## 2023-07-11 PROCEDURE — 99214 OFFICE O/P EST MOD 30 MIN: CPT | Performed by: PHYSICIAN ASSISTANT

## 2023-07-11 NOTE — PROGRESS NOTES
Assessment/Plan:    Obesity, Class I, BMI 30-34.9  -Patient is pursuing conservative program with RD visits after VLCD   -weight currently not at goal    Initial:273.4  Current: 243. 2  Change:-30.2 total  Goal:under 200    Goals:  -continue with food tracking. Discussed trying to stay consistent with it.   -continue water intak  -continue daily walking. Discussed setting a start date and time to start weight training. Discussed medication options and risk/benefits. She will start on saxenda. Patient denies personal and family history of MCT and MEN2 tumors. Patient denies personal history of pancreatitis. Side effects discussed but not limited to diarrhea, bloating, constipation, GI upset, heartburn, increased heart rate, headache, low blood sugar, fatigue and dizziness. Titration and medication administration discussed. HUMPHREY (obstructive sleep apnea)  Should use CPAP regularly    ADD (attention deficit disorder)  Has switched to vyvanse but has not affected appetite        Follow up in approximately 1 month vitals and 4 months with Non-Surgical Physician/Advanced Practitioner. Diagnoses and all orders for this visit:    Obesity, Class I, BMI 30-34.9  -     liraglutide (SAXENDA) injection; Inject subcutaneously WEEK 1 use 0.6mg day,  WEEK 2 use 1.2mg day, WEEK 3 use 1.8mg day, WEEK 4 use 2.4mg day, WEEK 5 use 3mg day  -     Insulin Pen Needle 32G X 4 MM MISC; Use daily With saxenda    HUMPHREY (obstructive sleep apnea)    Attention deficit disorder, unspecified hyperactivity presence          Subjective:   Chief Complaint   Patient presents with   • Follow-up     MWM 3mth f/u; waist 40in        Patient ID: Radhames Marroquin  is a 46 y.o. female with excess weight/obesity here to pursue weight managment. Patient is pursuing Conservative Program.     HPI  Here for MWM followup. She has felt more hungry and also has been having cravings.   She is on vyvanse for last 3 months but it has not helped her appetite. Dose was increased to 60mg 2 months ago. Goal is to get under 200 and maintain. Wt Readings from Last 10 Encounters:   07/11/23 110 kg (243 lb 3.2 oz)   06/07/23 108 kg (238 lb 3.2 oz)   05/10/23 108 kg (237 lb 1.6 oz)   05/10/23 108 kg (238 lb)   05/09/23 108 kg (238 lb 12.8 oz)   04/12/23 108 kg (238 lb 6.4 oz)   04/11/23 109 kg (240 lb 3.2 oz)   02/15/23 105 kg (230 lb 6.4 oz)   01/11/23 107 kg (235 lb 3.2 oz)   12/07/22 104 kg (229 lb 9.6 oz)       Food logging:yes but not always consistent  Increased appetite/cravings:  Fruit/Vegetable servings:  Exercise:walking daily  Hydration:water more than 80 oz, unsweetened tea    Colonoscopy-Completed  Mammogram-completed      The following portions of the patient's history were reviewed and updated as appropriate:   She  has a past medical history of Class 2 severe obesity due to excess calories with serious comorbidity and body mass index (BMI) of 35.0 to 35.9 in adult [facility]), Hemochromatosis, Hereditary hemochromatosis ([address] ) (4/5/2018), and IBS (irritable bowel syndrome). She   Patient Active Problem List    Diagnosis Date Noted   • Obesity, Class I, BMI 30-34.9 09/12/2022   • HUMPHREY (obstructive sleep apnea)    • Hereditary hemochromatosis ([address]) 04/05/2018   • ADD (attention deficit disorder) 05/07/2012   • Allergic rhinitis 05/07/2012     She  has a past surgical history that includes Colonoscopy; Hysteroscopy w/ endometrial ablation; Nasal septum surgery; pr corrj hallux valgus w/sesmdc w/1metar medial cnf (Left, 03/19/2021); and Bunionectomy (Left, 03/2021). Her family history includes Arrhythmia in her mother; Heart attack in her maternal aunt, maternal grandfather, and paternal grandfather; Heart disease in her father and maternal aunt;  Hypertension in her mother; No Known Problems in her brother, daughter, daughter, maternal grandmother, paternal aunt, paternal aunt, paternal grandmother, and sister; Uterine cancer (age of onset: 61) in her mother. She  reports that she quit smoking about 20 years ago. Her smoking use included cigarettes. She has a 1.00 pack-year smoking history. She has never used smokeless tobacco. She reports current alcohol use. She reports that she does not use drugs. Current Outpatient Medications   Medication Sig Dispense Refill   • cetirizine (ZyrTEC) 10 mg tablet Take 10 mg by mouth daily     • Cholecalciferol (VITAMIN D) 2000 units CAPS Take 1 capsule by mouth daily     • fluticasone (FLONASE) 50 mcg/act nasal spray 1 spray into each nostril daily     • Insulin Pen Needle 32G X 4 MM MISC Use daily With saxenda 100 each 0   • liraglutide (SAXENDA) injection Inject subcutaneously WEEK 1 use 0.6mg day,  WEEK 2 use 1.2mg day, WEEK 3 use 1.8mg day, WEEK 4 use 2.4mg day, WEEK 5 use 3mg day 15 mL 1   • lisdexamfetamine (VYVANSE) 60 MG capsule Take 1 capsule (60 mg total) by mouth every morning Max Daily Amount: 60 mg 30 capsule 0   • Multiple Vitamin (multivitamin) tablet Take 1 tablet by mouth daily     • Probiotic Product (PROBIOTIC DAILY PO) Take by mouth     • triamcinolone (KENALOG) 0.1 % cream Apply topically 2 (two) times a day 30 g 0   • ZINC-VITAMIN C PO Take by mouth       No current facility-administered medications for this visit.      Current Outpatient Medications on File Prior to Visit   Medication Sig   • cetirizine (ZyrTEC) 10 mg tablet Take 10 mg by mouth daily   • Cholecalciferol (VITAMIN D) 2000 units CAPS Take 1 capsule by mouth daily   • fluticasone (FLONASE) 50 mcg/act nasal spray 1 spray into each nostril daily   • lisdexamfetamine (VYVANSE) 60 MG capsule Take 1 capsule (60 mg total) by mouth every morning Max Daily Amount: 60 mg   • Multiple Vitamin (multivitamin) tablet Take 1 tablet by mouth daily   • Probiotic Product (PROBIOTIC DAILY PO) Take by mouth   • triamcinolone (KENALOG) 0.1 % cream Apply topically 2 (two) times a day   • ZINC-VITAMIN C PO Take by mouth     No current facility-administered medications on file prior to visit. She has No Known Allergies. .    Review of Systems    Objective:    /76   Pulse 78   Resp 16   Ht 5' 11" (1.803 m)   Wt 110 kg (243 lb 3.2 oz)   LMP 01/01/2019 (Approximate)   BMI 33.92 kg/m²      Physical Exam

## 2023-07-11 NOTE — ASSESSMENT & PLAN NOTE
-Patient is pursuing conservative program with RD visits after VLCD   -weight currently not at goal    Initial:273.4  Current: 243. 2  Change:-30.2 total  Goal:under 200    Goals:  -continue with food tracking. Discussed trying to stay consistent with it.   -continue water intak  -continue daily walking. Discussed setting a start date and time to start weight training. Discussed medication options and risk/benefits. She will start on saxenda. Patient denies personal and family history of MCT and MEN2 tumors. Patient denies personal history of pancreatitis. Side effects discussed but not limited to diarrhea, bloating, constipation, GI upset, heartburn, increased heart rate, headache, low blood sugar, fatigue and dizziness. Titration and medication administration discussed.

## 2023-07-12 ENCOUNTER — TELEPHONE (OUTPATIENT)
Dept: BARIATRICS | Facility: CLINIC | Age: 53
End: 2023-07-12

## 2023-07-12 NOTE — TELEPHONE ENCOUNTER
Ashley and called pharm regarding the Saxenda approval from 07/11/2023-11/11/2023. She was also informed about the 4-5% weight loss requirement for renewal purposes.

## 2023-07-18 ENCOUNTER — OFFICE VISIT (OUTPATIENT)
Dept: FAMILY MEDICINE CLINIC | Facility: HOSPITAL | Age: 53
End: 2023-07-18
Payer: COMMERCIAL

## 2023-07-18 VITALS
BODY MASS INDEX: 34.27 KG/M2 | DIASTOLIC BLOOD PRESSURE: 72 MMHG | HEIGHT: 71 IN | TEMPERATURE: 97.6 F | SYSTOLIC BLOOD PRESSURE: 122 MMHG | WEIGHT: 244.8 LBS | HEART RATE: 74 BPM

## 2023-07-18 DIAGNOSIS — F98.8 ATTENTION DEFICIT DISORDER, UNSPECIFIED HYPERACTIVITY PRESENCE: ICD-10-CM

## 2023-07-18 DIAGNOSIS — Z13.220 SCREENING CHOLESTEROL LEVEL: ICD-10-CM

## 2023-07-18 DIAGNOSIS — Z00.00 ANNUAL PHYSICAL EXAM: Primary | ICD-10-CM

## 2023-07-18 PROCEDURE — 99396 PREV VISIT EST AGE 40-64: CPT | Performed by: NURSE PRACTITIONER

## 2023-07-18 NOTE — PROGRESS NOTES
[address] [facility] [address] [address]     NAME: Pieter Cockayne  AGE: 46 y.o. SEX: female  : 1970     DATE: 2023     Assessment and Plan:     Problem List Items Addressed This Visit        Other    ADD (attention deficit disorder)     Symptoms well controlled with Vyvanse at 60 mg. Would like to continue on this dose. Call if less effective. F/U in 1 year. Other Visit Diagnoses     Annual physical exam    -  Primary    Screening cholesterol level        Relevant Orders    Lipid panel          Immunizations and preventive care screenings were discussed with patient today. Appropriate education was printed on patient's after visit summary. Counseling:  Alcohol/drug use: discussed moderation in alcohol intake, the recommendations for healthy alcohol use, and avoidance of illicit drug use. Dental Health: discussed importance of regular tooth brushing, flossing, and dental visits. Injury prevention: discussed safety/seat belts, safety helmets, smoke detectors, carbon dioxide detectors, and smoking near bedding or upholstery. Sexual health: discussed sexually transmitted diseases, partner selection, use of condoms, avoidance of unintended pregnancy, and contraceptive alternatives. · Exercise: the importance of regular exercise/physical activity was discussed. Recommend exercise 3-5 times per week for at least 30 minutes. Return in about 6 months (around 2024) for Annual physical and f/u. Chief Complaint:     Chief Complaint   Patient presents with   • Annual Exam      History of Present Illness:     Adult Annual Physical   Patient here for a comprehensive physical exam. The patient reports no problems. 60 mg Vyvanse has been very effective. No AE. Would like to continue on this dose. Will be starting on Saxenda.  Waiting for dose to become available,     Diet and Physical Activity  · Diet/Nutrition: well balanced diet. · Exercise: 5-7 times a week on average. Depression Screening  PHQ-2/9 Depression Screening         General Health  · Sleep: sleeps well. · Hearing: normal - bilateral.  · Vision: goes for regular eye exams. · Dental: regular dental visits. /GYN Health  · Patient is: postmenopausal       Review of Systems:     Review of Systems   Constitutional: Negative. HENT: Negative. Negative for congestion, dental problem, ear pain, hearing loss, postnasal drip, rhinorrhea, sinus pressure, sinus pain, sore throat, tinnitus and trouble swallowing. Eyes: Negative. Respiratory: Negative. Cardiovascular: Negative. Gastrointestinal: Negative. Endocrine: Negative. Genitourinary: Negative. Musculoskeletal: Negative. Skin: Negative. Allergic/Immunologic: Negative. Neurological: Negative. Hematological: Negative. Psychiatric/Behavioral: Negative.        Past Medical History:     Past Medical History:   Diagnosis Date   • Class 2 severe obesity due to excess calories with serious comorbidity and body mass index (BMI) of 35.0 to 35.9 in adult [facility])    • Hemochromatosis    • Hereditary hemochromatosis ([address] [facility]) 4/5/2018   • IBS (irritable bowel syndrome)       Past Surgical History:     Past Surgical History:   Procedure Laterality Date   • BUNIONECTOMY Left 03/2021   • COLONOSCOPY      poor prep colonoscopy 2016   • HYSTEROSCOPY W/ ENDOMETRIAL ABLATION     • NASAL SEPTUM SURGERY     • AK CORRJ HALLUX VALGUS W/SESMDC W/1METAR MEDIAL CNF Left 03/19/2021    Procedure: BUNIONECTOMY LAPIPLASTY LEFT FOOT;  Surgeon: Raffaele Boudreaux DPM;  Location:  MAIN OR;  Service: Podiatry      Social History:     Social History     Socioeconomic History   • Marital status: /Civil Union     Spouse name: None   • Number of children: None   • Years of education: None   • Highest education level: None   Occupational History   • None   Tobacco Use   • Smoking status: Former     Packs/day: 0.20     Years: 5.00     Total pack years: 1.00     Types: Cigarettes     Quit date:      Years since quittin.5   • Smokeless tobacco: Never   Vaping Use   • Vaping Use: Never used   Substance and Sexual Activity   • Alcohol use:  Yes   • Drug use: No   • Sexual activity: None   Other Topics Concern   • None   Social History Narrative   • None     Social Determinants of Health     Financial Resource Strain: Not on file   Food Insecurity: Not on file   Transportation Needs: Not on file   Physical Activity: Not on file   Stress: Not on file   Social Connections: Not on file   Intimate Partner Violence: Not on file   Housing Stability: Not on file      Family History:     Family History   Problem Relation Age of Onset   • Hypertension Mother    • Uterine cancer Mother 61   • Arrhythmia Mother         has defibrillator   • Heart disease Father    • No Known Problems Sister    • No Known Problems Daughter    • No Known Problems Daughter    • No Known Problems Maternal Grandmother    • Heart attack Maternal Grandfather    • No Known Problems Paternal Grandmother    • Heart attack Paternal Grandfather    • Heart attack Maternal Aunt         76years old   • Heart disease Maternal Aunt    • No Known Problems Paternal Aunt    • No Known Problems Paternal Aunt    • No Known Problems Brother    • Colon polyps Neg Hx    • Colon cancer Neg Hx    • Hyperlipidemia Neg Hx    • Thyroid disease Neg Hx    • Stroke Neg Hx       Current Medications:     Current Outpatient Medications   Medication Sig Dispense Refill   • cetirizine (ZyrTEC) 10 mg tablet Take 10 mg by mouth daily     • Cholecalciferol (VITAMIN D) 2000 units CAPS Take 1 capsule by mouth daily     • fluticasone (FLONASE) 50 mcg/act nasal spray 1 spray into each nostril daily     • lisdexamfetamine (VYVANSE) 60 MG capsule Take 1 capsule (60 mg total) by mouth every morning Max Daily Amount: 60 mg 30 capsule 0   • Multiple Vitamin (multivitamin) tablet Take 1 tablet by mouth daily     • Probiotic Product (PROBIOTIC DAILY PO) Take by mouth     • triamcinolone (KENALOG) 0.1 % cream Apply topically 2 (two) times a day 30 g 0   • ZINC-VITAMIN C PO Take by mouth     • Insulin Pen Needle 32G X 4 MM MISC Use daily With saxenda (Patient not taking: Reported on 7/18/2023) 100 each 0   • liraglutide (SAXENDA) injection Inject subcutaneously WEEK 1 use 0.6mg day,  WEEK 2 use 1.2mg day, WEEK 3 use 1.8mg day, WEEK 4 use 2.4mg day, WEEK 5 use 3mg day (Patient not taking: Reported on 7/18/2023) 15 mL 1     No current facility-administered medications for this visit. Allergies:     No Known Allergies   Physical Exam:     /72 (BP Location: Left arm, Patient Position: Sitting, Cuff Size: Standard)   Pulse 74   Temp 97.6 °F (36.4 °C) (Tympanic)   Ht 5' 11" (1.803 m)   Wt 111 kg (244 lb 12.8 oz)   LMP 01/01/2019 (Approximate)   BMI 34.14 kg/m²     Physical Exam  Vitals reviewed. Constitutional:       Appearance: Normal appearance. HENT:      Head: Normocephalic and atraumatic. Right Ear: Tympanic membrane, ear canal and external ear normal.      Left Ear: Tympanic membrane, ear canal and external ear normal.      Nose: Nose normal.      Mouth/Throat:      Mouth: Mucous membranes are moist.      Pharynx: Oropharynx is clear. Eyes:      Conjunctiva/sclera: Conjunctivae normal.      Pupils: Pupils are equal, round, and reactive to light. Neck:      Thyroid: No thyromegaly. Cardiovascular:      Rate and Rhythm: Normal rate and regular rhythm. Heart sounds: Normal heart sounds. No murmur heard. Pulmonary:      Effort: Pulmonary effort is normal.      Breath sounds: Normal breath sounds. Abdominal:      General: Abdomen is flat. Bowel sounds are normal.      Palpations: Abdomen is soft. There is no hepatomegaly or splenomegaly. Tenderness: There is no abdominal tenderness.    Musculoskeletal:         General: Normal range of motion. Cervical back: Normal range of motion and neck supple. Skin:     General: Skin is warm and dry. Capillary Refill: Capillary refill takes less than 2 seconds. Neurological:      General: No focal deficit present. Mental Status: She is alert and oriented to person, place, and time. Psychiatric:         Mood and Affect: Mood normal.         Behavior: Behavior normal.         Thought Content:  Thought content normal.         Judgment: Judgment normal.          Ja Lugo, [address]

## 2023-07-18 NOTE — PATIENT INSTRUCTIONS
Wellness Visit for Adults   AMBULATORY CARE:   A wellness visit  is when you see your healthcare provider to get screened for health problems. Your healthcare provider will also give you advice on how to stay healthy. Write down your questions so you remember to ask them. Ask your healthcare provider how often you should have a wellness visit. What happens at a wellness visit:  Your healthcare provider will ask about your health, and your family history of health problems. This includes high blood pressure, heart disease, and cancer. He or she will ask if you have symptoms that concern you, if you smoke, and about your mood. You may also be asked about your intake of medicines, supplements, food, and alcohol. Any of the following may be done:  • Your weight  will be checked. Your height may also be checked so your body mass index (BMI) can be calculated. Your BMI shows if you are at a healthy weight. • Your blood pressure  and heart rate will be checked. Your temperature may also be checked. • Blood and urine tests  may be done. Blood tests may be done to check your cholesterol levels. Abnormal cholesterol levels increase your risk for heart disease and stroke. You may also need a blood or urine test to check for diabetes if you are at increased risk. Urine tests may be done to look for signs of an infection or kidney disease. • A physical exam  includes checking your heartbeat and lungs with a stethoscope. Your healthcare provider may also check your skin to look for sun damage. • Screening tests  may be recommended. A screening test is done to check for diseases that may not cause symptoms. The screening tests you may need depend on your age, gender, family history, and lifestyle habits. For example, colorectal screening may be recommended if you are 48years old or older. Screening tests you need if you are a woman:   • A Pap smear  is used to screen for cervical cancer.  Pap smears are usually done every 3 to 5 years depending on your age. You may need them more often if you have had abnormal Pap smear test results in the past. Ask your healthcare provider how often you should have a Pap smear. • A mammogram  is an x-ray of your breasts to screen for breast cancer. Experts recommend mammograms every 2 years starting at age 48 years. You may need a mammogram at age 52 years or younger if you have an increased risk for breast cancer. Talk to your healthcare provider about when you should start having mammograms and how often you need them. Vaccines you may need:   • Get an influenza vaccine  every year. The influenza vaccine protects you from the flu. Several types of viruses cause the flu. The viruses change over time, so new vaccines are made each year. • Get a tetanus-diphtheria (Td) booster vaccine  every 10 years. This vaccine protects you against tetanus and diphtheria. Tetanus is a severe infection that may cause painful muscle spasms and lockjaw. Diphtheria is a severe bacterial infection that causes a thick covering in the back of your mouth and throat. • Get a human papillomavirus (HPV) vaccine  if you are female and aged 23 to 32 or male 23 to 24 and never received it. This vaccine protects you from HPV infection. HPV is the most common infection spread by sexual contact. HPV may also cause vaginal, penile, and anal cancers. • Get a pneumococcal vaccine  if you are aged 72 years or older. The pneumococcal vaccine is an injection given to protect you from pneumococcal disease. Pneumococcal disease is an infection caused by pneumococcal bacteria. The infection may cause pneumonia, meningitis, or an ear infection. • Get a shingles vaccine  if you are 60 or older, even if you have had shingles before. The shingles vaccine is an injection to protect you from the varicella-zoster virus. This is the same virus that causes chickenpox.  Shingles is a painful rash that develops in people who had chickenpox or have been exposed to the virus. How to eat healthy:  My Plate is a model for planning healthy meals. It shows the types and amounts of foods that should go on your plate. Fruits and vegetables make up about half of your plate, and grains and protein make up the other half. A serving of dairy is included on the side of your plate. The amount of calories and serving sizes you need depends on your age, gender, weight, and height. Examples of healthy foods are listed below:  • Eat a variety of vegetables  such as dark green, red, and orange vegetables. You can also include canned vegetables low in sodium (salt) and frozen vegetables without added butter or sauces. • Eat a variety of fresh fruits , canned fruit in 100% juice, frozen fruit, and dried fruit. • Include whole grains. At least half of the grains you eat should be whole grains. Examples include whole-wheat bread, wheat pasta, brown rice, and whole-grain cereals such as oatmeal.    • Eat a variety of protein foods such as seafood (fish and shellfish), lean meat, and poultry without skin (turkey and chicken). Examples of lean meats include pork leg, shoulder, or tenderloin, and beef round, sirloin, tenderloin, and extra lean ground beef. Other protein foods include eggs and egg substitutes, beans, peas, soy products, nuts, and seeds. • Choose low-fat dairy products such as skim or 1% milk or low-fat yogurt, cheese, and cottage cheese. • Limit unhealthy fats  such as butter, hard margarine, and shortening. Exercise:  Exercise at least 30 minutes per day on most days of the week. Some examples of exercise include walking, biking, dancing, and swimming. You can also fit in more physical activity by taking the stairs instead of the elevator or parking farther away from stores. Include muscle strengthening activities 2 days each week. Regular exercise provides many health benefits.  It helps you manage your weight, and decreases your risk for type 2 diabetes, heart disease, stroke, and high blood pressure. Exercise can also help improve your mood. Ask your healthcare provider about the best exercise plan for you. General health and safety guidelines:   • Do not smoke. Nicotine and other chemicals in cigarettes and cigars can cause lung damage. Ask your healthcare provider for information if you currently smoke and need help to quit. E-cigarettes or smokeless tobacco still contain nicotine. Talk to your healthcare provider before you use these products. • Limit alcohol. A drink of alcohol is 12 ounces of beer, 5 ounces of wine, or 1½ ounces of liquor. • Lose weight, if needed. Being overweight increases your risk of certain health conditions. These include heart disease, high blood pressure, type 2 diabetes, and certain types of cancer. • Protect your skin. Do not sunbathe or use tanning beds. Use sunscreen with a SPF 15 or higher. Apply sunscreen at least 15 minutes before you go outside. Reapply sunscreen every 2 hours. Wear protective clothing, hats, and sunglasses when you are outside. • Drive safely. Always wear your seatbelt. Make sure everyone in your car wears a seatbelt. A seatbelt can save your life if you are in an accident. Do not use your cell phone when you are driving. This could distract you and cause an accident. Pull over if you need to make a call or send a text message. • Practice safe sex. Use latex condoms if are sexually active and have more than one partner. Your healthcare provider may recommend screening tests for sexually transmitted infections (STIs). • Wear helmets, lifejackets, and protective gear. Always wear a helmet when you ride a bike or motorcycle, go skiing, or play sports that could cause a head injury. Wear protective equipment when you play sports. Wear a lifejacket when you are on a boat or doing water sports.     © Copyright [employer] 2022 Information is for End User's use only and may not be sold, redistributed or otherwise used for commercial purposes. The above information is an  only. It is not intended as medical advice for individual conditions or treatments. Talk to your doctor, nurse or pharmacist before following any medical regimen to see if it is safe and effective for you. Obesity   AMBULATORY CARE:   Obesity  means your body mass index (BMI) is greater than 30. Your healthcare provider will use your age, height, and weight to measure your BMI. The risks of obesity include  many health problems, including injuries or physical disability. • Diabetes (high blood sugar level)    • High blood pressure or high cholesterol    • Heart disease    • Stroke    • Gallbladder or liver disease    • Cancer of the colon, breast, prostate, liver, or kidney    • Sleep apnea    • Arthritis or gout    Screening  is done to check for health conditions before you have signs or symptoms. If you are 28to 79years old, your blood sugar level may be checked every 3 years for signs of prediabetes or diabetes. Your healthcare provider will check your blood pressure at each visit. High blood pressure can lead to a stroke or other problems. Your provider may check for signs of heart disease, cancer, or other health problems. Seek care immediately if:   • You have a severe headache, confusion, or difficulty speaking. • You have weakness on one side of your body. • You have chest pain, sweating, or shortness of breath. Call your doctor if:   • You have symptoms of gallbladder or liver disease, such as pain in your upper abdomen. • You have knee or hip pain and discomfort while walking. • You have symptoms of diabetes, such as intense hunger and thirst, and frequent urination. • You have symptoms of sleep apnea, such as snoring or daytime sleepiness. • You have questions or concerns about your condition or care.     Treatment for obesity  focuses on helping you lose weight to improve your health. Even a small decrease in BMI can reduce the risk for many health problems. Your healthcare provider will help you set a weight-loss goal.  • Lifestyle changes  are the first step in treating obesity. These include making healthy food choices and getting regular physical activity. Your healthcare provider may suggest a weight-loss program that involves coaching, education, and therapy. • Medicine  may help you lose weight when it is used with a healthy foods and physical activity. • Surgery  can help you lose weight if you have obesity along with other health problems. Several types of weight-loss surgery are available. Ask your healthcare provider for more information. Tips for safe weight loss:   • Set small, realistic goals. An example of a small goal is to walk for 20 minutes 5 days a week. Anther goal is to lose 5% of your body weight. • Ask for support. Tell friends, family members, and coworkers about your goals. Ask someone to lose weight with you. You may also want to join a weight-loss support group. • Identify foods or triggers that may cause you to overeat. Remove tempting high-calorie foods from your home and workplace. Place a bowl of fresh fruit on your kitchen counter. If stress causes you to eat, find other ways to cope with stress. A counselor or therapist may be able to help you. • Track your daily calories and activity. Write down what you eat and drink. Also write down how many minutes of physical activity you do each day. • Track your weekly weight. Weigh yourself in the morning, before you eat or drink anything but after you use the bathroom. Use the same scale, in the same place, and in similar clothing each time. Only weigh yourself 1 to 2 times each week, or as directed. You may become discouraged if you weigh yourself every day. Eating changes:   You will need to eat 500 to 1,000 fewer calories each day than you currently eat to lose 1 to 2 pounds a week. The following changes will help you cut calories:  • Eat smaller portions. Use small plates, no larger than 9 inches in diameter. Fill your plate half full of fruits and vegetables. Measure your food using measuring cups until you know what a serving size looks like. • Eat 3 meals and 1 or 2 snacks each day. Plan your meals in advance. Troy Dienes and eat at home most of the time. Eat slowly. Do not skip meals. Skipping meals can lead to overeating later in the day. This can make it harder for you to lose weight. Talk with a dietitian to help you make a meal plan and schedule that is right for you. • Eat fruits and vegetables at every meal.  They are low in calories and high in fiber, which makes you feel full. Do not add butter, margarine, or cream sauce to vegetables. Use herbs to season steamed vegetables. • Eat less fat and fewer fried foods. Eat more baked or grilled chicken and fish. These protein sources are lower in calories and fat than red meat. Limit fast food. Dress your salads with olive oil and vinegar instead of bottled dressing. • Limit the amount of sugar you eat. Do not drink sugary beverages. Limit alcohol. Activity changes:  Physical activity is good for your body in many ways. It helps you burn calories and build strong muscles. It decreases stress and depression, and improves your mood. It can also help you sleep better. Talk to your healthcare provider before you begin an exercise program.  • Exercise for at least 30 minutes 5 days a week. Start slowly. Set aside time each day for physical activity that you enjoy and that is convenient for you. It is best to do both weight training and an activity that increases your heart rate, such as walking, bicycling, or swimming. • Find ways to be more active. Do yard work and housecleaning. Walk up the stairs instead of using elevators.  Spend your leisure time going to events that require walking, such as outdoor festivals or fairs. This extra physical activity can help you lose weight and keep it off. Follow up with your doctor as directed: You may need to meet with a dietitian. Write down your questions so you remember to ask them during your visits. © Copyright [facility] 2022 Information is for End User's use only and may not be sold, redistributed or otherwise used for commercial purposes. The above information is an  only. It is not intended as medical advice for individual conditions or treatments. Talk to your doctor, nurse or pharmacist before following any medical regimen to see if it is safe and effective for you.

## 2023-07-18 NOTE — ASSESSMENT & PLAN NOTE
Symptoms well controlled with Vyvanse at 60 mg. Would like to continue on this dose. Call if less effective. F/U in 1 year.

## 2023-08-01 ENCOUNTER — OFFICE VISIT (OUTPATIENT)
Dept: SLEEP CENTER | Facility: HOSPITAL | Age: 53
End: 2023-08-01
Payer: COMMERCIAL

## 2023-08-01 VITALS
BODY MASS INDEX: 32.78 KG/M2 | HEIGHT: 72 IN | WEIGHT: 242 LBS | HEART RATE: 73 BPM | OXYGEN SATURATION: 95 % | DIASTOLIC BLOOD PRESSURE: 86 MMHG | SYSTOLIC BLOOD PRESSURE: 120 MMHG

## 2023-08-01 DIAGNOSIS — G47.33 OSA (OBSTRUCTIVE SLEEP APNEA): Primary | ICD-10-CM

## 2023-08-01 DIAGNOSIS — E66.9 OBESITY (BMI 30-39.9): ICD-10-CM

## 2023-08-01 DIAGNOSIS — G47.9 SLEEP DISTURBANCE: ICD-10-CM

## 2023-08-01 DIAGNOSIS — F98.8 ATTENTION DEFICIT DISORDER, UNSPECIFIED HYPERACTIVITY PRESENCE: ICD-10-CM

## 2023-08-01 DIAGNOSIS — R53.83 FATIGUE, UNSPECIFIED TYPE: ICD-10-CM

## 2023-08-01 DIAGNOSIS — F45.8 BRUXISM: ICD-10-CM

## 2023-08-01 PROCEDURE — 99214 OFFICE O/P EST MOD 30 MIN: CPT | Performed by: INTERNAL MEDICINE

## 2023-08-01 NOTE — PROGRESS NOTES
Follow-Up Note - Sleep Center   Albino Stearns  46 y.o. female  :1970  LFT:[id number]  DOS:2023    CC: I saw this patient for follow-up in clinic today for Sleep disordered breathing, Coexisting Sleep and Medical Problems. Interval changes: She is using a ResMed machine. She was last seen in 2022    The study demonstrated : XIAO (respiratory event index of) 21 /hour. Minimum oxygen saturation 83%  and 0.6% of total sleep time was spent with saturations less than 90%. The snore index was 44.7%. PFSH, Problem List, Medications & Allergies were reviewed in EMR. She  has a past medical history of Class 2 severe obesity due to excess calories with serious comorbidity and body mass index (BMI) of 35.0 to 35.9 in [facility]), Hemochromatosis, Hereditary hemochromatosis ([address]) (2018), and IBS (irritable bowel syndrome). She has a current medication list which includes the following prescription(s): cetirizine, vitamin d, fluticasone, insulin pen needle, liraglutide, lisdexamfetamine, multivitamin, probiotic product, triamcinolone, and zinc-vitamin c. PHYSIOLOGICAL DATA REVIEW : Discontinued use of PAP.;  Patient has not been using non FDA approved devices to sanitize the machine. For few days when she used the machine in May/2022, respiratory event index was 1.3/h at 95th percentile pressure of 13.4 cm H2O. INTERPRETATION: Compliance is non- compliant; Pressure setting is:adequate; ;   SUBJECTIVE: With respect to use of PAP, Joslyn Jensen  is experiencing significant adverse effects:dry mouth/throat, mask leaks  and mask discomfort. She derives benefit. Is satisfied with sleep and daytime function. Sleep Routine: Joslyn Jensen reports getting 7 hrs sleep on workdays and more on days off; she has no difficulty initiating, but reports diffculty maintaining sleep . She arises needing an alarm and is not always refreshed. Joslyn Jensen reports episodic excessive daytime sleepiness,.  She rated [herself] at Total score: 5 /24 on the [place] Sleepiness Scale. Other issues: Not aware of teeth grinding during sleep. .     Habits:[ reports that she quit smoking about 20 years ago. Her smoking use included cigarettes. She has a 1.00 pack-year smoking history. She has never used smokeless tobacco.], [ reports current alcohol use.], [ reports no history of drug use.], Caffeine use:none; Exercise routine: regular. ROS: Significant for around 35 pounds weight loss since her initial study. She is on Saxenda for weight reduction. .  Allergies are controlled. He reported no respiratory or cardiac symptoms. She is not awakening with headache. She is on Vyvanse for ADHD. [facility] /86 (BP Location: Left arm, Patient Position: Sitting, Cuff Size: Standard)   Pulse 73   Ht 6' (1.829 m)   Wt 110 kg (242 lb)   LMP 01/01/2019 (Approximate)   SpO2 95%   BMI 32.82 kg/m²     Wt Readings from Last 3 Encounters:   08/01/23 110 kg (242 lb)   07/18/23 111 kg (244 lb 12.8 oz)   07/11/23 110 kg (243 lb 3.2 oz)      Patient is well groomed; well appearing. CNS: Alert, orientated, speech clear & coherent  Psych: cooperative and in no distress. Mental state: Appears normal.  H&N: EOMI; NC/AT: No facial pressure marks, no rashes. Skin/Extrem: col & hydration normal; no edema  Resp: Respiratory effort is normal  Physical findings otherwise essentially unchanged from previous. IMPRESSION: Problem List Items & Comorbidities Addressed this Visit    1. HUMPHREY (obstructive sleep apnea)  PAP DME Resupply/Reorder      2. Sleep disturbance        3. Fatigue, unspecified type        4. Bruxism        5. Attention deficit disorder, unspecified hyperactivity presence        6. Obesity (BMI 30-39. 9)            PLAN:  1. I reviewed results of prior studies and physiologic data with the patient. 2. I discussed treatment options with risks and benefits.   3. Treatment with  PAP is medically necessary and Curt Harrington elected to reinitiate use. 4. Care of equipment, methods to improve comfort using PAP and importance of compliance with therapy were discussed. 5. Pressure setting: continue 5-15 cmH2O.    6. Rx provided to replace supplies and Care coordinated with DME provider for refitting of her interface. 7. Encouraged to persist with strategies for weight reduction. A repeat diagnostic study can be undertaken when she is at her target of around 200 pounds. 8. Follow-up is advised in 1 year or sooner if needed to monitor progress, compliance and to adjust therapy. Thank you for allowing me to participate in the care of this patient. Sincerely,     Authenticated electronically on 97/31/53   Board Certified Specialist     Portions of the record may have been created with voice recognition software. Occasional wrong word or "sound a like" substitutions may have occurred due to the inherent limitations of voice recognition software. There may also be notations and random deletions of words or characters from malfunctioning software. Read the chart carefully and recognize, using context, where substitutions/deletions have occurred.

## 2023-08-02 ENCOUNTER — TELEPHONE (OUTPATIENT)
Dept: SLEEP CENTER | Facility: CLINIC | Age: 53
End: 2023-08-02

## 2023-08-03 DIAGNOSIS — F98.8 ATTENTION DEFICIT DISORDER, UNSPECIFIED HYPERACTIVITY PRESENCE: ICD-10-CM

## 2023-08-03 LAB

## 2023-08-10 DIAGNOSIS — F98.8 ATTENTION DEFICIT DISORDER, UNSPECIFIED HYPERACTIVITY PRESENCE: ICD-10-CM

## 2023-08-10 NOTE — TELEPHONE ENCOUNTER
[employer] does not have in stock; please send to [employer] in [place]

## 2023-08-16 NOTE — PROGRESS NOTES
Weight Management Medical Nutrition Assessment   Is here for meal planning. Current wt:  234.6   lbs. She has  Lost 8.6  lbs x just over 1 month with overall loss of  38.8  Lbs since June 2022. At time of visit with KANDACE eli was ordered. Also continues on vyvanse. Notices improvement to appetite, grazing improved. Some issues with constipation, started taking miralax and today large results. Does not constipation has been prevalent since starting the saxenda (does have issues long term as well). Suggest add flax seed, different high fiber fruits and probiotic to see if this helps. Hydration is adequate. Has nurse visit med check today and will let them know as well. She will f/u in ~6 wks.       Patient seen by Medical Provider in past 6 months:  no  Requested to schedule appointment with Medical Provider: No    Anthropometric Measurements  Start Weight (#): 273.4 lbs 6/29/22    Current Weight (#): 234.6 lbs   TBW % Change from start weight: 14.2%  Ideal Body Weight (#): 182.6 lbs BMI 25 (5'11.7" 157.5 lbs)  Goal Weight (#): 200 lbs  Highest:  Lowest:    Weight Loss History  Previous weight loss attempts: Commercial Programs (Weight Watchers, Pierce Ripper, etc.)  Exercise  Self Created Diets (Portion Control, Healthy Food Choices, etc.)    Food and Nutrition Related History  Wake up: 4:30-5:30   Bed Time: 10    Food Recall  Snack: 6:00 Tristian's killer thin, 1 tbsp pb    Breakfast: 9-9:30 light & fit greek yogurt w/berries  Lunch: 1:00:  replacement, fruit, string cheese   Snack: 3:30  skip     Dinner: 7:00: ~6 oz chicken/pork/hamburger/steak, veggies/salad OR kielbasa, pierogies  Snack: bar     Beverages: water  Volume of beverage intake: 80 oz+    Weekends: Same  Cravings: none identified   Trouble area of day: after work    Frequency of Eating out:  Varies   Food restrictions:  n/a  Cooking: self   Food Shopping: self    Physical Activity Intake  Activity: walking, fitness  workouts   Frequency:5x/wk 3 mile walk, 2-3x/wk fitness   Physical limitations/barriers to exercise: n/a    Estimated Needs  Energy  [employer] Needs: BMR : 5721   1-2# loss weekly sedentary: 7030-7047             1-2# loss weekly lightly active: 0630-8352  Maintenance calories for sedentary activity level: 2126  Protein:  gm      (1.2-1.5g/kg IBW)  Fluid:  84 oz      (35mL/kg IBW)    Nutrition Diagnosis  Yes; Overweight/obesity  related to Excess energy intake as evidenced by  BMI more than normative standard for age and sex (obesity-grade I 32-30. 9)       Nutrition Intervention    Nutrition Prescription  Calories: 3758-4329  Protein: 125-145 gm  Carb: 100-145 gm    Meal Plan (Rick/Pro/Carb)  Snack: 200-300, 10-15, 15-25  Breakfast: 300, 35, 20  Lunch: 350,  35, 20-30  Snack: 160, 15, 18  Dinner: 450, 30-35, 25-35  Snack: 0150, 0-10     Nutrition Education:    Calorie controlled meal plan  Food logging/measuring  Hydration  fiber  Physical activity     Nutrition Counseling:  Strategies: as above    Monitoring and Evaluation:  Evaluation criteria:  Energy Intake  Meet protein needs  Maintain adequate hydration  Monitor weekly weight  Physical activity   Calorie controlled meal plan  Healthy snacks  Food logging/measuring    Barriers to learning:none  Readiness to change: Action:  (Changing behavior)    Comprehension: very good  Expected Compliance: good

## 2023-08-17 ENCOUNTER — CLINICAL SUPPORT (OUTPATIENT)
Dept: BARIATRICS | Facility: CLINIC | Age: 53
End: 2023-08-17

## 2023-08-17 ENCOUNTER — OFFICE VISIT (OUTPATIENT)
Dept: BARIATRICS | Facility: CLINIC | Age: 53
End: 2023-08-17

## 2023-08-17 VITALS
BODY MASS INDEX: 31.77 KG/M2 | RESPIRATION RATE: 16 BRPM | SYSTOLIC BLOOD PRESSURE: 100 MMHG | HEART RATE: 71 BPM | HEIGHT: 72 IN | DIASTOLIC BLOOD PRESSURE: 70 MMHG | WEIGHT: 234.6 LBS

## 2023-08-17 DIAGNOSIS — R63.5 ABNORMAL WEIGHT GAIN: ICD-10-CM

## 2023-08-17 DIAGNOSIS — R63.5 ABNORMAL WEIGHT GAIN: Primary | ICD-10-CM

## 2023-08-17 PROCEDURE — RECHECK

## 2023-08-17 PROCEDURE — WMDI30

## 2023-08-17 NOTE — PROGRESS NOTES
Patient last visit weight:  Patient current visit weight:    If you are taking phentermine or other oral weight loss medications, are you experiencing any of the following symptoms:  Headache:   Blurred Vision:   Chest Pain:   Palpitations: Insomnia:   SPECIFY ORAL MEDICATION AND DOSAGE:     If you are taking an injectable medication,  are you experiencing any of the following symptoms:  Bloating: no  Nausea:no  Vomiting: no  Constipation: yes  Diarrhea:no  SPECIFY INJECTABLE MEDICATION AND CURRENT DOSAGE: Saxenda      Vitals:    - Is BP less than 100/60? no  - Is BP greater than 140/90? no  - Is HR greater than 100?no  **If yes to any of the above, have patient relax and repeat in 5-10 minutes**    Repeat values:    - Is BP less than 100/60?  - Is BP greater than 140/90?  - Is HR greater than 100?   **If values remain outside of ranges above, please consult provider for next steps**

## 2023-08-17 NOTE — Clinical Note
Patient is experiencing constipation but does not want to change her dose of 2.4mg. She states she will change her diet and add a probiotic.

## 2023-09-05 DIAGNOSIS — F98.8 ATTENTION DEFICIT DISORDER, UNSPECIFIED HYPERACTIVITY PRESENCE: ICD-10-CM

## 2023-09-07 DIAGNOSIS — F98.8 ATTENTION DEFICIT DISORDER, UNSPECIFIED HYPERACTIVITY PRESENCE: ICD-10-CM

## 2023-09-07 NOTE — TELEPHONE ENCOUNTER
Patient needs lisdexamfetamine (VYVANSE) sent to   She said the generic needs to be sent to   [facility] at   [address] [place] [address], [address] [place], [place] [address]tucker Alas this is the only pharmacy that has this

## 2023-09-28 NOTE — PROGRESS NOTES
Weight Management Medical Nutrition Assessment   Is here for meal planning. Current wt: 240.8    lbs. She has gained 6.2   lbs x ~6 wks with overall loss of 32.6   Lbs since June 2022. She was able to use the saxenda up to the maintenance dose but has not been able to get it refilled since. Has been off for about a month. Pharmacy list provided for alternatives. Does notice increased cravings, harder in the evening. Does skip afternoon snack at times. Recommend she monitor this to see if when she skips it is causing more cravings at night. Also recommend she reach out to PA to discuss medication alternatives if cravings persist. She will f/u with PA in ~6 wks and myself in December.        Patient seen by Medical Provider in past 6 months:  no  Requested to schedule appointment with Medical Provider: No    Anthropometric Measurements  Start Weight (#): 273.4 lbs 6/29/22    Current Weight (#): 240.8 lbs   TBW % Change from start weight: 11.9%  Ideal Body Weight (#): 182.6 lbs BMI 25 (5'11.7" 157.5 lbs)  Goal Weight (#): 200 lbs  Highest:  Lowest:    Weight Loss History  Previous weight loss attempts: Commercial Programs ([employer], Ronal[employer], etc.)  Exercise  Self Created Diets (Portion Control, Healthy Food Choices, etc.)    Food and Nutrition Related History  Wake up: 4:30-5:30   Bed Time: 10    Food Recall  Snack: 6:00 Tristian's killer thin, 1 tbsp pb    Breakfast: 9-9:30 light & fit greek yogurt w/berries, flax seed  Lunch: 1:00:  replacement, fruit, string cheese   Snack: 3:30  skip OR bar    Dinner: 6:00-7:00: ~6 oz chicken/pork/hamburger/steak, veggies/salad, small amount of carb  Snacks: cookies    Beverages: water  Volume of beverage intake: 80 oz+    Weekends: Same  Cravings: none identified   Trouble area of day: night    Frequency of Eating out:  Varies   Food restrictions:  n/a  Cooking: self   Food Shopping: self    Physical Activity Intake  Activity: walking, fitness  workouts Frequency:5x/wk 3 mile walk, 2-3x/wk fitness   Physical limitations/barriers to exercise: n/a    Estimated Needs  Energy  [employer] Needs: BMR : 7102   1-2# loss weekly sedentary: 7529-4687             1-2# loss weekly lightly active: 2611-3984  Maintenance calories for sedentary activity level: 2156  Protein:  gm      (1.2-1.5g/kg IBW)  Fluid:  84 oz      (35mL/kg IBW)    Nutrition Diagnosis  Yes; Overweight/obesity  related to Excess energy intake as evidenced by  BMI more than normative standard for age and sex (obesity-grade I 32-30. 9)       Nutrition Intervention    Nutrition Prescription  Calories: 0255-9891  Protein: 125-145 gm  Carb: 100-145 gm    Meal Plan (Rick/Pro/Carb)  Snack: 200-300, 10-15, 15-25  Breakfast: 300, 35, 20  Lunch: 350,  35, 20-30  Snack: 160, 15, 18  Dinner: 450, 30-35, 25-35  Snack: 0150, 0-10     Nutrition Education:    Calorie controlled meal plan  Food logging/measuring  Hydration  fiber  Physical activity     Nutrition Counseling:  Strategies: as above    Monitoring and Evaluation:  Evaluation criteria:  Energy Intake  Meet protein needs  Maintain adequate hydration  Monitor weekly weight  Physical activity   Calorie controlled meal plan  Healthy snacks  Food logging/measuring    Barriers to learning:none  Readiness to change: Action:  (Changing behavior)    Comprehension: very good  Expected Compliance: good

## 2023-10-04 ENCOUNTER — OFFICE VISIT (OUTPATIENT)
Dept: BARIATRICS | Facility: CLINIC | Age: 53
End: 2023-10-04

## 2023-10-04 VITALS — HEIGHT: 72 IN | WEIGHT: 240.8 LBS | BODY MASS INDEX: 32.61 KG/M2

## 2023-10-04 DIAGNOSIS — R63.5 ABNORMAL WEIGHT GAIN: ICD-10-CM

## 2023-10-04 PROCEDURE — WMDI30

## 2023-10-04 PROCEDURE — RECHECK

## 2023-10-06 DIAGNOSIS — F98.8 ATTENTION DEFICIT DISORDER, UNSPECIFIED HYPERACTIVITY PRESENCE: ICD-10-CM

## 2023-10-06 RX ORDER — LISDEXAMFETAMINE DIMESYLATE CAPSULES 60 MG/1
60 CAPSULE ORAL EVERY MORNING
Qty: 30 CAPSULE | Refills: 0 | Status: SHIPPED | OUTPATIENT
Start: 2023-10-06

## 2023-10-08 RX ORDER — LISDEXAMFETAMINE DIMESYLATE CAPSULES 60 MG/1
60 CAPSULE ORAL EVERY MORNING
Qty: 30 CAPSULE | Refills: 0 | OUTPATIENT
Start: 2023-10-08

## 2023-11-03 ENCOUNTER — TELEPHONE (OUTPATIENT)
Dept: BARIATRICS | Facility: CLINIC | Age: 53
End: 2023-11-03

## 2023-11-03 DIAGNOSIS — E66.9 OBESITY, CLASS I, BMI 30-34.9: ICD-10-CM

## 2023-11-06 DIAGNOSIS — F98.8 ATTENTION DEFICIT DISORDER, UNSPECIFIED HYPERACTIVITY PRESENCE: ICD-10-CM

## 2023-11-06 RX ORDER — LISDEXAMFETAMINE DIMESYLATE CAPSULES 60 MG/1
60 CAPSULE ORAL EVERY MORNING
Qty: 30 CAPSULE | Refills: 0 | Status: SHIPPED | OUTPATIENT
Start: 2023-11-06

## 2023-11-07 ENCOUNTER — APPOINTMENT (OUTPATIENT)
Dept: LAB | Facility: HOSPITAL | Age: 53
End: 2023-11-07
Payer: COMMERCIAL

## 2023-11-07 DIAGNOSIS — E83.110 HEREDITARY HEMOCHROMATOSIS (HCC): ICD-10-CM

## 2023-11-07 DIAGNOSIS — Z13.220 SCREENING CHOLESTEROL LEVEL: ICD-10-CM

## 2023-11-07 LAB
ALBUMIN SERPL BCP-MCNC: 3.9 G/DL (ref 3.5–5)
ALP SERPL-CCNC: 108 U/L (ref 34–104)
ALT SERPL W P-5'-P-CCNC: 17 U/L (ref 7–52)
ANION GAP SERPL CALCULATED.3IONS-SCNC: 7 MMOL/L
AST SERPL W P-5'-P-CCNC: 18 U/L (ref 13–39)
BASOPHILS # BLD AUTO: 0.01 THOUSANDS/ÂΜL (ref 0–0.1)
BASOPHILS NFR BLD AUTO: 0 % (ref 0–1)
BILIRUB SERPL-MCNC: 1.26 MG/DL (ref 0.2–1)
BUN SERPL-MCNC: 13 MG/DL (ref 5–25)
CALCIUM SERPL-MCNC: 9.5 MG/DL (ref 8.4–10.2)
CHLORIDE SERPL-SCNC: 103 MMOL/L (ref 96–108)
CHOLEST SERPL-MCNC: 146 MG/DL
CO2 SERPL-SCNC: 29 MMOL/L (ref 21–32)
CREAT SERPL-MCNC: 0.57 MG/DL (ref 0.6–1.3)
EOSINOPHIL # BLD AUTO: 0.2 THOUSAND/ÂΜL (ref 0–0.61)
EOSINOPHIL NFR BLD AUTO: 3 % (ref 0–6)
ERYTHROCYTE [DISTWIDTH] IN BLOOD BY AUTOMATED COUNT: 11.9 % (ref 11.6–15.1)
FERRITIN SERPL-MCNC: 57 NG/ML (ref 11–307)
GFR SERPL CREATININE-BSD FRML MDRD: 105 ML/MIN/1.73SQ M
GLUCOSE P FAST SERPL-MCNC: 95 MG/DL (ref 65–99)
HCT VFR BLD AUTO: 45.2 % (ref 34.8–46.1)
HDLC SERPL-MCNC: 54 MG/DL
HGB BLD-MCNC: 14.7 G/DL (ref 11.5–15.4)
IMM GRANULOCYTES # BLD AUTO: 0.01 THOUSAND/UL (ref 0–0.2)
IMM GRANULOCYTES NFR BLD AUTO: 0 % (ref 0–2)
IRON SATN MFR SERPL: 53 % (ref 15–50)
IRON SERPL-MCNC: 155 UG/DL (ref 50–212)
LDLC SERPL CALC-MCNC: 75 MG/DL (ref 0–100)
LYMPHOCYTES # BLD AUTO: 2.37 THOUSANDS/ÂΜL (ref 0.6–4.47)
LYMPHOCYTES NFR BLD AUTO: 37 % (ref 14–44)
MCH RBC QN AUTO: 31.4 PG (ref 26.8–34.3)
MCHC RBC AUTO-ENTMCNC: 32.5 G/DL (ref 31.4–37.4)
MCV RBC AUTO: 97 FL (ref 82–98)
MONOCYTES # BLD AUTO: 0.48 THOUSAND/ÂΜL (ref 0.17–1.22)
MONOCYTES NFR BLD AUTO: 8 % (ref 4–12)
NEUTROPHILS # BLD AUTO: 3.37 THOUSANDS/ÂΜL (ref 1.85–7.62)
NEUTS SEG NFR BLD AUTO: 52 % (ref 43–75)
NONHDLC SERPL-MCNC: 92 MG/DL
NRBC BLD AUTO-RTO: 0 /100 WBCS
PLATELET # BLD AUTO: 208 THOUSANDS/UL (ref 149–390)
PMV BLD AUTO: 10.9 FL (ref 8.9–12.7)
POTASSIUM SERPL-SCNC: 4.2 MMOL/L (ref 3.5–5.3)
PROT SERPL-MCNC: 7 G/DL (ref 6.4–8.4)
RBC # BLD AUTO: 4.68 MILLION/UL (ref 3.81–5.12)
SODIUM SERPL-SCNC: 139 MMOL/L (ref 135–147)
TIBC SERPL-MCNC: 291 UG/DL (ref 250–450)
TRIGL SERPL-MCNC: 84 MG/DL
UIBC SERPL-MCNC: 136 UG/DL (ref 155–355)
WBC # BLD AUTO: 6.44 THOUSAND/UL (ref 4.31–10.16)

## 2023-11-07 PROCEDURE — 83540 ASSAY OF IRON: CPT

## 2023-11-07 PROCEDURE — 82728 ASSAY OF FERRITIN: CPT

## 2023-11-07 PROCEDURE — 80053 COMPREHEN METABOLIC PANEL: CPT

## 2023-11-07 PROCEDURE — 80061 LIPID PANEL: CPT

## 2023-11-07 PROCEDURE — 83550 IRON BINDING TEST: CPT

## 2023-11-07 PROCEDURE — 85025 COMPLETE CBC W/AUTO DIFF WBC: CPT

## 2023-11-07 PROCEDURE — 36415 COLL VENOUS BLD VENIPUNCTURE: CPT

## 2023-11-10 NOTE — PROGRESS NOTES
HEMATOLOGY / [facility] FOLLOW UP NOTE    Primary Care Provider: NATHALIE Person  Referring Provider:    MRN: [id number]  : 1970    Reason for Encounter: Follow-up for hereditary hemochromatosis     Interval History: Patient returns for follow-up visit. Blood work completed prior to today's visit reviewed. CBC with differential is normal.  Iron panel shows serum iron 155, iron saturation 53%, serum ferritin 57. She continues to donate blood approximately every 58 days to [facility] blood bank without incident. She is due to donate. Feels well. Denies any concerns. REVIEW OF SYSTEMS:  Please note that a 14-point review of systems was performed to include Constitutional, HEENT, Respiratory, CVS, GI, , Musculoskeletal, Integumentary, Neurologic, Rheumatologic, Endocrinologic, Psychiatric, Lymphatic, and Hematologic/Oncologic systems were reviewed and are negative unless otherwise stated in HPI. Positive and negative findings pertinent to this evaluation are incorporated into the history of present illness. ECOG PS: 0    PROBLEM LIST:  Patient Active Problem List   Diagnosis    ADD (attention deficit disorder)    Allergic rhinitis    Hereditary hemochromatosis (HCC)    HUMPHREY (obstructive sleep apnea)    Obesity, Class I, BMI 30-34.9       Assessment / Plan:  1. Hereditary hemochromatosis ([address])      Recent blood work shows mildly elevated iron saturation. Serum ferritin 57. She is due to donate blood at [facility] blood bank and will do so. She will continue to donate on her regular schedule without any change. I will see her back in 6 months with repeat blood work. Patient is in agreement. She knows to call in time with questions or concerns    I spent 25 minutes on chart review, face to face counseling time, coordination of care and documentation.     Past Medical History:   has a past medical history of Class 2 severe obesity due to excess calories with serious comorbidity and body mass index (BMI) of 35.0 to 35.9 in adult [facility]), Hemochromatosis, Hereditary hemochromatosis ([address] ) (4/5/2018), and IBS (irritable bowel syndrome). PAST SURGICAL HISTORY:   has a past surgical history that includes Colonoscopy; Hysteroscopy w/ endometrial ablation; Nasal septum surgery; pr corrj hallux valgus w/sesmdc w/1metar medial cnf (Left, 03/19/2021); and Bunionectomy (Left, 03/2021). CURRENT MEDICATIONS  Current Outpatient Medications   Medication Sig Dispense Refill    cetirizine (ZyrTEC) 10 mg tablet Take 10 mg by mouth daily      Cholecalciferol (VITAMIN D) 2000 units CAPS Take 1 capsule by mouth daily      fluticasone (FLONASE) 50 mcg/act nasal spray 1 spray into each nostril daily      Insulin Pen Needle 32G X 4 MM MISC Use daily With saxenda 100 each 0    liraglutide (SAXENDA) injection Inject subcutaneously WEEK 1 use 0.6mg day,  WEEK 2 use 1.2mg day, WEEK 3 use 1.8mg day, WEEK 4 use 2.4mg day, WEEK 5 use 3mg day 15 mL 1    lisdexamfetamine (VYVANSE) 60 MG capsule Take 1 capsule (60 mg total) by mouth every morning Max Daily Amount: 60 mg 30 capsule 0    Multiple Vitamin (multivitamin) tablet Take 1 tablet by mouth daily      Probiotic Product (PROBIOTIC DAILY PO) Take by mouth      triamcinolone (KENALOG) 0.1 % cream Apply topically 2 (two) times a day 30 g 0    ZINC-VITAMIN C PO Take by mouth       No current facility-administered medications for this visit. [unfilled]    SOCIAL HISTORY:   reports that she quit smoking about 20 years ago. Her smoking use included cigarettes. She has a 1.00 pack-year smoking history. She has never used smokeless tobacco. She reports current alcohol use. She reports that she does not use drugs.      FAMILY HISTORY:  family history includes Arrhythmia in her mother; Heart attack in her maternal aunt, maternal grandfather, and paternal grandfather; Heart disease in her father, maternal aunt, mother, paternal grandfather, and paternal grandmother; Hypertension in her mother; No Known Problems in her brother, daughter, daughter, maternal grandmother, paternal aunt, paternal aunt, and sister; Uterine cancer (age of onset: 61) in her mother. ALLERGIES:  has No Known Allergies. Physical Exam:  Vital Signs:   Visit Vitals  LMP 01/01/2019 (Approximate)   OB Status Postmenopausal   Smoking Status Former     There is no height or weight on file to calculate BMI. There is no height or weight on file to calculate BSA. Physical Exam  Constitutional:       General: She is not in acute distress. Appearance: Normal appearance. HENT:      Head: Normocephalic and atraumatic. Eyes:      General: No scleral icterus. Right eye: No discharge. Left eye: No discharge. Conjunctiva/sclera: Conjunctivae normal.   Cardiovascular:      Rate and Rhythm: Normal rate and regular rhythm. Pulmonary:      Effort: Pulmonary effort is normal. No respiratory distress. Breath sounds: Normal breath sounds. Abdominal:      General: Bowel sounds are normal. There is no distension. Palpations: Abdomen is soft. There is no mass. Tenderness: There is no abdominal tenderness. Musculoskeletal:         General: Normal range of motion. Lymphadenopathy:      Cervical: No cervical adenopathy. Upper Body:      Right upper body: No supraclavicular, axillary or pectoral adenopathy. Left upper body: No supraclavicular, axillary or pectoral adenopathy. Skin:     General: Skin is warm and dry. Neurological:      General: No focal deficit present. Mental Status: She is alert and oriented to person, place, and time.    Psychiatric:         Mood and Affect: Mood normal.         Behavior: Behavior normal.         Labs:  Lab Results   Component Value Date    WBC 6.44 11/07/2023    HGB 14.7 11/07/2023    HCT 45.2 11/07/2023    MCV 97 11/07/2023     11/07/2023     Lab Results   Component Value Date     06/04/2014 SODIUM 139 11/07/2023    K 4.2 11/07/2023     11/07/2023    CO2 29 11/07/2023    ANIONGAP 1 (L) 06/04/2014    AGAP 7 11/07/2023    BUN 13 11/07/2023    CREATININE 0.57 (L) 11/07/2023    GLUC 102 (H) 06/28/2021    GLUF 95 11/07/2023    CALCIUM 9.5 11/07/2023    AST 18 11/07/2023    ALT 17 11/07/2023    ALKPHOS 108 (H) 11/07/2023    PROT 6.9 06/04/2014    TP 7.0 11/07/2023    BILITOT 1.0 06/04/2014    TBILI 1.26 (H) 11/07/2023    EGFR 105 11/07/2023

## 2023-11-13 ENCOUNTER — OFFICE VISIT (OUTPATIENT)
Age: 53
End: 2023-11-13
Payer: COMMERCIAL

## 2023-11-13 VITALS
DIASTOLIC BLOOD PRESSURE: 83 MMHG | BODY MASS INDEX: 34 KG/M2 | HEART RATE: 82 BPM | RESPIRATION RATE: 16 BRPM | TEMPERATURE: 97.9 F | SYSTOLIC BLOOD PRESSURE: 135 MMHG | OXYGEN SATURATION: 99 % | WEIGHT: 251 LBS | HEIGHT: 72 IN

## 2023-11-13 DIAGNOSIS — E83.110 HEREDITARY HEMOCHROMATOSIS (HCC): Primary | ICD-10-CM

## 2023-11-13 PROCEDURE — 99213 OFFICE O/P EST LOW 20 MIN: CPT | Performed by: NURSE PRACTITIONER

## 2023-11-15 ENCOUNTER — OFFICE VISIT (OUTPATIENT)
Dept: BARIATRICS | Facility: CLINIC | Age: 53
End: 2023-11-15
Payer: COMMERCIAL

## 2023-11-15 VITALS
DIASTOLIC BLOOD PRESSURE: 85 MMHG | HEART RATE: 81 BPM | WEIGHT: 249 LBS | SYSTOLIC BLOOD PRESSURE: 136 MMHG | RESPIRATION RATE: 16 BRPM | BODY MASS INDEX: 34.86 KG/M2 | HEIGHT: 71 IN

## 2023-11-15 DIAGNOSIS — E66.9 OBESITY, CLASS I, BMI 30-34.9: Primary | ICD-10-CM

## 2023-11-15 DIAGNOSIS — F98.8 ADD (ATTENTION DEFICIT DISORDER): ICD-10-CM

## 2023-11-15 PROCEDURE — 99214 OFFICE O/P EST MOD 30 MIN: CPT | Performed by: PHYSICIAN ASSISTANT

## 2023-11-15 NOTE — PROGRESS NOTES
Assessment/Plan:    Obesity, Class I, BMI 30-34.9  -Patient is pursuing conservative program with RD visits after VLCD   -weight currently not at goal    Initial:273.4  Last visit:243.2  Current: 249    Change:-24.4 total  Goal:under 200    Goals:  -continue with food tracking. Discussed trying to stay consistent with it.   -continue water intake  -continue daily walking. strategies to help with nighttime snacking discussed      Was on seaxenda and doing well until supply shortage. She would be interested in wegovy or zepbound when becoming available. Still would continue with saxenda but concerned about future supply shortages as well Patient denies personal and family history of MCT and MEN2 tumors. Patient denies personal history of pancreatitis. Seh was having constipation but did plan to increase dietary fiber and probiotic/prebiotic      ADD (attention deficit disorder)  On vyvanse        Follow up in approximately  4 months  with Non-Surgical Physician/Advanced Practitioner and has RD visit next month/        Diagnoses and all orders for this visit:    Obesity, Class I, BMI 30-34.9    ADD (attention deficit disorder)          Subjective:   Chief Complaint   Patient presents with    Follow-up     MWM 4m f/u;waist-42. 5in        Patient ID: Alisha Mckeon  is a 46 y.o. female with excess weight/obesity here to pursue weight managment. Patient is pursuing Conservative Program after VLCD. HPI  Here for MWM followup. She has had more problems with cravings at night. Was on saxenda and doing well but due to supply has had to stop it. She was having problems with constipation with saxenda.     Wt Readings from Last 10 Encounters:   11/15/23 113 kg (249 lb)   11/13/23 114 kg (251 lb)   10/04/23 109 kg (240 lb 12.8 oz)   08/17/23 106 kg (234 lb 9.6 oz)   08/01/23 110 kg (242 lb)   07/18/23 111 kg (244 lb 12.8 oz)   07/11/23 110 kg (243 lb 3.2 oz)   06/07/23 108 kg (238 lb 3.2 oz)   05/10/23 108 kg (237 lb 1.6 oz)   05/10/23 108 kg (238 lb)       Food logging:  Increased appetite/cravings:  Fruit/Vegetable servings:  Exercise:walking 3 miles 5 x week   Hydration:water more than 80 oz    Food Recall  Snack: 6:00 Tristian's killer thin, 1 tbsp pb    Breakfast: 9-9:30 light & fit greek yogurt w/berries, flax seed  Lunch: 1:00:  replacement, fruit, string cheese   Snack: 3:30  skip OR bar    S: sometimes while prepping dinner can be tortillas and guac  Dinner: 6:00-7:00: ~6 oz chicken/pork/hamburger/steak, veggies/salad, small amount of carb  Snacks: cookies but varies    Colonoscopy-Completed    The following portions of the patient's history were reviewed and updated as appropriate: She  has a past medical history of Class 2 severe obesity due to excess calories with serious comorbidity and body mass index (BMI) of 35.0 to 35.9 in adult, Hemochromatosis, Hereditary hemochromatosis ([address]) (4/5/2018), and IBS (irritable bowel syndrome). She   Patient Active Problem List    Diagnosis Date Noted    Obesity, Class I, BMI 30-34.9 09/12/2022    HUMPHREY (obstructive sleep apnea)     Hereditary hemochromatosis ([address]) 04/05/2018    ADD (attention deficit disorder) 05/07/2012    Allergic rhinitis 05/07/2012     She  has a past surgical history that includes Colonoscopy; Hysteroscopy w/ endometrial ablation; Nasal septum surgery; pr corrj hallux valgus w/sesmdc w/1metar medial cnf (Left, 03/19/2021); and Bunionectomy (Left, 03/2021). Her family history includes Arrhythmia in her mother; Heart attack in her maternal aunt, maternal grandfather, and paternal grandfather; Heart disease in her father, maternal aunt, mother, paternal grandfather, and paternal grandmother; Hypertension in her mother; No Known Problems in her brother, daughter, daughter, maternal grandmother, paternal aunt, paternal aunt, and sister; Uterine cancer (age of onset: 61) in her mother. She  reports that she quit smoking about 20 years ago.  Her smoking use included cigarettes. She has a 1.00 pack-year smoking history. She has never used smokeless tobacco. She reports current alcohol use. She reports that she does not use drugs. Current Outpatient Medications   Medication Sig Dispense Refill    cetirizine (ZyrTEC) 10 mg tablet Take 10 mg by mouth daily      Cholecalciferol (VITAMIN D) 2000 units CAPS Take 1 capsule by mouth daily      fluticasone (FLONASE) 50 mcg/act nasal spray 1 spray into each nostril daily      lisdexamfetamine (VYVANSE) 60 MG capsule Take 1 capsule (60 mg total) by mouth every morning Max Daily Amount: 60 mg 30 capsule 0    Multiple Vitamin (multivitamin) tablet Take 1 tablet by mouth daily      Probiotic Product (PROBIOTIC DAILY PO) Take by mouth      triamcinolone (KENALOG) 0.1 % cream Apply topically 2 (two) times a day 30 g 0    ZINC-VITAMIN C PO Take by mouth      Insulin Pen Needle 32G X 4 MM MISC Use daily With saxenda (Patient not taking: Reported on 11/15/2023) 100 each 0    liraglutide (SAXENDA) injection Inject subcutaneously WEEK 1 use 0.6mg day,  WEEK 2 use 1.2mg day, WEEK 3 use 1.8mg day, WEEK 4 use 2.4mg day, WEEK 5 use 3mg day (Patient not taking: Reported on 11/13/2023) 15 mL 1     No current facility-administered medications for this visit.      Current Outpatient Medications on File Prior to Visit   Medication Sig    cetirizine (ZyrTEC) 10 mg tablet Take 10 mg by mouth daily    Cholecalciferol (VITAMIN D) 2000 units CAPS Take 1 capsule by mouth daily    fluticasone (FLONASE) 50 mcg/act nasal spray 1 spray into each nostril daily    lisdexamfetamine (VYVANSE) 60 MG capsule Take 1 capsule (60 mg total) by mouth every morning Max Daily Amount: 60 mg    Multiple Vitamin (multivitamin) tablet Take 1 tablet by mouth daily    Probiotic Product (PROBIOTIC DAILY PO) Take by mouth    triamcinolone (KENALOG) 0.1 % cream Apply topically 2 (two) times a day    ZINC-VITAMIN C PO Take by mouth    Insulin Pen Needle 32G X 4 MM MISC Use daily With saxenda (Patient not taking: Reported on 11/15/2023)    liraglutide (SAXENDA) injection Inject subcutaneously WEEK 1 use 0.6mg day,  WEEK 2 use 1.2mg day, WEEK 3 use 1.8mg day, WEEK 4 use 2.4mg day, WEEK 5 use 3mg day (Patient not taking: Reported on 11/13/2023)     No current facility-administered medications on file prior to visit. She has No Known Allergies. .    Review of Systems   Constitutional:  Negative for fever. Respiratory:  Negative for shortness of breath. Cardiovascular:  Negative for chest pain and palpitations. Gastrointestinal:  Negative for abdominal pain, constipation, diarrhea and vomiting. Genitourinary:  Negative for difficulty urinating. Skin:  Negative for rash. Neurological:  Negative for headaches. Psychiatric/Behavioral:  Negative for dysphoric mood. The patient is not nervous/anxious. Objective:    /85   Pulse 81   Resp 16   Ht 5' 11" (1.803 m)   Wt 113 kg (249 lb)   LMP 01/01/2019 (Approximate)   BMI 34.73 kg/m²      Physical Exam  Vitals and nursing note reviewed. Constitutional:       General: She is not in acute distress. Appearance: She is well-developed. She is obese. HENT:      Head: Normocephalic and atraumatic. Eyes:      Conjunctiva/sclera: Conjunctivae normal.   Neck:      Thyroid: No thyromegaly. Pulmonary:      Effort: Pulmonary effort is normal. No respiratory distress. Skin:     Findings: No rash (visible). Neurological:      Mental Status: She is alert and oriented to person, place, and time.    Psychiatric:         Mood and Affect: Mood normal.         Behavior: Behavior normal.

## 2023-11-15 NOTE — ASSESSMENT & PLAN NOTE
-Patient is pursuing conservative program with RD visits after VLCD   -weight currently not at goal    Initial:273.4  Last visit:243.2  Current: 249    Change:-24.4 total  Goal:under 200    Goals:  -continue with food tracking. Discussed trying to stay consistent with it.   -continue water intake  -continue daily walking. strategies to help with nighttime snacking discussed      Was on seaxenda and doing well until supply shortage. She would be interested in wegovy or zepbound when becoming available. Still would continue with saxenda but concerned about future supply shortages as well Patient denies personal and family history of MCT and MEN2 tumors. Patient denies personal history of pancreatitis.  Seh was having constipation but did plan to increase dietary fiber and probiotic/prebiotic

## 2023-11-29 ENCOUNTER — TELEPHONE (OUTPATIENT)
Dept: BARIATRICS | Facility: CLINIC | Age: 53
End: 2023-11-29

## 2023-11-29 DIAGNOSIS — E66.9 OBESITY, CLASS I, BMI 30-34.9: Primary | ICD-10-CM

## 2023-11-29 NOTE — TELEPHONE ENCOUNTER
----- Message from [address] [place], [place] [address] sent at 11/29/2023  2:47 PM EST -----  Regarding: [place]  I just want to let you know I put in zepbound to the pharmacy for her. If it is/not covered let me know. I am sure it will need a PA.  THANks

## 2023-12-11 DIAGNOSIS — F98.8 ATTENTION DEFICIT DISORDER, UNSPECIFIED HYPERACTIVITY PRESENCE: ICD-10-CM

## 2023-12-11 RX ORDER — LISDEXAMFETAMINE DIMESYLATE CAPSULES 60 MG/1
60 CAPSULE ORAL EVERY MORNING
Qty: 30 CAPSULE | Refills: 0 | Status: SHIPPED | OUTPATIENT
Start: 2023-12-11

## 2023-12-11 NOTE — TELEPHONE ENCOUNTER
Requested medication(s) are due for refill today: Yes  Patient has already received a courtesy refill: No  Other reason request has been forwarded to provider:
Bhavna: pt seen and re-evaluated at bedside.  Pt states their symptoms have improved.  Pt comfortable in NAD. Hgb 5.0, pt agreeable with transfusion, written consent obtained. Discussed with hospitalist and MAR, accepted for admission.

## 2023-12-12 ENCOUNTER — TELEPHONE (OUTPATIENT)
Dept: BARIATRICS | Facility: CLINIC | Age: 53
End: 2023-12-12

## 2023-12-13 NOTE — TELEPHONE ENCOUNTER
Patient was informed of providers message. Patient doesn't want to try any of the medications list. She staed she will wait till saxenda becomes available.

## 2023-12-13 NOTE — TELEPHONE ENCOUNTER
Can you let her know. She will have to wait for wegovy to come back in. I have her name down for it.

## 2023-12-13 NOTE — TELEPHONE ENCOUNTER
Rose Shannen was also denied by her secondary insurance(.  They said she has to try and fail phentermine, qysmia or contrave first. Please advise

## 2023-12-13 NOTE — TELEPHONE ENCOUNTER
If she does want to try contrave that is an option. The phentermine and qsymia she can't take because they are stimulants and she is already on vyvanse. Contrave is a combination of wellbutrin (antidepressant ) and naltrexone (antiopiate). It overall decreases appetite and cravings. Possible side effects could be mood changes, increase blood pressure, headache, dizziness and GI upset.   It is a medication that is titrated up to 2 tabs 2 times a day over the course of a month

## 2023-12-15 ENCOUNTER — OFFICE VISIT (OUTPATIENT)
Dept: BARIATRICS | Facility: CLINIC | Age: 53
End: 2023-12-15

## 2023-12-15 VITALS — WEIGHT: 244 LBS | HEIGHT: 71 IN | BODY MASS INDEX: 34.16 KG/M2

## 2023-12-15 DIAGNOSIS — R63.5 ABNORMAL WEIGHT GAIN: Primary | ICD-10-CM

## 2023-12-15 DIAGNOSIS — E66.9 OBESITY, CLASS I, BMI 30-34.9: Primary | ICD-10-CM

## 2023-12-15 PROCEDURE — RECHECK

## 2023-12-15 PROCEDURE — WMDI30

## 2023-12-15 NOTE — PROGRESS NOTES
Name was verified by patient stating name? Yes   verified by patient stating? Yes  Verified the patient is alone? Yes  Offered patient a live visit but are now consenting to this telephone visit? Yes  Verified with the patient this visit that they will be contacted to collect payment? Yes   Verified with the patient they are in the state of [place]? Weight Management Medical Nutrition Assessment   Is here for meal planning. Self reported current wt: 244    lbs. She has gained ~4  lbs x ~9 wks with overall loss of  29.4  Lbs since 2022. At visit with PA last month Zepbound was ordered but this was denied by insurance. Would like to restart saxenda but also open to wegovy if that would be approved. Message sent to [place]. Nighttime is biggest struggle. Snacks while she gives her grandchildren their snack. Encouraged to have someone else in the family give the snack in order to change the routine. She does acknowledge that this is not physical hunger. Does not wish to see [phone]. Her daughter just gave [facility] [employer]. Encouraged to incorporate this. Discussed how seeing some change can start a bravo effect and may help with the evening. She will f/u in 2 months.         Patient seen by Medical Provider in past 6 months:  no  Requested to schedule appointment with Medical Provider: No    Anthropometric Measurements  Start Weight (#): 273.4 lbs 22    Current Weight (#): 244 lbs self reported   TBW % Change from start weight: 10.8%  Ideal Body Weight (#): 182.6 lbs BMI 25 (5'11.7" 157.5 lbs)  Goal Weight (#): 200 lbs  Highest:  Lowest:    Weight Loss History  Previous weight loss attempts: Commercial Programs ([employer], [employer], etc.)  Exercise  Self Created Diets (Portion Control, Healthy Food Choices, etc.)    Food and Nutrition Related History  Wake up: 4:30-5:30   Bed Time: 10    Food Recall  Snack: 6:00 Tristian's killer thin, 1 tbsp pb    Breakfast: 9-9:30 light & fit greek yogurt w/berries, flax seed  Lunch: 1:00:  replacement, fruit, string cheese   Snack: 3:30  skip OR bar    Dinner: 6:00-7:00: ~6 oz chicken/pork/hamburger/steak, veggies/salad, small amount of carb  Snacks: cookies    Beverages: water  Volume of beverage intake: 80 oz+    Weekends: Same  Cravings: none identified   Trouble area of day: night    Frequency of Eating out:  Varies   Food restrictions:  n/a  Cooking: self   Food Shopping: self    Physical Activity Intake  Activity: walking, fitness  workouts   Frequency: 5x/wk  3 mile walk, 2-3x/wk fitness   Physical limitations/barriers to exercise: n/a    Estimated Needs  Energy  [employer] Needs: BMR : 5747   1-2# loss weekly sedentary: 5867-3967             1-2# loss weekly lightly active: 4430-0995  Maintenance calories for sedentary activity level: 2156  Protein:  gm      (1.2-1.5g/kg IBW)  Fluid:  84 oz      (35mL/kg IBW)    Nutrition Diagnosis  Yes; Overweight/obesity  related to Excess energy intake as evidenced by  BMI more than normative standard for age and sex (obesity-grade I 32-30. 9)       Nutrition Intervention    Nutrition Prescription  Calories: 8537-4640  Protein: 125-145 gm  Carb: 100-145 gm    Meal Plan (Rick/Pro/Carb)  Snack: 200-300, 10-15, 15-25  Breakfast: 300, 35, 20  Lunch: 350,  35, 20-30  Snack: 160, 15, 18  Dinner: 450, 30-35, 25-35  Snack: 0150, 0-10     Nutrition Education:    Calorie controlled meal plan  Food logging/measuring  Hydration  fiber  Physical activity     Nutrition Counseling:  Strategies: as above    Monitoring and Evaluation:  Evaluation criteria:  Energy Intake  Meet protein needs  Maintain adequate hydration  Monitor weekly weight  Physical activity   Calorie controlled meal plan  Healthy snacks  Food logging/measuring    Barriers to learning:none  Readiness to change: Action:  (Changing behavior) Vignesh Fontaine  Comprehension: very good  Expected Compliance: good

## 2024-01-08 DIAGNOSIS — F98.8 ATTENTION DEFICIT DISORDER, UNSPECIFIED HYPERACTIVITY PRESENCE: ICD-10-CM

## 2024-01-08 RX ORDER — LISDEXAMFETAMINE DIMESYLATE CAPSULES 60 MG/1
60 CAPSULE ORAL EVERY MORNING
Qty: 30 CAPSULE | Refills: 0 | Status: SHIPPED | OUTPATIENT
Start: 2024-01-08 | End: 2024-01-11 | Stop reason: SDUPTHER

## 2024-01-11 DIAGNOSIS — F98.8 ATTENTION DEFICIT DISORDER, UNSPECIFIED HYPERACTIVITY PRESENCE: ICD-10-CM

## 2024-01-11 RX ORDER — LISDEXAMFETAMINE DIMESYLATE CAPSULES 60 MG/1
60 CAPSULE ORAL EVERY MORNING
Qty: 30 CAPSULE | Refills: 0 | Status: SHIPPED | OUTPATIENT
Start: 2024-01-11

## 2024-02-06 DIAGNOSIS — E66.9 OBESITY, CLASS I, BMI 30-34.9: ICD-10-CM

## 2024-02-06 NOTE — TELEPHONE ENCOUNTER
Reason for call:   [x] Refill   [] Prior Auth  [] Other:     Office:   [] PCP/Provider -   [x] Specialty/Provider - Rachel Randall PA-C     Medication: SAXENDA     Dose/Frequency: WEEK 1 use 0.6mg day, WEEK 2 use 1.2mg day, WEEK 3 use 1.8mg day, WEEK 4 use 2.4mg day, WEEK 5 use 3mg day     Quantity: 15 ml    Pharmacy: [id number]    Does the patient have enough for 3 days?   [] Yes   [x] No - Send as HP to POD

## 2024-02-08 DIAGNOSIS — F98.8 ATTENTION DEFICIT DISORDER, UNSPECIFIED HYPERACTIVITY PRESENCE: ICD-10-CM

## 2024-02-11 RX ORDER — LISDEXAMFETAMINE DIMESYLATE CAPSULES 60 MG/1
60 CAPSULE ORAL EVERY MORNING
Qty: 30 CAPSULE | Refills: 0 | Status: SHIPPED | OUTPATIENT
Start: 2024-02-11

## 2024-02-13 NOTE — PROGRESS NOTES
"Weight Management Medical Nutrition Assessment   Is here for meal planning. Current wt:  242.7 lbs. She has lost 1.3 lbs x ~2 months with overall loss of 30.7   Lbs since June 2022. Has been back on saxenda for about a month. Feels she has lost about 9 lbs since restarting. Some constipation noted. Moving bowels at least every 3 days. Upcoming trip discussed. She is interested in starting Wegovy if available. She will contact her pharmacy and if available will reach out to provider. Continues walking but does acknowledge she is not doing the fitness  workouts as she was. Encouraged strength training to preserve muscle mass. She will f/u in 3 months.       Patient seen by Medical Provider in past 6 months:  no  Requested to schedule appointment with Medical Provider: No    Anthropometric Measurements  Start Weight (#): 273.4 lbs 6/29/22    Current Weight (#): 242.7   TBW % Change from start weight: 11.2%  Ideal Body Weight (#): 182.6 lbs BMI 25 (5'11.7\" 157.5 lbs)  Goal Weight (#): 200 lbs  Highest:  Lowest:    Weight Loss History  Previous weight loss attempts: Commercial Programs (Weight Watchers, [employer], etc.)  Exercise  Self Created Diets (Portion Control, Healthy Food Choices, etc.)    Food and Nutrition Related History  Wake up: 4:30-5:30   Bed Time: 10    Food Recall  Snack: 6:00 Tristian's killer thin, 1 tbsp pb    Breakfast: 9-9:30 light & fit greek yogurt w/berries, flax seed  Lunch: 1:00:  replacement, fruit, string cheese OR tuna fish, 1 tbsp light alexander, veggies, 2 rye crisps  Snack: 3:30  skip OR bar  OR apple  Dinner: 6:00-7:00: ~6 oz chicken/pork/hamburger/steak, veggies/salad, 1/2-1 cup carb  Snacks: skip OR 1 cookie    Beverages: water  Volume of beverage intake: 80 oz+    Weekends: Same  Cravings: none identified   Trouble area of day: night    Frequency of Eating out:  Varies   Food restrictions:  n/a  Cooking: self   Food Shopping: self    Physical Activity Intake  Activity: walking, " fitness  workouts   Frequency: 5x/wk  3 mile walk, occasional fitness   Physical limitations/barriers to exercise: n/a    Estimated Needs  Energy  [employer]ur[employer]auf[employer] Energy Needs: BMR : 1797   1-2# loss weekly sedentary: 3150-3479             1-2# loss weekly lightly active: 0314-3371  Maintenance calories for sedentary activity level: 2156  Protein:  gm      (1.2-1.5g/kg IBW)  Fluid:  84 oz      (35mL/kg IBW)    Nutrition Diagnosis  Yes;    Overweight/obesity  related to Excess energy intake as evidenced by  BMI more than normative standard for age and sex (obesity-grade I 30-34.9)       Nutrition Intervention    Nutrition Prescription  Calories: 2756-0777  Protein: 125-145 gm  Carb: 100-145 gm    Meal Plan (Rick/Pro/Carb)  Snack: 200-300, 10-15, 15-25  Breakfast: 300, 35, 20  Lunch: 350,  35, 20-30  Snack: 160, 15, 18  Dinner: 450, 30-35, 25-35  Snack: 0150, 0-10     Nutrition Education:    Calorie controlled meal plan  Food logging/measuring  Hydration  fiber  Physical activity     Nutrition Counseling:  Strategies: as above    Monitoring and Evaluation:  Evaluation criteria:  Energy Intake  Meet protein needs  Maintain adequate hydration  Monitor weekly weight  Physical activity   Calorie controlled meal plan  Healthy snacks  Food logging/measuring    Barriers to learning:none  Readiness to change: Action:  (Changing behavior)    Comprehension: very good  Expected Compliance: good

## 2024-02-16 ENCOUNTER — CLINICAL SUPPORT (OUTPATIENT)
Dept: BARIATRICS | Facility: CLINIC | Age: 54
End: 2024-02-16

## 2024-02-16 VITALS — BODY MASS INDEX: 32.87 KG/M2 | HEIGHT: 72 IN | WEIGHT: 242.7 LBS

## 2024-02-16 DIAGNOSIS — R63.5 ABNORMAL WEIGHT GAIN: Primary | ICD-10-CM

## 2024-02-16 PROCEDURE — WMDI30

## 2024-02-16 PROCEDURE — RECHECK

## 2024-02-28 DIAGNOSIS — E66.9 OBESITY, CLASS I, BMI 30-34.9: ICD-10-CM

## 2024-03-06 DIAGNOSIS — E66.9 OBESITY, CLASS I, BMI 30-34.9: ICD-10-CM

## 2024-03-12 DIAGNOSIS — F98.8 ATTENTION DEFICIT DISORDER, UNSPECIFIED HYPERACTIVITY PRESENCE: ICD-10-CM

## 2024-03-12 RX ORDER — LISDEXAMFETAMINE DIMESYLATE CAPSULES 60 MG/1
60 CAPSULE ORAL EVERY MORNING
Qty: 30 CAPSULE | Refills: 0 | Status: SHIPPED | OUTPATIENT
Start: 2024-03-12

## 2024-04-01 ENCOUNTER — OFFICE VISIT (OUTPATIENT)
Dept: BARIATRICS | Facility: CLINIC | Age: 54
End: 2024-04-01
Payer: COMMERCIAL

## 2024-04-01 VITALS
RESPIRATION RATE: 16 BRPM | TEMPERATURE: 97.6 F | HEART RATE: 75 BPM | HEIGHT: 71 IN | DIASTOLIC BLOOD PRESSURE: 80 MMHG | SYSTOLIC BLOOD PRESSURE: 122 MMHG | BODY MASS INDEX: 34.13 KG/M2 | WEIGHT: 243.8 LBS

## 2024-04-01 DIAGNOSIS — E66.9 OBESITY, CLASS I, BMI 30-34.9: Primary | ICD-10-CM

## 2024-04-01 PROCEDURE — 99213 OFFICE O/P EST LOW 20 MIN: CPT | Performed by: PHYSICIAN ASSISTANT

## 2024-04-01 RX ORDER — SODIUM FLUORIDE 1.1 G/100G
CREAM ORAL
COMMUNITY
Start: 2024-01-04

## 2024-04-01 NOTE — PROGRESS NOTES
Assessment/Plan:    Obesity, Class I, BMI 30-34.9  -Patient is pursuing conservative program with RD visits after VLCD   -weight currently not at goal    Initial:273.4  Last visit:249  Current: 243.8  Change:-29.6 total  Goal:under 200    Goals:  -continue with food tracking.  Discussed trying to stay consistent with it.   -continue water intake  -continue current exercise plan    To switch to wegovy from saxenda since she has not been able to take the saxenda consistent due to supply shortage. Not having any side effects currently but has had some constipation prior.         Follow up in approximately  4 months  with Non-Surgical Physician/Advanced Practitioner and will be seeing RD in 2 months.     Diagnoses and all orders for this visit:    Obesity, Class I, BMI 30-34.9  -     Semaglutide-Weight Management (WEGOVY) 0.25 MG/0.5ML; Inject 0.5 mL (0.25 mg total) under the skin once a week    Other orders  -     Denta 5000 Plus 1.1 % CREA; BRUSH ON TEETH TWICE A DAY AFTER REGULAR BRUSING          Subjective:   Chief Complaint   Patient presents with    Follow-up     MWM-4M F/u        Patient ID: Pamela L Fothergill  is a 54 y.o. female with excess weight/obesity here to pursue weight managment.  Patient is pursuing Conservative Program.     HPI  She is on saxenda but has had problems with supply.  She is not able to get a refill of it now but has been on it for last 2 months.  She had been trying to stretch out the dose of it. She is doing 3mg currently had been on it. She has changed her activity and is trying to monitor diet  Wt Readings from Last 10 Encounters:   04/01/24 111 kg (243 lb 12.8 oz)   02/16/24 110 kg (242 lb 11.2 oz)   12/15/23 111 kg (244 lb)   11/15/23 113 kg (249 lb)   11/13/23 114 kg (251 lb)   10/04/23 109 kg (240 lb 12.8 oz)   08/17/23 106 kg (234 lb 9.6 oz)   08/01/23 110 kg (242 lb)   07/18/23 111 kg (244 lb 12.8 oz)   07/11/23 110 kg (243 lb 3.2 oz)       Food logging:  Increased  appetite/cravings:  Exercise:walking 5 x week, fitness  3 x week  Hydration:water 80 +      The following portions of the patient's history were reviewed and updated as appropriate: She  has a past medical history of Class 2 severe obesity due to excess calories with serious comorbidity and body mass index (BMI) of 35.0 to 35.9 in adult (HCC), Hemochromatosis, Hereditary hemochromatosis (HCC) (4/5/2018), and IBS (irritable bowel syndrome).  She   Patient Active Problem List    Diagnosis Date Noted    Obesity, Class I, BMI 30-34.9 09/12/2022    HUMPHREY (obstructive sleep apnea)     Hereditary hemochromatosis (HCC) 04/05/2018    ADD (attention deficit disorder) 05/07/2012    Allergic rhinitis 05/07/2012     She  has a past surgical history that includes Colonoscopy; Hysteroscopy w/ endometrial ablation; Nasal septum surgery; pr imelda bejarano bncty [facility] joint arthrodesis (Left, 03/19/2021); and Bunionectomy (Left, 03/2021).  Her family history includes Arrhythmia in her mother; Heart attack in her maternal aunt, maternal grandfather, and paternal grandfather; Heart disease in her father, maternal aunt, mother, paternal grandfather, and paternal grandmother; Hypertension in her mother; No Known Problems in her brother, daughter, daughter, maternal grandmother, paternal aunt, paternal aunt, and sister; Uterine cancer (age of onset: 60) in her mother.  She  reports that she quit smoking about 21 years ago. Her smoking use included cigarettes. She started smoking about 26 years ago. She has a 1 pack-year smoking history. She has never used smokeless tobacco. She reports current alcohol use. She reports that she does not use drugs.  Current Outpatient Medications   Medication Sig Dispense Refill    cetirizine (ZyrTEC) 10 mg tablet Take 10 mg by mouth daily      Cholecalciferol (VITAMIN D) 2000 units CAPS Take 1 capsule by mouth daily      Denta 5000 Plus 1.1 % CREA BRUSH ON TEETH TWICE A DAY AFTER REGULAR BRUSING       fluticasone (FLONASE) 50 mcg/act nasal spray 1 spray into each nostril daily      Insulin Pen Needle 32G X 4 MM MISC Use daily With saxenda 100 each 0    lisdexamfetamine (VYVANSE) 60 MG capsule Take 1 capsule (60 mg total) by mouth every morning Max Daily Amount: 60 mg 30 capsule 0    Multiple Vitamin (multivitamin) tablet Take 1 tablet by mouth daily      Probiotic Product (PROBIOTIC DAILY PO) Take by mouth      Semaglutide-Weight Management (WEGOVY) 0.25 MG/0.5ML Inject 0.5 mL (0.25 mg total) under the skin once a week 2 mL 0    triamcinolone (KENALOG) 0.1 % cream Apply topically 2 (two) times a day 30 g 0    ZINC-VITAMIN C PO Take by mouth       No current facility-administered medications for this visit.     Current Outpatient Medications on File Prior to Visit   Medication Sig    cetirizine (ZyrTEC) 10 mg tablet Take 10 mg by mouth daily    Cholecalciferol (VITAMIN D) 2000 units CAPS Take 1 capsule by mouth daily    Denta 5000 Plus 1.1 % CREA BRUSH ON TEETH TWICE A DAY AFTER REGULAR BRUSING    fluticasone (FLONASE) 50 mcg/act nasal spray 1 spray into each nostril daily    Insulin Pen Needle 32G X 4 MM MISC Use daily With saxenda    lisdexamfetamine (VYVANSE) 60 MG capsule Take 1 capsule (60 mg total) by mouth every morning Max Daily Amount: 60 mg    Multiple Vitamin (multivitamin) tablet Take 1 tablet by mouth daily    Probiotic Product (PROBIOTIC DAILY PO) Take by mouth    triamcinolone (KENALOG) 0.1 % cream Apply topically 2 (two) times a day    ZINC-VITAMIN C PO Take by mouth    [DISCONTINUED] liraglutide (SAXENDA) injection Inject subcutaneously 3mg day     No current facility-administered medications on file prior to visit.     She has No Known Allergies..    Review of Systems   Constitutional:  Negative for fever.   Respiratory:  Negative for shortness of breath.    Cardiovascular:  Negative for chest pain and palpitations.   Gastrointestinal:  Negative for abdominal pain, constipation, diarrhea and  "vomiting.   Genitourinary:  Negative for difficulty urinating.   Skin:  Negative for rash.   Neurological:  Negative for headaches.   Psychiatric/Behavioral:  Negative for dysphoric mood. The patient is not nervous/anxious.        Objective:    /80   Pulse 75   Temp 97.6 °F (36.4 °C)   Resp 16   Ht 5' 11\" (1.803 m)   Wt 111 kg (243 lb 12.8 oz)   LMP 01/01/2019 (Approximate)   BMI 34.00 kg/m²      Physical Exam  Vitals and nursing note reviewed.   Constitutional:       General: She is not in acute distress.     Appearance: She is well-developed. She is obese.   HENT:      Head: Normocephalic and atraumatic.   Eyes:      Conjunctiva/sclera: Conjunctivae normal.   Neck:      Thyroid: No thyromegaly.   Pulmonary:      Effort: Pulmonary effort is normal. No respiratory distress.   Skin:     Findings: No rash (visible).   Neurological:      Mental Status: She is alert and oriented to person, place, and time.   Psychiatric:         Mood and Affect: Mood normal.         Behavior: Behavior normal.         "

## 2024-04-01 NOTE — ASSESSMENT & PLAN NOTE
-Patient is pursuing conservative program with RD visits after VLCD   -weight currently not at goal    Initial:273.4  Last visit:249  Current: 243.8  Change:-29.6 total  Goal:under 200    Goals:  -continue with food tracking.  Discussed trying to stay consistent with it.   -continue water intake  -continue current exercise plan    To switch to wegovy from saxenda since she has not been able to take the saxenda consistent due to supply shortage. Not having any side effects currently but has had some constipation prior.

## 2024-04-05 ENCOUNTER — TELEPHONE (OUTPATIENT)
Age: 54
End: 2024-04-05

## 2024-04-05 NOTE — TELEPHONE ENCOUNTER
Pt is calling to advise that the pharmacy has her Wegovy prescription but the pharmacy is requesting a prior auth. Please contact pt [phone].   Pharmacy [facility]

## 2024-04-09 DIAGNOSIS — F98.8 ATTENTION DEFICIT DISORDER, UNSPECIFIED HYPERACTIVITY PRESENCE: ICD-10-CM

## 2024-04-09 RX ORDER — LISDEXAMFETAMINE DIMESYLATE CAPSULES 60 MG/1
60 CAPSULE ORAL EVERY MORNING
Qty: 30 CAPSULE | Refills: 0 | Status: SHIPPED | OUTPATIENT
Start: 2024-04-09

## 2024-04-10 ENCOUNTER — TELEPHONE (OUTPATIENT)
Age: 54
End: 2024-04-10

## 2024-04-10 NOTE — TELEPHONE ENCOUNTER
PA for Wegovy    Submitted via    [x]CMM-KEY [id number]  []Surescchidi-Case ID #    []Faxed to plan   []Other website    []Phone call Case ID #      Office notes sent, clinical questions answered. Awaiting determination    Message from Plan  Please advise the pharmacy to first submit request to members primary plan.      Turnaround time for your insurance to make a decision on your Prior Authorization can take 7-21 business days.

## 2024-04-10 NOTE — TELEPHONE ENCOUNTER
PA for Wegovy    Submitted via    [x]CMM-KEY [id number]  []Surescri"[employer]-Case ID #   []Faxed to plan   []Other website   []Phone call Case ID #     Office notes sent, clinical questions answered. Awaiting determination    Turnaround time for your insurance to make a decision on your Prior Authorization can take 7-21 business days.

## 2024-04-12 NOTE — TELEPHONE ENCOUNTER
PA for Wegovy    Submitted via    [x]CMM-KEY [id number]   []Surescripts-Case ID #    []Faxed to plan   []Other website    []Phone call Case ID #      Office notes sent, clinical questions answered. Awaiting determination    Turnaround time for your insurance to make a decision on your Prior Authorization can take 7-21 business days.

## 2024-04-15 ENCOUNTER — TELEPHONE (OUTPATIENT)
Age: 54
End: 2024-04-15

## 2024-04-15 NOTE — TELEPHONE ENCOUNTER
Pt called about the prior auth for her Wegovy.  I explained it can take 7-21 days for the prior auth.  I explained that we are still at still waiting for a response.  She would like someone to please let her know when it has been approved.  She is calling her insurance to see if she can speed up the process

## 2024-04-18 ENCOUNTER — TELEPHONE (OUTPATIENT)
Age: 54
End: 2024-04-18

## 2024-04-18 NOTE — TELEPHONE ENCOUNTER
PA for Wegovy    Submitted via    [x]CMM-KEY [id number]  []Surescripts-Case ID #    []Faxed to plan   []Other website    []Phone call Case ID #      Office notes sent, clinical questions answered. Awaiting determination    Message from Plan: The [facility] Prior Authorization Team is unable to review this request for prior authorization as the medication has been previously approved. This approval will  4/15/2025. No further action is needed at this time.      Turnaround time for your insurance to make a decision on your Prior Authorization can take 7-21 business days.

## 2024-04-23 ENCOUNTER — TELEPHONE (OUTPATIENT)
Age: 54
End: 2024-04-23

## 2024-04-23 NOTE — TELEPHONE ENCOUNTER
Called to reschedule patient due to a change in the provider's schedule.  Appt is now 6/12 at 9 am

## 2024-05-06 ENCOUNTER — TELEPHONE (OUTPATIENT)
Age: 54
End: 2024-05-06

## 2024-05-06 NOTE — TELEPHONE ENCOUNTER
Patient requesting wegovy, stated she was told it would be increased, pharmacy is [facility] in [place]

## 2024-05-07 DIAGNOSIS — E66.9 OBESITY, CLASS I, BMI 30-34.9: Primary | ICD-10-CM

## 2024-05-10 DIAGNOSIS — F98.8 ATTENTION DEFICIT DISORDER, UNSPECIFIED HYPERACTIVITY PRESENCE: ICD-10-CM

## 2024-05-10 RX ORDER — LISDEXAMFETAMINE DIMESYLATE 60 MG/1
60 CAPSULE ORAL EVERY MORNING
Qty: 30 CAPSULE | Refills: 0 | Status: SHIPPED | OUTPATIENT
Start: 2024-05-10

## 2024-05-22 NOTE — PROGRESS NOTES
"Weight Management Medical Nutrition Assessment   Is here for meal planning. Current wt: 244  lbs. She has gained 1.3   lbs x ~2 months with overall loss of  29.4  Lbs since June 2022. She is currently on week 6 of wegovy. Had a 6 wk break from any medication and gained during that time. She is starting to notice positive effects. No constipation or other side effects. On her home scale has noticed a loss of 10 lbs. Getting adequate protein throughout the day. Continues walking and started to make more of an effort to incorporate the fitness  workouts. Keep up the great work! She will f/u in 3 1/2 months.       Patient seen by Medical Provider in past 6 months:  yes  Requested to schedule appointment with Medical Provider: No    Anthropometric Measurements  Start Weight (#): 273.4 lbs 6/29/22    Current Weight (#): 244  TBW % Change from start weight: 10.7%  Ideal Body Weight (#): 182.6 lbs BMI 25 (5'11.7\" 157.5 lbs)  Goal Weight (#): 200 lbs  Highest:  Lowest:    Weight Loss History  Previous weight loss attempts: Commercial Programs (Weight Watchers, [email or web address], etc.)  Exercise  Self Created Diets (Portion Control, Healthy Food Choices, etc.)    Food and Nutrition Related History  Wake up: 4:30-5:30   Bed Time: 10    Food Recall  Snack: 6:00 Tristian's killer thin, 1 tbsp pb    Breakfast: 9-9:30 light & fit greek yogurt w/berries, flax seed  Lunch: 1:00:  salad, grilled chicken, 5 buddy dressing, sometimes cheese  Snack: 3:30  skip OR popcorn  Dinner: 6:00-7:00: ~6 oz chicken/pork/hamburger/steak, veggies/salad, 1/2 cup carb  Snacks: skip OR bar    Beverages: water  Volume of beverage intake: 80 oz+    Weekends: Same  Cravings: none identified   Trouble area of day: night    Frequency of Eating out:  Varies   Food restrictions:  n/a  Cooking: self   Food Shopping: self    Physical Activity Intake  Activity: walking, fitness  workouts   Frequency: 5x/wk  3 mile walk, occasional fitness   Physical " limitations/barriers to exercise: n/a    Estimated Needs  Energy  Apurva Kaufman Energy Needs: BMR : 1797   1-2# loss weekly sedentary: 0561-3479             1-2# loss weekly lightly active: 1069-4956  Maintenance calories for sedentary activity level: 2156  Protein:  gm      (1.2-1.5g/kg IBW)  Fluid:  84 oz      (35mL/kg IBW)    Nutrition Diagnosis  Yes;    Overweight/obesity  related to Excess energy intake as evidenced by  BMI more than normative standard for age and sex (obesity-grade I 30-34.9)       Nutrition Intervention    Nutrition Prescription  Calories: 6838-3669  Protein: 125-145 gm  Carb: 100-145 gm    Meal Plan (Rick/Pro/Carb)  Snack: 200-300, 10-15, 15-25  Breakfast: 300, 35, 20  Lunch: 350,  35, 20-30  Snack: 160, 15, 18  Dinner: 450, 30-35, 25-35  Snack: 0150, 0-10     Nutrition Education:    Calorie controlled meal plan  Food logging/measuring  Hydration  fiber  Physical activity     Nutrition Counseling:  Strategies: as above    Monitoring and Evaluation:  Evaluation criteria:  Energy Intake  Meet protein needs  Maintain adequate hydration  Monitor weekly weight  Physical activity   Calorie controlled meal plan  Healthy snacks  Food logging/measuring    Barriers to learning:none  Readiness to change: Action:  (Changing behavior)    Comprehension: very good  Expected Compliance: good

## 2024-05-29 ENCOUNTER — CLINICAL SUPPORT (OUTPATIENT)
Dept: BARIATRICS | Facility: CLINIC | Age: 54
End: 2024-05-29

## 2024-05-29 ENCOUNTER — TELEPHONE (OUTPATIENT)
Dept: BARIATRICS | Facility: CLINIC | Age: 54
End: 2024-05-29

## 2024-05-29 VITALS
TEMPERATURE: 98.9 F | RESPIRATION RATE: 16 BRPM | SYSTOLIC BLOOD PRESSURE: 126 MMHG | HEART RATE: 81 BPM | BODY MASS INDEX: 34.16 KG/M2 | HEIGHT: 71 IN | WEIGHT: 244 LBS | DIASTOLIC BLOOD PRESSURE: 80 MMHG

## 2024-05-29 DIAGNOSIS — R63.5 ABNORMAL WEIGHT GAIN: Primary | ICD-10-CM

## 2024-05-29 DIAGNOSIS — E66.9 OBESITY, CLASS I, BMI 30-34.9: Primary | ICD-10-CM

## 2024-05-29 PROCEDURE — RECHECK

## 2024-05-29 PROCEDURE — WMDI30

## 2024-05-29 NOTE — PROGRESS NOTES
Patient last visit weight:243lb 12.8oz  Patient current visit weight:244.0lb    If you are taking phentermine or other oral weight loss medications, are you experiencing any of the following symptoms:  Headache:   Blurred Vision:   Chest Pain:   Palpitations:  Insomnia:   SPECIFY ORAL MEDICATION AND DOSAGE:     If you are taking an injectable medication,  are you experiencing any of the following symptoms:  Bloating: NO  Nausea:NO  Vomiting: NO  Constipation: NO  Diarrhea:NO  SPECIFY INJECTABLE MEDICATION AND CURRENT DOSAGE: WEGOVY      Vitals:    Is BP less than 100/60?NO  Is BP greater than 140/90?NO  Is HR greater than 100?NO  **If yes to any of the above, have patient relax and repeat in 5-10 minutes**    Repeat values:    Is BP less than 100/60?  Is BP greater than 140/90?  Is HR greater than 100?  **If values remain outside of ranges above, please consult provider for next steps**

## 2024-05-29 NOTE — TELEPHONE ENCOUNTER
Patient came in for a nurse visit today. Patient requesting a dose increase of her wegovy to be sent to [facility] pharmacy on file already. Patient tolerated previous dose without any incident.

## 2024-06-06 NOTE — PROGRESS NOTES
HEMATOLOGY / ONCOLOGY CLINIC FOLLOW UP NOTE    Primary Care Provider: NATHALIE Escalante  Referring Provider:    MRN: [id number]  : 1970    Reason for Encounter: Follow-up for hereditary hemochromatosis     Interval History: Patient returns for follow-up visit.  She has not been able to donate blood as often as she has been in the past.  Recent labs done prior to today's visit does show increased iron saturation 62%, serum ferritin 57.  CBC with differential is normal  She feels well.  Recently started Wegovy for weight loss.  Planning her daughter's wedding      REVIEW OF SYSTEMS:  Please note that a 14-point review of systems was performed to include Constitutional, HEENT, Respiratory, CVS, GI, , Musculoskeletal, Integumentary, Neurologic, Rheumatologic, Endocrinologic, Psychiatric, Lymphatic, and Hematologic/Oncologic systems were reviewed and are negative unless otherwise stated in HPI. Positive and negative findings pertinent to this evaluation are incorporated into the history of present illness.      ECOG PS: 0    PROBLEM LIST:  Patient Active Problem List   Diagnosis    ADD (attention deficit disorder)    Allergic rhinitis    Hereditary hemochromatosis (HCC)    HUMPHREY (obstructive sleep apnea)    Obesity, Class I, BMI 30-34.9       Assessment / Plan:  1. Hereditary hemochromatosis (HCC)      Patient is a pleasant 54-year-old female with hereditary hemochromatosis.  She has been donating blood at [facility].  Recent labs show mild elevation in iron saturation with a serum ferritin of 57.  She is due to donate blood at this time and knows to schedule an appointment.  She should continue to donate blood on a regular basis.  She will return for a follow-up visit in 6 months with repeat blood work  Patient is in agreement.  She knows to call anytime with questions or concerns      I spent 20 minutes on chart review, face to face counseling time, coordination of care and documentation.    Past Medical  History:   has a past medical history of Class 2 severe obesity due to excess calories with serious comorbidity and body mass index (BMI) of 35.0 to 35.9 in adult (HCC), Hemochromatosis, Hereditary hemochromatosis (HCC) (4/5/2018), and IBS (irritable bowel syndrome).    PAST SURGICAL HISTORY:   has a past surgical history that includes Colonoscopy; Hysteroscopy w/ endometrial ablation; Nasal septum surgery; pr corrj hlx vlgs bncty [facility] joint arthrodesis (Left, 03/19/2021); and Bunionectomy (Left, 03/2021).    CURRENT MEDICATIONS  Current Outpatient Medications   Medication Sig Dispense Refill    cetirizine (ZyrTEC) 10 mg tablet Take 10 mg by mouth daily      Cholecalciferol (VITAMIN D) 2000 units CAPS Take 1 capsule by mouth daily      Denta 5000 Plus 1.1 % CREA BRUSH ON TEETH TWICE A DAY AFTER REGULAR BRUSING      fluticasone (FLONASE) 50 mcg/act nasal spray 1 spray into each nostril daily      Insulin Pen Needle 32G X 4 MM MISC Use daily With saxenda (Patient not taking: Reported on 5/29/2024) 100 each 0    lisdexamfetamine (VYVANSE) 60 MG capsule Take 1 capsule (60 mg total) by mouth every morning Max Daily Amount: 60 mg 30 capsule 0    Multiple Vitamin (multivitamin) tablet Take 1 tablet by mouth daily      Probiotic Product (PROBIOTIC DAILY PO) Take by mouth      Semaglutide-Weight Management (WEGOVY) 0.5 MG/0.5ML Inject 0.5 mL (0.5 mg total) under the skin once a week 2 mL 0    Semaglutide-Weight Management (WEGOVY) 1 MG/0.5ML Inject 0.5 mL (1 mg total) under the skin once a week 2 mL 0    triamcinolone (KENALOG) 0.1 % cream Apply topically 2 (two) times a day (Patient taking differently: Apply topically as needed) 30 g 0    ZINC-VITAMIN C PO Take by mouth       No current facility-administered medications for this visit.     [unfilled]    SOCIAL HISTORY:   reports that she quit smoking about 21 years ago. Her smoking use included cigarettes. She started smoking about 26 years ago. She has a 1 pack-year  smoking history. She has never used smokeless tobacco. She reports current alcohol use. She reports that she does not use drugs.     FAMILY HISTORY:  family history includes Arrhythmia in her mother; Heart attack in her maternal aunt, maternal grandfather, and paternal grandfather; Heart disease in her father, maternal aunt, mother, paternal grandfather, and paternal grandmother; Hypertension in her mother; No Known Problems in her brother, daughter, daughter, maternal grandmother, paternal aunt, paternal aunt, and sister; Uterine cancer (age of onset: 60) in her mother.     ALLERGIES:  has No Known Allergies.      Physical Exam:  Vital Signs:   Visit Vitals  LMP 01/01/2019 (Approximate)   OB Status Postmenopausal   Smoking Status Former     There is no height or weight on file to calculate BMI.  There is no height or weight on file to calculate BSA.    Physical Exam  Constitutional:       General: She is not in acute distress.     Appearance: Normal appearance.   HENT:      Head: Normocephalic and atraumatic.   Eyes:      General: No scleral icterus.        Right eye: No discharge.         Left eye: No discharge.      Conjunctiva/sclera: Conjunctivae normal.   Cardiovascular:      Rate and Rhythm: Normal rate and regular rhythm.   Pulmonary:      Effort: Pulmonary effort is normal. No respiratory distress.      Breath sounds: Normal breath sounds.   Abdominal:      General: Bowel sounds are normal. There is no distension.      Palpations: Abdomen is soft. There is no mass.      Tenderness: There is no abdominal tenderness.   Musculoskeletal:         General: Normal range of motion.   Lymphadenopathy:      Cervical: No cervical adenopathy.      Upper Body:      Right upper body: No supraclavicular, axillary or pectoral adenopathy.      Left upper body: No supraclavicular, axillary or pectoral adenopathy.   Skin:     General: Skin is warm and dry.   Neurological:      General: No focal deficit present.      Mental  Status: She is alert and oriented to person, place, and time.   Psychiatric:         Mood and Affect: Mood normal.         Behavior: Behavior normal.         Labs:  Lab Results   Component Value Date    WBC 6.44 11/07/2023    HGB 14.7 11/07/2023    HCT 45.2 11/07/2023    MCV 97 11/07/2023     11/07/2023     Lab Results   Component Value Date     06/04/2014    SODIUM 139 11/07/2023    K 4.2 11/07/2023     11/07/2023    CO2 29 11/07/2023    ANIONGAP 1 (L) 06/04/2014    AGAP 7 11/07/2023    BUN 13 11/07/2023    CREATININE 0.57 (L) 11/07/2023    GLUC 102 (H) 06/28/2021    GLUF 95 11/07/2023    CALCIUM 9.5 11/07/2023    AST 18 11/07/2023    ALT 17 11/07/2023    ALKPHOS 108 (H) 11/07/2023    PROT 6.9 06/04/2014    TP 7.0 11/07/2023    BILITOT 1.0 06/04/2014    TBILI 1.26 (H) 11/07/2023    EGFR 105 11/07/2023

## 2024-06-07 ENCOUNTER — TELEPHONE (OUTPATIENT)
Age: 54
End: 2024-06-07

## 2024-06-07 NOTE — TELEPHONE ENCOUNTER
Spoke to Jewell regarding having lab work completed for upcoming appointment. Patient stated she will be going to the lab Monday.

## 2024-06-10 ENCOUNTER — APPOINTMENT (OUTPATIENT)
Dept: LAB | Facility: HOSPITAL | Age: 54
End: 2024-06-10
Payer: COMMERCIAL

## 2024-06-10 DIAGNOSIS — E83.110 HEREDITARY HEMOCHROMATOSIS (HCC): ICD-10-CM

## 2024-06-10 DIAGNOSIS — F98.8 ATTENTION DEFICIT DISORDER, UNSPECIFIED HYPERACTIVITY PRESENCE: ICD-10-CM

## 2024-06-10 LAB
ALBUMIN SERPL BCP-MCNC: 3.9 G/DL (ref 3.5–5)
ALP SERPL-CCNC: 100 U/L (ref 34–104)
ALT SERPL W P-5'-P-CCNC: 14 U/L (ref 7–52)
ANION GAP SERPL CALCULATED.3IONS-SCNC: 8 MMOL/L (ref 4–13)
AST SERPL W P-5'-P-CCNC: 20 U/L (ref 13–39)
BASOPHILS # BLD AUTO: 0.01 THOUSANDS/ÂΜL (ref 0–0.1)
BASOPHILS NFR BLD AUTO: 0 % (ref 0–1)
BILIRUB SERPL-MCNC: 1.16 MG/DL (ref 0.2–1)
BUN SERPL-MCNC: 11 MG/DL (ref 5–25)
CALCIUM SERPL-MCNC: 8.9 MG/DL (ref 8.4–10.2)
CHLORIDE SERPL-SCNC: 105 MMOL/L (ref 96–108)
CO2 SERPL-SCNC: 27 MMOL/L (ref 21–32)
CREAT SERPL-MCNC: 0.6 MG/DL (ref 0.6–1.3)
EOSINOPHIL # BLD AUTO: 0.2 THOUSAND/ÂΜL (ref 0–0.61)
EOSINOPHIL NFR BLD AUTO: 4 % (ref 0–6)
ERYTHROCYTE [DISTWIDTH] IN BLOOD BY AUTOMATED COUNT: 11.5 % (ref 11.6–15.1)
FERRITIN SERPL-MCNC: 57 NG/ML (ref 11–307)
GFR SERPL CREATININE-BSD FRML MDRD: 102 ML/MIN/1.73SQ M
GLUCOSE P FAST SERPL-MCNC: 85 MG/DL (ref 65–99)
HCT VFR BLD AUTO: 47.3 % (ref 34.8–46.1)
HGB BLD-MCNC: 15.5 G/DL (ref 11.5–15.4)
IMM GRANULOCYTES # BLD AUTO: 0.01 THOUSAND/UL (ref 0–0.2)
IMM GRANULOCYTES NFR BLD AUTO: 0 % (ref 0–2)
IRON SATN MFR SERPL: 62 % (ref 15–50)
IRON SERPL-MCNC: 172 UG/DL (ref 50–212)
LYMPHOCYTES # BLD AUTO: 1.96 THOUSANDS/ÂΜL (ref 0.6–4.47)
LYMPHOCYTES NFR BLD AUTO: 40 % (ref 14–44)
MCH RBC QN AUTO: 32.2 PG (ref 26.8–34.3)
MCHC RBC AUTO-ENTMCNC: 32.8 G/DL (ref 31.4–37.4)
MCV RBC AUTO: 98 FL (ref 82–98)
MONOCYTES # BLD AUTO: 0.33 THOUSAND/ÂΜL (ref 0.17–1.22)
MONOCYTES NFR BLD AUTO: 7 % (ref 4–12)
NEUTROPHILS # BLD AUTO: 2.37 THOUSANDS/ÂΜL (ref 1.85–7.62)
NEUTS SEG NFR BLD AUTO: 49 % (ref 43–75)
NRBC BLD AUTO-RTO: 0 /100 WBCS
PLATELET # BLD AUTO: 191 THOUSANDS/UL (ref 149–390)
PMV BLD AUTO: 11.1 FL (ref 8.9–12.7)
POTASSIUM SERPL-SCNC: 4 MMOL/L (ref 3.5–5.3)
PROT SERPL-MCNC: 6.7 G/DL (ref 6.4–8.4)
RBC # BLD AUTO: 4.82 MILLION/UL (ref 3.81–5.12)
SODIUM SERPL-SCNC: 140 MMOL/L (ref 135–147)
TIBC SERPL-MCNC: 277 UG/DL (ref 250–450)
UIBC SERPL-MCNC: 105 UG/DL (ref 155–355)
WBC # BLD AUTO: 4.88 THOUSAND/UL (ref 4.31–10.16)

## 2024-06-10 PROCEDURE — 82728 ASSAY OF FERRITIN: CPT

## 2024-06-10 PROCEDURE — 83540 ASSAY OF IRON: CPT

## 2024-06-10 PROCEDURE — 36415 COLL VENOUS BLD VENIPUNCTURE: CPT

## 2024-06-10 PROCEDURE — 85025 COMPLETE CBC W/AUTO DIFF WBC: CPT

## 2024-06-10 PROCEDURE — 80053 COMPREHEN METABOLIC PANEL: CPT

## 2024-06-10 PROCEDURE — 83550 IRON BINDING TEST: CPT

## 2024-06-10 RX ORDER — LISDEXAMFETAMINE DIMESYLATE 60 MG/1
60 CAPSULE ORAL EVERY MORNING
Qty: 30 CAPSULE | Refills: 0 | Status: SHIPPED | OUTPATIENT
Start: 2024-06-10

## 2024-06-12 ENCOUNTER — OFFICE VISIT (OUTPATIENT)
Age: 54
End: 2024-06-12
Payer: COMMERCIAL

## 2024-06-12 VITALS
HEART RATE: 79 BPM | BODY MASS INDEX: 33.88 KG/M2 | OXYGEN SATURATION: 96 % | RESPIRATION RATE: 16 BRPM | HEIGHT: 71 IN | DIASTOLIC BLOOD PRESSURE: 77 MMHG | WEIGHT: 242 LBS | SYSTOLIC BLOOD PRESSURE: 118 MMHG | TEMPERATURE: 97.8 F

## 2024-06-12 DIAGNOSIS — E83.110 HEREDITARY HEMOCHROMATOSIS (HCC): Primary | ICD-10-CM

## 2024-06-12 PROCEDURE — 99213 OFFICE O/P EST LOW 20 MIN: CPT | Performed by: NURSE PRACTITIONER

## 2024-06-26 ENCOUNTER — TELEPHONE (OUTPATIENT)
Age: 54
End: 2024-06-26

## 2024-06-26 NOTE — TELEPHONE ENCOUNTER
PA for lisdexamfetamine (VYVANSE)     Submitted via    [x]CMM-KEY [id number]  []Surescripts-Case ID #    []Faxed to plan   []Other website    []Phone call Case ID #      Office notes sent, clinical questions answered. Awaiting determination    Turnaround time for your insurance to make a decision on your Prior Authorization can take 7-21 business days.       Outcome Additional Information Required  The [facility] Prior Authorization Team is unable to review this request as no Prior Authorization is required at this time.

## 2024-07-06 DIAGNOSIS — F98.8 ATTENTION DEFICIT DISORDER, UNSPECIFIED HYPERACTIVITY PRESENCE: ICD-10-CM

## 2024-07-08 ENCOUNTER — TELEPHONE (OUTPATIENT)
Age: 54
End: 2024-07-08

## 2024-07-08 DIAGNOSIS — E66.9 OBESITY, CLASS I, BMI 30-34.9: Primary | ICD-10-CM

## 2024-07-08 RX ORDER — LISDEXAMFETAMINE DIMESYLATE 60 MG/1
60 CAPSULE ORAL EVERY MORNING
Qty: 30 CAPSULE | Refills: 0 | Status: SHIPPED | OUTPATIENT
Start: 2024-07-08

## 2024-07-08 NOTE — TELEPHONE ENCOUNTER
Patient requesting a dose increase of her wegovy to be sent to [facility] pharmacy on file already. Patient tolerated previous dose without any incident. Please called patient at [phone]

## 2024-07-22 ENCOUNTER — OFFICE VISIT (OUTPATIENT)
Dept: FAMILY MEDICINE CLINIC | Facility: HOSPITAL | Age: 54
End: 2024-07-22
Payer: COMMERCIAL

## 2024-07-22 VITALS
HEART RATE: 70 BPM | DIASTOLIC BLOOD PRESSURE: 72 MMHG | TEMPERATURE: 97 F | WEIGHT: 235.6 LBS | SYSTOLIC BLOOD PRESSURE: 122 MMHG | OXYGEN SATURATION: 99 % | BODY MASS INDEX: 32.98 KG/M2 | HEIGHT: 71 IN

## 2024-07-22 DIAGNOSIS — Z00.00 ANNUAL PHYSICAL EXAM: Primary | ICD-10-CM

## 2024-07-22 DIAGNOSIS — E83.110 HEREDITARY HEMOCHROMATOSIS (HCC): ICD-10-CM

## 2024-07-22 DIAGNOSIS — Z12.31 ENCOUNTER FOR SCREENING MAMMOGRAM FOR BREAST CANCER: ICD-10-CM

## 2024-07-22 DIAGNOSIS — Z12.83 SKIN CANCER SCREENING: ICD-10-CM

## 2024-07-22 DIAGNOSIS — Z13.220 SCREENING CHOLESTEROL LEVEL: ICD-10-CM

## 2024-07-22 DIAGNOSIS — F98.8 ATTENTION DEFICIT DISORDER, UNSPECIFIED HYPERACTIVITY PRESENCE: ICD-10-CM

## 2024-07-22 PROCEDURE — 99396 PREV VISIT EST AGE 40-64: CPT | Performed by: NURSE PRACTITIONER

## 2024-07-22 NOTE — PATIENT INSTRUCTIONS
"Patient Education     Routine physical for adults   The Basics   Written by the doctors and editors at [facility]   What is a physical? -- A physical is a routine visit, or \"check-up,\" with your doctor. You might also hear it called a \"wellness visit\" or \"preventive visit.\"  During each visit, the doctor will:   Ask about your physical and mental health   Ask about your habits, behaviors, and lifestyle   Do an exam   Give you vaccines if needed   Talk to you about any medicines you take   Give advice about your health   Answer your questions  Getting regular check-ups is an important part of taking care of your health. It can help your doctor find and treat any problems you have. But it's also important for preventing health problems.  A routine physical is different from a \"sick visit.\" A sick visit is when you see a doctor because of a health concern or problem. Since physicals are scheduled ahead of time, you can think about what you want to ask the doctor.  How often should I get a physical? -- It depends on your age and health. In general, for people age 21 years and older:   If you are younger than 50 years, you might be able to get a physical every 3 years.   If you are 50 years or older, your doctor might recommend a physical every year.  If you have an ongoing health condition, like diabetes or high blood pressure, your doctor will probably want to see you more often.  What happens during a physical? -- In general, each visit will include:   Physical exam - The doctor or nurse will check your height, weight, heart rate, and blood pressure. They will also look at your eyes and ears. They will ask about how you are feeling and whether you have any symptoms that bother you.   Medicines - It's a good idea to bring a list of all the medicines you take to each doctor visit. Your doctor will talk to you about your medicines and answer any questions. Tell them if you are having any side effects that bother you. You " "should also tell them if you are having trouble paying for any of your medicines.   Habits and behaviors - This includes:   Your diet   Your exercise habits   Whether you smoke, drink alcohol, or use drugs   Whether you are sexually active   Whether you feel safe at home  Your doctor will talk to you about things you can do to improve your health and lower your risk of health problems. They will also offer help and support. For example, if you want to quit smoking, they can give you advice and might prescribe medicines. If you want to improve your diet or get more physical activity, they can help you with this, too.   Lab tests, if needed - The tests you get will depend on your age and situation. For example, your doctor might want to check your:   Cholesterol   Blood sugar   Iron level   Vaccines - The recommended vaccines will depend on your age, health, and what vaccines you already had. Vaccines are very important because they can prevent certain serious or deadly infections.   Discussion of screening - \"Screening\" means checking for diseases or other health problems before they cause symptoms. Your doctor can recommend screening based on your age, risk, and preferences. This might include tests to check for:   Cancer, such as breast, prostate, cervical, ovarian, colorectal, prostate, lung, or skin cancer   Sexually transmitted infections, such as chlamydia and gonorrhea   Mental health conditions like depression and anxiety  Your doctor will talk to you about the different types of screening tests. They can help you decide which screenings to have. They can also explain what the results might mean.   Answering questions - The physical is a good time to ask the doctor or nurse questions about your health. If needed, they can refer you to other doctors or specialists, too.  Adults older than 65 years often need other care, too. As you get older, your doctor will talk to you about:   How to prevent falling at " home   Hearing or vision tests   Memory testing   How to take your medicines safely   Making sure that you have the help and support you need at home  All topics are updated as new evidence becomes available and our peer review process is complete.  This topic retrieved from [employer] on: May 02, 2024.  Topic 870046 Version 1.0  Release: 32.4.3 - C32.122  © 2024 UpToDate, Inc. and/or its affiliates. All rights reserved.  Consumer Information Use and Disclaimer   Disclaimer: This generalized information is a limited summary of diagnosis, treatment, and/or medication information. It is not meant to be comprehensive and should be used as a tool to help the user understand and/or assess potential diagnostic and treatment options. It does NOT include all information about conditions, treatments, medications, side effects, or risks that may apply to a specific patient. It is not intended to be medical advice or a substitute for the medical advice, diagnosis, or treatment of a health care provider based on the health care provider's examination and assessment of a patient's specific and unique circumstances. Patients must speak with a health care provider for complete information about their health, medical questions, and treatment options, including any risks or benefits regarding use of medications. This information does not endorse any treatments or medications as safe, effective, or approved for treating a specific patient. UpToDate, Inc. and its affiliates disclaim any warranty or liability relating to this information or the use thereof.The use of this information is governed by the Terms of Use, available at https://www.woltersvmock.comuwer.com/en/know/clinical-effectiveness-terms. 2024© UpToDate, Inc. and its affiliates and/or licensors. All rights reserved.  Copyright   © 2024 UpToDate, Inc. and/or its affiliates. All rights reserved.

## 2024-07-22 NOTE — PROGRESS NOTES
Adult Annual Physical  Name: Pamela L Fothergill      : 1970      MRN: [id number]  Encounter Provider: NATHALIE Escalante  Encounter Date: 2024   Encounter department: [facility] [address]     Assessment & Plan   1. Annual physical exam  2. Attention deficit disorder, unspecified hyperactivity presence  Assessment & Plan:  Doing well on Vyvanse.   Continue with current dose.   F/U in 1 year.   3. Hereditary hemochromatosis (HCC)  Assessment & Plan:  Managed by hematology  4. Skin cancer screening  -     Ambulatory Referral to Dermatology; Future  5. Screening cholesterol level  -     Lipid panel; Future; Expected date: 2025  6. Encounter for screening mammogram for breast cancer  -     Mammo screening bilateral w 3d & cad; Future; Expected date: 2024    Immunizations and preventive care screenings were discussed with patient today. Appropriate education was printed on patient's after visit summary.    Counseling:  Alcohol/drug use: discussed moderation in alcohol intake, the recommendations for healthy alcohol use, and avoidance of illicit drug use.  Dental Health: discussed importance of regular tooth brushing, flossing, and dental visits.  Injury prevention: discussed safety/seat belts, safety helmets, smoke detectors, carbon dioxide detectors, and smoking near bedding or upholstery.  Sexual health: discussed sexually transmitted diseases, partner selection, use of condoms, avoidance of unintended pregnancy, and contraceptive alternatives.  Exercise: the importance of regular exercise/physical activity was discussed. Recommend exercise 3-5 times per week for at least 30 minutes.          History of Present Illness     Adult Annual Physical:  Patient presents for annual physical. Doing well with Vyvanse. Had switched to this from Adderall due to shortage. Feels it is working well enough. No AE.   She is now on Wegovy. Had been on sandexa but had trouble acquiring  "it. On 1.7 mg dose of Wegovy and just starting to lose weight. This is managed by weight management. .     Diet and Physical Activity:  - Diet/Nutrition: well balanced diet.  - Exercise: walking and strength training exercises.    Depression Screening:  - PHQ-2 Score: 0    General Health:  - Sleep: 4-6 hours of sleep on average and sleeps well.  - Hearing: normal hearing bilateral ears.  - Vision: goes for regular eye exams.  - Dental: regular dental visits.    /GYN Health:  - Follows with GYN: no.   - Menopause: postmenopausal.     Advanced Care Planning:  - Has an advanced directive?: no    - Has a durable medical POA?: no    - ACP document given to patient?: yes      Review of Systems   Constitutional: Negative.    HENT: Negative.  Negative for congestion, dental problem, ear pain, hearing loss, postnasal drip, rhinorrhea, sinus pressure, sinus pain, sore throat, tinnitus and trouble swallowing.    Eyes: Negative.    Respiratory: Negative.     Cardiovascular: Negative.    Gastrointestinal: Negative.    Endocrine: Negative.    Genitourinary: Negative.    Musculoskeletal: Negative.    Skin: Negative.    Neurological: Negative.    Hematological: Negative.    Psychiatric/Behavioral: Negative.           Objective     /72 (BP Location: Left arm, Patient Position: Sitting, Cuff Size: Standard)   Pulse 70   Temp (!) 97 °F (36.1 °C) (Tympanic)   Ht 5' 11\" (1.803 m)   Wt 107 kg (235 lb 9.6 oz)   LMP 01/01/2019 (Approximate)   SpO2 99%   BMI 32.86 kg/m²     Physical Exam  Vitals reviewed.   Constitutional:       Appearance: Normal appearance.   HENT:      Head: Normocephalic and atraumatic.      Right Ear: Tympanic membrane, ear canal and external ear normal.      Left Ear: Tympanic membrane, ear canal and external ear normal.      Nose: Nose normal.      Mouth/Throat:      Mouth: Mucous membranes are moist.      Pharynx: Oropharynx is clear.   Eyes:      Conjunctiva/sclera: Conjunctivae normal.      Pupils: " Pupils are equal, round, and reactive to light.   Cardiovascular:      Rate and Rhythm: Normal rate and regular rhythm.      Heart sounds: Normal heart sounds. No murmur heard.  Pulmonary:      Effort: Pulmonary effort is normal.      Breath sounds: Normal breath sounds.   Abdominal:      General: Abdomen is flat. Bowel sounds are normal.      Palpations: Abdomen is soft. There is no hepatomegaly or splenomegaly.      Tenderness: There is no abdominal tenderness.   Musculoskeletal:         General: Normal range of motion.      Cervical back: Normal range of motion and neck supple.   Skin:     General: Skin is warm and dry.      Capillary Refill: Capillary refill takes less than 2 seconds.   Neurological:      General: No focal deficit present.      Mental Status: She is alert and oriented to person, place, and time.   Psychiatric:         Mood and Affect: Mood normal.         Behavior: Behavior normal.         Thought Content: Thought content normal.         Judgment: Judgment normal.

## 2024-08-05 ENCOUNTER — OFFICE VISIT (OUTPATIENT)
Dept: BARIATRICS | Facility: CLINIC | Age: 54
End: 2024-08-05
Payer: COMMERCIAL

## 2024-08-05 VITALS
TEMPERATURE: 97.5 F | DIASTOLIC BLOOD PRESSURE: 75 MMHG | RESPIRATION RATE: 17 BRPM | BODY MASS INDEX: 32.7 KG/M2 | HEIGHT: 71 IN | SYSTOLIC BLOOD PRESSURE: 110 MMHG | HEART RATE: 72 BPM | WEIGHT: 233.6 LBS

## 2024-08-05 DIAGNOSIS — E66.9 OBESITY, CLASS I, BMI 30-34.9: Primary | ICD-10-CM

## 2024-08-05 DIAGNOSIS — G47.33 OSA (OBSTRUCTIVE SLEEP APNEA): ICD-10-CM

## 2024-08-05 PROCEDURE — 99214 OFFICE O/P EST MOD 30 MIN: CPT | Performed by: PHYSICIAN ASSISTANT

## 2024-08-05 NOTE — PROGRESS NOTES
Assessment/Plan:    Obesity, Class I, BMI 30-34.9  -Patient is pursuing conservative program with RD visits after VLCD   -weight currently not at goal  -CBC, CMP, iron panel from 6/10/24 reviewed.      Initial:273.4  Last visit:243.8  Current: 233.6  Change:-39.8 total (-10.2lb from last visit)  Goal:under 200    Goals:  -continue with food tracking if not losing  -continue water intake  -continue current exercise plan    To continue on wegovy 1.7mg.  tolerating it well.  5% weight loss currently on this medication. Had been on saxenda prior.        HUMPHREY (obstructive sleep apnea)  Should use CPAP regularly           Diagnoses and all orders for this visit:    Obesity, Class I, BMI 30-34.9  -     Semaglutide-Weight Management (WEGOVY) 1.7 MG/0.75ML; Inject 0.75 mL (1.7 mg total) under the skin once a week    HUMPHREY (obstructive sleep apnea)          Subjective:   Chief Complaint   Patient presents with    Follow-up     MWM-4M F/u; Waist-40in        Patient ID: Pamela L Fothergill  is a 54 y.o. female with excess weight/obesity here to pursue weight managment.  Patient is pursuing Conservative Program.     HPI  Sheis on wegovy 1.7mg.  She is feeling better now than on saxenda.  She will sometimes snack in between lunch and dinner . Not doing it in the AM much anymore because not feeling hungry.    Wt Readings from Last 10 Encounters:   08/05/24 106 kg (233 lb 9.6 oz)   07/22/24 107 kg (235 lb 9.6 oz)   06/12/24 110 kg (242 lb)   05/29/24 111 kg (244 lb)   04/01/24 111 kg (243 lb 12.8 oz)   02/16/24 110 kg (242 lb 11.2 oz)   12/15/23 111 kg (244 lb)   11/15/23 113 kg (249 lb)   11/13/23 114 kg (251 lb)   10/04/23 109 kg (240 lb 12.8 oz)       Food logging:not recently  Increased appetite/cravings:  Exercise:walking 5 x week, strength training also but not consistent  Hydration:water 64 oz daily min.            The following portions of the patient's history were reviewed and updated as appropriate: She  has a past medical  history of Class 2 severe obesity due to excess calories with serious comorbidity and body mass index (BMI) of 35.0 to 35.9 in adult (HCC), Hemochromatosis, Hereditary hemochromatosis (HCC) (4/5/2018), and IBS (irritable bowel syndrome).  She   Patient Active Problem List    Diagnosis Date Noted    Obesity, Class I, BMI 30-34.9 09/12/2022    HUMPHREY (obstructive sleep apnea)     Hereditary hemochromatosis (HCC) 04/05/2018    ADD (attention deficit disorder) 05/07/2012    Allergic rhinitis 05/07/2012     She  has a past surgical history that includes Colonoscopy; Hysteroscopy w/ endometrial ablation; Nasal septum surgery; pr corrj hlx vlgs bncty [facility] joint arthrodesis (Left, 03/19/2021); and Bunionectomy (Left, 03/2021).  Her family history includes Arrhythmia in her mother; Heart attack in her maternal aunt, maternal grandfather, and paternal grandfather; Heart disease in her father, maternal aunt, mother, paternal grandfather, and paternal grandmother; Hypertension in her mother; No Known Problems in her brother, daughter, daughter, maternal grandmother, paternal aunt, paternal aunt, and sister; Uterine cancer (age of onset: 60) in her mother.  She  reports that she quit smoking about 21 years ago. Her smoking use included cigarettes. She started smoking about 26 years ago. She has a 1 pack-year smoking history. She has never used smokeless tobacco. She reports current alcohol use. She reports that she does not use drugs.  Current Outpatient Medications   Medication Sig Dispense Refill    cetirizine (ZyrTEC) 10 mg tablet Take 10 mg by mouth daily      Cholecalciferol (VITAMIN D) 2000 units CAPS Take 1 capsule by mouth daily      Denta 5000 Plus 1.1 % CREA BRUSH ON TEETH TWICE A DAY AFTER REGULAR BRUSING      fluticasone (FLONASE) 50 mcg/act nasal spray 1 spray into each nostril daily      lisdexamfetamine (VYVANSE) 60 MG capsule Take 1 capsule (60 mg total) by mouth every morning Max Daily Amount: 60 mg 30 capsule 0  "   Multiple Vitamin (multivitamin) tablet Take 1 tablet by mouth daily      Probiotic Product (PROBIOTIC DAILY PO) Take by mouth      Semaglutide-Weight Management (WEGOVY) 1.7 MG/0.75ML Inject 0.75 mL (1.7 mg total) under the skin once a week 3 mL 3     No current facility-administered medications for this visit.     Current Outpatient Medications on File Prior to Visit   Medication Sig    cetirizine (ZyrTEC) 10 mg tablet Take 10 mg by mouth daily    Cholecalciferol (VITAMIN D) 2000 units CAPS Take 1 capsule by mouth daily    Denta 5000 Plus 1.1 % CREA BRUSH ON TEETH TWICE A DAY AFTER REGULAR BRUSING    fluticasone (FLONASE) 50 mcg/act nasal spray 1 spray into each nostril daily    lisdexamfetamine (VYVANSE) 60 MG capsule Take 1 capsule (60 mg total) by mouth every morning Max Daily Amount: 60 mg    Multiple Vitamin (multivitamin) tablet Take 1 tablet by mouth daily    Probiotic Product (PROBIOTIC DAILY PO) Take by mouth    [DISCONTINUED] Semaglutide-Weight Management (WEGOVY) 1.7 MG/0.75ML Inject 0.75 mL (1.7 mg total) under the skin once a week     No current facility-administered medications on file prior to visit.     She has No Known Allergies..    Review of Systems   Constitutional:  Negative for fever.   Respiratory:  Negative for shortness of breath.    Cardiovascular:  Negative for chest pain and palpitations.   Gastrointestinal:  Negative for abdominal pain, constipation, diarrhea and vomiting.   Genitourinary:  Negative for difficulty urinating.   Skin:  Negative for rash.   Neurological:  Negative for headaches.   Psychiatric/Behavioral:  Negative for dysphoric mood. The patient is not nervous/anxious.        Objective:    /75   Pulse 72   Temp 97.5 °F (36.4 °C)   Resp 17   Ht 5' 11\" (1.803 m)   Wt 106 kg (233 lb 9.6 oz)   LMP 01/01/2019 (Approximate)   BMI 32.58 kg/m²      Physical Exam  Vitals and nursing note reviewed.   Constitutional:       General: She is not in acute distress.     " Appearance: She is well-developed. She is obese.   HENT:      Head: Normocephalic and atraumatic.   Eyes:      Conjunctiva/sclera: Conjunctivae normal.   Neck:      Thyroid: No thyromegaly.   Pulmonary:      Effort: Pulmonary effort is normal. No respiratory distress.   Skin:     Findings: No rash (visible).   Neurological:      Mental Status: She is alert and oriented to person, place, and time.   Psychiatric:         Mood and Affect: Mood normal.         Behavior: Behavior normal.

## 2024-08-08 DIAGNOSIS — F98.8 ATTENTION DEFICIT DISORDER, UNSPECIFIED HYPERACTIVITY PRESENCE: ICD-10-CM

## 2024-08-08 RX ORDER — LISDEXAMFETAMINE DIMESYLATE 60 MG/1
60 CAPSULE ORAL EVERY MORNING
Qty: 30 CAPSULE | Refills: 0 | Status: SHIPPED | OUTPATIENT
Start: 2024-08-08

## 2024-09-05 ENCOUNTER — TELEPHONE (OUTPATIENT)
Dept: BARIATRICS | Facility: CLINIC | Age: 54
End: 2024-09-05

## 2024-09-05 NOTE — TELEPHONE ENCOUNTER
Patient return call from office. Warm transferred to [facility] for further assistance.

## 2024-09-05 NOTE — TELEPHONE ENCOUNTER
Shaun for pt to call back and confirm if she wants to come to [facility] for 9/13/24 rd appt to see Heidy or r/s for another day with Heidy at [place]

## 2024-09-06 DIAGNOSIS — F98.8 ATTENTION DEFICIT DISORDER, UNSPECIFIED HYPERACTIVITY PRESENCE: ICD-10-CM

## 2024-09-09 RX ORDER — LISDEXAMFETAMINE DIMESYLATE 60 MG/1
60 CAPSULE ORAL EVERY MORNING
Qty: 30 CAPSULE | Refills: 0 | Status: SHIPPED | OUTPATIENT
Start: 2024-09-09

## 2024-09-13 ENCOUNTER — CLINICAL SUPPORT (OUTPATIENT)
Age: 54
End: 2024-09-13

## 2024-09-13 VITALS — HEIGHT: 71 IN | WEIGHT: 227 LBS | BODY MASS INDEX: 31.78 KG/M2

## 2024-09-13 DIAGNOSIS — R63.5 ABNORMAL WEIGHT GAIN: Primary | ICD-10-CM

## 2024-09-13 PROCEDURE — RECHECK

## 2024-09-13 PROCEDURE — WMDI30

## 2024-09-13 NOTE — PROGRESS NOTES
"Weight Management Medical Nutrition Assessment   Is here for meal planning. Current wt: 227 lbs. She has lost 6.6  lbs x ~5 wks with overall loss of  46.4 Lbs since June 2022. She remains on wegovy 1.7 mg. Tolerating well. Does notice very little appetite but sets alarms as a trigger to eat. At times having a harder time getting all of her hydration. She will continue to monitor that. Also encouraged adding fruit as this typically provides some hydration as well. Has been walking and on days when it isn't nice will do strength training videos. Keep up the great work! At next visit will complete a body comp. F/u in 2 months.         Patient seen by Medical Provider in past 6 months:  yes  Requested to schedule appointment with Medical Provider: No    Anthropometric Measurements  Start Weight (#): 273.4 lbs 6/29/22    Current Weight (#): 227  TBW % Change from start weight: 16.9%  Ideal Body Weight (#): 182.6 lbs BMI 25 (5'11.7\" 157.5 lbs)  Goal Weight (#): 200 lbs  Highest:  Lowest:    Weight Loss History  Previous weight loss attempts: Commercial Programs (Weight Watchers, [employer], etc.)  Exercise  Self Created Diets (Portion Control, Healthy Food Choices, etc.)    Food and Nutrition Related History  Wake up: 4:30-5:30   Bed Time: 10    Food Recall  Snack: 6:00 Tristian's killer thin, 1 tbsp pb    Breakfast: 9-9:30 light & fit greek yogurt w/berries, 2 tbsp flax seed  Lunch: 1:00:  salad, grilled chicken/tuna, 5 buddy dressing, sometimes cheese  Snack: 3:30  skip OR popcorn  Dinner: 6:00-7:00: ~4 oz chicken/pork/hamburger/steak, veggies/salad, sometimes 1/2 cup carb  Snacks: skip OR bar    Beverages: water  Volume of beverage intake: 80 oz+    Weekends: Same  Cravings: none identified   Trouble area of day: night    Frequency of Eating out:  Varies   Food restrictions:  n/a  Cooking: self   Food Shopping: self    Physical Activity Intake  Activity: walking, fitness  workouts   Frequency: 5x/wk  3 mile walk, " occasional fitness   Physical limitations/barriers to exercise: n/a    Estimated Needs  Energy  Apurva Kaufman Energy Needs: BMR : 1721   1-2# loss weekly sedentary: 3386-2993            1-2# loss weekly lightly active: 9465-8301  Maintenance calories for sedentary activity level: 2065  Protein:  gm      (1.2-1.5g/kg IBW)  Fluid:  84 oz      (35mL/kg IBW)    Nutrition Diagnosis  Yes;    Overweight/obesity  related to Excess energy intake as evidenced by  BMI more than normative standard for age and sex (obesity-grade I 30-34.9)       Nutrition Intervention    Nutrition Prescription  Calories: 8769-4517  Protein: 125-145 gm  Carb: 100-145 gm    Meal Plan (Rick/Pro/Carb)  Snack: 200-300, 10-15, 15-25  Breakfast: 300, 35, 20  Lunch: 350,  35, 20-30  Snack: 160, 15, 18  Dinner: 450, 30-35, 25-35  Snack: 0150, 0-10     Nutrition Education:    Calorie controlled meal plan  Food logging/measuring  Hydration  fiber  Physical activity     Nutrition Counseling:  Strategies: as above    Monitoring and Evaluation:  Evaluation criteria:  Energy Intake  Meet protein needs  Maintain adequate hydration  Monitor weekly weight  Physical activity   Calorie controlled meal plan  Healthy snacks  Food logging/measuring    Barriers to learning:none  Readiness to change: Action:  (Changing behavior)    Comprehension: very good  Expected Compliance: good

## 2024-10-03 NOTE — ASSESSMENT & PLAN NOTE
-Patient is pursuing conservative program with RD visits after VLCD   -weight currently not at goal  -CBC, CMP, iron panel from 6/10/24 reviewed.      Initial:273.4  Last visit:243.8  Current: 233.6  Change:-39.8 total (-10.2lb from last visit)  Goal:under 200    Goals:  -continue with food tracking if not losing  -continue water intake  -continue current exercise plan    To continue on wegovy 1.7mg.  tolerating it well.  5% weight loss currently on this medication. Had been on saxenda prior.      
Should use CPAP regularly  
Quality 130: Documentation Of Current Medications In The Medical Record: Current Medications Documented
Detail Level: Detailed
Quality 110: Preventive Care And Screening: Influenza Immunization: Influenza Immunization not Administered because Patient Refused.

## 2024-10-06 DIAGNOSIS — F98.8 ATTENTION DEFICIT DISORDER, UNSPECIFIED HYPERACTIVITY PRESENCE: ICD-10-CM

## 2024-10-07 RX ORDER — LISDEXAMFETAMINE DIMESYLATE 60 MG/1
60 CAPSULE ORAL EVERY MORNING
Qty: 30 CAPSULE | Refills: 0 | Status: SHIPPED | OUTPATIENT
Start: 2024-10-07

## 2024-10-10 ENCOUNTER — OFFICE VISIT (OUTPATIENT)
Dept: FAMILY MEDICINE CLINIC | Facility: HOSPITAL | Age: 54
End: 2024-10-10
Payer: COMMERCIAL

## 2024-10-10 ENCOUNTER — HOSPITAL ENCOUNTER (OUTPATIENT)
Dept: RADIOLOGY | Facility: HOSPITAL | Age: 54
End: 2024-10-10
Payer: COMMERCIAL

## 2024-10-10 VITALS
HEART RATE: 78 BPM | HEIGHT: 71 IN | BODY MASS INDEX: 31.44 KG/M2 | DIASTOLIC BLOOD PRESSURE: 70 MMHG | WEIGHT: 224.6 LBS | TEMPERATURE: 97.8 F | OXYGEN SATURATION: 97 % | SYSTOLIC BLOOD PRESSURE: 122 MMHG

## 2024-10-10 DIAGNOSIS — J40 BRONCHITIS: Primary | ICD-10-CM

## 2024-10-10 DIAGNOSIS — J40 BRONCHITIS: ICD-10-CM

## 2024-10-10 PROCEDURE — 99214 OFFICE O/P EST MOD 30 MIN: CPT | Performed by: NURSE PRACTITIONER

## 2024-10-10 PROCEDURE — 71046 X-RAY EXAM CHEST 2 VIEWS: CPT

## 2024-10-10 RX ORDER — PREDNISONE 10 MG/1
TABLET ORAL
Qty: 32 TABLET | Refills: 0 | Status: SHIPPED | OUTPATIENT
Start: 2024-10-10

## 2024-10-10 NOTE — PROGRESS NOTES
"Ambulatory Visit  Name: Pamela L Fothergill      : 1970      MRN: [id number]  Encounter Provider: NATHALIE Escalante  Encounter Date: 10/10/2024   Encounter department: [facility] CARE [address]     Assessment & Plan  Bronchitis  With pneumonia exposure so will check chest xray. I do suspect this is viral.   Wheezing on exam. Start prednisone.   Pt is not interested in cough medication.   Aware cough can persist for several weeks. Call if worsening or not improving at all.   Orders:    XR chest pa and lateral; Future    predniSONE 10 mg tablet; 4 tabs x 3 days, 3 tabs x 3 days 2 tabs x 3 days 1 tab x 3 days, 1/2 tab x 3 days then stop       History of Present Illness     Over 1 week ago started with cough. Initially felt really tired. Tested for COVID twice and was negative. Not as tired any more. Bringing up discolored mucus and tastes bad. Mild nasal congestion and runny nose. Blowing out clear mucus. Has wheezing. No sob. No fever. Has chills. Granddaughter just had pneumonia.           Review of Systems   Constitutional:  Positive for chills. Negative for fever.   HENT:  Positive for congestion and rhinorrhea. Negative for ear pain and sore throat.    Respiratory:  Positive for cough and wheezing. Negative for shortness of breath.            Objective     /70 (BP Location: Left arm, Patient Position: Sitting, Cuff Size: Standard)   Pulse 78   Temp 97.8 °F (36.6 °C) (Tympanic)   Ht 5' 11\" (1.803 m)   Wt 102 kg (224 lb 9.6 oz)   LMP 2019 (Approximate)   SpO2 97%   BMI 31.33 kg/m²     Physical Exam  Vitals reviewed.   Constitutional:       General: She is not in acute distress.     Appearance: Normal appearance. She is not ill-appearing.   HENT:      Right Ear: Tympanic membrane, ear canal and external ear normal.      Left Ear: Tympanic membrane, ear canal and external ear normal.      Mouth/Throat:      Mouth: Mucous membranes are moist.      Pharynx: Oropharynx is " clear.   Cardiovascular:      Rate and Rhythm: Normal rate and regular rhythm.      Heart sounds: Normal heart sounds. No murmur heard.  Pulmonary:      Effort: Pulmonary effort is normal.      Breath sounds: Wheezing (expiratory) present.      Comments: No cough during visit  Skin:     General: Skin is warm and dry.   Neurological:      Mental Status: She is alert and oriented to person, place, and time.   Psychiatric:         Mood and Affect: Mood normal.         Behavior: Behavior normal.         Thought Content: Thought content normal.         Judgment: Judgment normal.

## 2024-11-01 ENCOUNTER — CLINICAL SUPPORT (OUTPATIENT)
Dept: BARIATRICS | Facility: CLINIC | Age: 54
End: 2024-11-01

## 2024-11-01 VITALS
DIASTOLIC BLOOD PRESSURE: 82 MMHG | SYSTOLIC BLOOD PRESSURE: 124 MMHG | BODY MASS INDEX: 31.08 KG/M2 | HEART RATE: 74 BPM | TEMPERATURE: 97.7 F | HEIGHT: 71 IN | WEIGHT: 222 LBS | RESPIRATION RATE: 16 BRPM

## 2024-11-01 VITALS — WEIGHT: 222 LBS | BODY MASS INDEX: 31.08 KG/M2 | HEIGHT: 71 IN

## 2024-11-01 DIAGNOSIS — E66.811 OBESITY, CLASS I, BMI 30-34.9: ICD-10-CM

## 2024-11-01 DIAGNOSIS — R63.5 ABNORMAL WEIGHT GAIN: Primary | ICD-10-CM

## 2024-11-01 PROCEDURE — RECHECK

## 2024-11-01 PROCEDURE — WEIGHT

## 2024-11-01 NOTE — PROGRESS NOTES
Patient last visit weight: 233lbs   Patient current visit weight: 222lbs     If you are taking phentermine or other oral weight loss medications, are you experiencing any of the following symptoms:  Headache:   Blurred Vision:   Chest Pain:   Palpitations:  Insomnia:   SPECIFY ORAL MEDICATION AND DOSAGE:     If you are taking an injectable medication,  are you experiencing any of the following symptoms:  Bloating: NO   Nausea: NO   Vomiting: NO    Constipation: NO   Diarrhea: NO   SPECIFY INJECTABLE MEDICATION AND CURRENT DOSAGE: Wegovy 1.7mg. Patient requesting refills to [facility].       Vitals:    Is BP less than 100/60? NO   Is BP greater than 140/90? NO   Is HR greater than 100? NO   **If yes to any of the above, have patient relax and repeat in 5-10 minutes**    Repeat values:    Is BP less than 100/60?  Is BP greater than 140/90?  Is HR greater than 100?  **If values remain outside of ranges above, please consult provider for next steps**

## 2024-11-05 DIAGNOSIS — F98.8 ATTENTION DEFICIT DISORDER, UNSPECIFIED HYPERACTIVITY PRESENCE: ICD-10-CM

## 2024-11-06 RX ORDER — LISDEXAMFETAMINE DIMESYLATE 60 MG/1
60 CAPSULE ORAL EVERY MORNING
Qty: 30 CAPSULE | Refills: 0 | Status: SHIPPED | OUTPATIENT
Start: 2024-11-06

## 2024-12-05 DIAGNOSIS — F98.8 ATTENTION DEFICIT DISORDER, UNSPECIFIED HYPERACTIVITY PRESENCE: ICD-10-CM

## 2024-12-05 RX ORDER — LISDEXAMFETAMINE DIMESYLATE 60 MG/1
60 CAPSULE ORAL EVERY MORNING
Qty: 30 CAPSULE | Refills: 0 | Status: SHIPPED | OUTPATIENT
Start: 2024-12-05

## 2024-12-10 ENCOUNTER — APPOINTMENT (OUTPATIENT)
Dept: LAB | Facility: HOSPITAL | Age: 54
End: 2024-12-10
Payer: COMMERCIAL

## 2024-12-10 DIAGNOSIS — E83.110 HEREDITARY HEMOCHROMATOSIS (HCC): ICD-10-CM

## 2024-12-10 LAB
ALBUMIN SERPL BCG-MCNC: 4.2 G/DL (ref 3.5–5)
ALP SERPL-CCNC: 100 U/L (ref 34–104)
ALT SERPL W P-5'-P-CCNC: 12 U/L (ref 7–52)
ANION GAP SERPL CALCULATED.3IONS-SCNC: 9 MMOL/L (ref 4–13)
AST SERPL W P-5'-P-CCNC: 18 U/L (ref 13–39)
BASOPHILS # BLD AUTO: 0.02 THOUSANDS/ÂΜL (ref 0–0.1)
BASOPHILS NFR BLD AUTO: 0 % (ref 0–1)
BILIRUB SERPL-MCNC: 1.47 MG/DL (ref 0.2–1)
BUN SERPL-MCNC: 14 MG/DL (ref 5–25)
CALCIUM SERPL-MCNC: 9.4 MG/DL (ref 8.4–10.2)
CHLORIDE SERPL-SCNC: 104 MMOL/L (ref 96–108)
CO2 SERPL-SCNC: 25 MMOL/L (ref 21–32)
CREAT SERPL-MCNC: 0.64 MG/DL (ref 0.6–1.3)
EOSINOPHIL # BLD AUTO: 0.1 THOUSAND/ÂΜL (ref 0–0.61)
EOSINOPHIL NFR BLD AUTO: 2 % (ref 0–6)
ERYTHROCYTE [DISTWIDTH] IN BLOOD BY AUTOMATED COUNT: 11.9 % (ref 11.6–15.1)
FERRITIN SERPL-MCNC: 66 NG/ML (ref 11–307)
GFR SERPL CREATININE-BSD FRML MDRD: 100 ML/MIN/1.73SQ M
GLUCOSE SERPL-MCNC: 90 MG/DL (ref 65–140)
HCT VFR BLD AUTO: 46.9 % (ref 34.8–46.1)
HGB BLD-MCNC: 15.5 G/DL (ref 11.5–15.4)
IMM GRANULOCYTES # BLD AUTO: 0.02 THOUSAND/UL (ref 0–0.2)
IMM GRANULOCYTES NFR BLD AUTO: 0 % (ref 0–2)
IRON SATN MFR SERPL: 58 % (ref 15–50)
IRON SERPL-MCNC: 160 UG/DL (ref 50–212)
LYMPHOCYTES # BLD AUTO: 2.46 THOUSANDS/ÂΜL (ref 0.6–4.47)
LYMPHOCYTES NFR BLD AUTO: 43 % (ref 14–44)
MCH RBC QN AUTO: 31.8 PG (ref 26.8–34.3)
MCHC RBC AUTO-ENTMCNC: 33 G/DL (ref 31.4–37.4)
MCV RBC AUTO: 96 FL (ref 82–98)
MONOCYTES # BLD AUTO: 0.41 THOUSAND/ÂΜL (ref 0.17–1.22)
MONOCYTES NFR BLD AUTO: 7 % (ref 4–12)
NEUTROPHILS # BLD AUTO: 2.67 THOUSANDS/ÂΜL (ref 1.85–7.62)
NEUTS SEG NFR BLD AUTO: 48 % (ref 43–75)
NRBC BLD AUTO-RTO: 0 /100 WBCS
PLATELET # BLD AUTO: 202 THOUSANDS/UL (ref 149–390)
PMV BLD AUTO: 11.2 FL (ref 8.9–12.7)
POTASSIUM SERPL-SCNC: 4.5 MMOL/L (ref 3.5–5.3)
PROT SERPL-MCNC: 6.8 G/DL (ref 6.4–8.4)
RBC # BLD AUTO: 4.87 MILLION/UL (ref 3.81–5.12)
SODIUM SERPL-SCNC: 138 MMOL/L (ref 135–147)
TIBC SERPL-MCNC: 276 UG/DL (ref 250–450)
UIBC SERPL-MCNC: 116 UG/DL (ref 155–355)
WBC # BLD AUTO: 5.68 THOUSAND/UL (ref 4.31–10.16)

## 2024-12-10 PROCEDURE — 80053 COMPREHEN METABOLIC PANEL: CPT

## 2024-12-10 PROCEDURE — 36415 COLL VENOUS BLD VENIPUNCTURE: CPT

## 2024-12-10 PROCEDURE — 85025 COMPLETE CBC W/AUTO DIFF WBC: CPT

## 2024-12-10 PROCEDURE — 82728 ASSAY OF FERRITIN: CPT

## 2024-12-10 PROCEDURE — 83550 IRON BINDING TEST: CPT

## 2024-12-10 PROCEDURE — 83540 ASSAY OF IRON: CPT

## 2024-12-11 NOTE — ASSESSMENT & PLAN NOTE
Patient has been donating blood at [facility] approximately every 3 months.    She is due to donate blood at this time and knows to schedule an appointment.    She will continue on her current blood donation schedule.  She is encouraged to stay well-hydrated.  I will see her back in 1 year with repeat labs.  She knows to call anytime with questions or concerns    Orders:    CBC and differential; Future    TIBC Panel (incl. Iron, TIBC, % Iron Saturation); Future    Ferritin; Future    Comprehensive metabolic panel; Future

## 2024-12-11 NOTE — PROGRESS NOTES
Name: Pamela L Fothergill      : 1970      MRN: [id number]  Encounter Provider: NATHALIE Neri  Encounter Date: 2024   Encounter department: [facility]  :  Assessment & Plan  Hereditary hemochromatosis (HCC)  Patient has been donating blood at [facility] approximately every 3 months.    She is due to donate blood at this time and knows to schedule an appointment.    She will continue on her current blood donation schedule.  She is encouraged to stay well-hydrated.  I will see her back in 1 year with repeat labs.  She knows to call anytime with questions or concerns    Orders:    CBC and differential; Future    TIBC Panel (incl. Iron, TIBC, % Iron Saturation); Future    Ferritin; Future    Comprehensive metabolic panel; Future        History of Present Illness   No chief complaint on file.  Pamela L Fothergill is a 54 y.o. female with hereditary hemochromatosis.  She donates blood regularly at [facility], approximately every 3 months     Jewell presents today for routine follow-up.  She last donated blood in September   Labs done prior to today's visit show increased iron saturation, 58%.  Serum ferritin 66.  Hemoglobin 15.5, hematocrit 46.9.  Remainder of CBC-D normal, CMP unremarkable.    She feels she can do a better job staying hydrated. On Wegovy for weight management.   Feels well    Pertinent Medical History   Family history of hereditary hemochromatosis  2018 MRI was consistent with slight iron overload    Review of Systems   All other systems reviewed and are negative.          Objective   LMP 2019 (Approximate)     Physical Exam  Constitutional:       General: She is not in acute distress.     Appearance: Normal appearance.   HENT:      Head: Normocephalic and atraumatic.   Eyes:      General: No scleral icterus.        Right eye: No discharge.         Left eye: No discharge.      Conjunctiva/sclera: Conjunctivae  normal.   Cardiovascular:      Rate and Rhythm: Normal rate and regular rhythm.   Pulmonary:      Effort: Pulmonary effort is normal. No respiratory distress.      Breath sounds: Normal breath sounds.   Abdominal:      General: Bowel sounds are normal. There is no distension.      Palpations: Abdomen is soft. There is no mass.      Tenderness: There is no abdominal tenderness.   Musculoskeletal:         General: Normal range of motion.   Lymphadenopathy:      Cervical: No cervical adenopathy.      Upper Body:      Right upper body: No supraclavicular, axillary or pectoral adenopathy.      Left upper body: No supraclavicular, axillary or pectoral adenopathy.   Skin:     General: Skin is warm and dry.   Neurological:      General: No focal deficit present.      Mental Status: She is alert and oriented to person, place, and time.   Psychiatric:         Mood and Affect: Mood normal.         Behavior: Behavior normal.         Labs: I have reviewed the following labs:  Results for orders placed or performed in visit on 12/10/24   CBC and differential   Result Value Ref Range    WBC 5.68 4.31 - 10.16 Thousand/uL    RBC 4.87 3.81 - 5.12 Million/uL    Hemoglobin 15.5 (H) 11.5 - 15.4 g/dL    Hematocrit 46.9 (H) 34.8 - 46.1 %    MCV 96 82 - 98 fL    MCH 31.8 26.8 - 34.3 pg    MCHC 33.0 31.4 - 37.4 g/dL    RDW 11.9 11.6 - 15.1 %    MPV 11.2 8.9 - 12.7 fL    Platelets 202 149 - 390 Thousands/uL    nRBC 0 /100 WBCs    Segmented % 48 43 - 75 %    Immature Grans % 0 0 - 2 %    Lymphocytes % 43 14 - 44 %    Monocytes % 7 4 - 12 %    Eosinophils Relative 2 0 - 6 %    Basophils Relative 0 0 - 1 %    Absolute Neutrophils 2.67 1.85 - 7.62 Thousands/µL    Absolute Immature Grans 0.02 0.00 - 0.20 Thousand/uL    Absolute Lymphocytes 2.46 0.60 - 4.47 Thousands/µL    Absolute Monocytes 0.41 0.17 - 1.22 Thousand/µL    Eosinophils Absolute 0.10 0.00 - 0.61 Thousand/µL    Basophils Absolute 0.02 0.00 - 0.10 Thousands/µL   Comprehensive  metabolic panel   Result Value Ref Range    Sodium 138 135 - 147 mmol/L    Potassium 4.5 3.5 - 5.3 mmol/L    Chloride 104 96 - 108 mmol/L    CO2 25 21 - 32 mmol/L    ANION GAP 9 4 - 13 mmol/L    BUN 14 5 - 25 mg/dL    Creatinine 0.64 0.60 - 1.30 mg/dL    Glucose 90 65 - 140 mg/dL    Calcium 9.4 8.4 - 10.2 mg/dL    AST 18 13 - 39 U/L    ALT 12 7 - 52 U/L    Alkaline Phosphatase 100 34 - 104 U/L    Total Protein 6.8 6.4 - 8.4 g/dL    Albumin 4.2 3.5 - 5.0 g/dL    Total Bilirubin 1.47 (H) 0.20 - 1.00 mg/dL    eGFR 100 ml/min/1.73sq m   TIBC Panel (incl. Iron, TIBC, % Iron Saturation)   Result Value Ref Range    Iron Saturation 58 (H) 15 - 50 %    TIBC 276 250 - 450 ug/dL    Iron 160 50 - 212 ug/dL    UIBC 116 (L) 155 - 355 ug/dL   Result Value Ref Range    Ferritin 66 11 - 307 ng/mL           Administrative Statements   I have spent a total time of 25 minutes in caring for this patient on the day of the visit/encounter including Instructions for management, Documenting in the medical record, Reviewing / ordering tests, medicine, procedures  , and Obtaining or reviewing history  .

## 2024-12-12 ENCOUNTER — OFFICE VISIT (OUTPATIENT)
Age: 54
End: 2024-12-12
Payer: COMMERCIAL

## 2024-12-12 VITALS
SYSTOLIC BLOOD PRESSURE: 126 MMHG | HEIGHT: 71 IN | DIASTOLIC BLOOD PRESSURE: 83 MMHG | HEART RATE: 73 BPM | TEMPERATURE: 97.3 F | OXYGEN SATURATION: 99 % | RESPIRATION RATE: 16 BRPM | WEIGHT: 219 LBS | BODY MASS INDEX: 30.66 KG/M2

## 2024-12-12 DIAGNOSIS — E83.110 HEREDITARY HEMOCHROMATOSIS (HCC): Primary | ICD-10-CM

## 2024-12-12 PROCEDURE — 99213 OFFICE O/P EST LOW 20 MIN: CPT | Performed by: NURSE PRACTITIONER

## 2025-01-06 DIAGNOSIS — F98.8 ATTENTION DEFICIT DISORDER, UNSPECIFIED HYPERACTIVITY PRESENCE: ICD-10-CM

## 2025-01-07 RX ORDER — LISDEXAMFETAMINE DIMESYLATE 60 MG/1
60 CAPSULE ORAL EVERY MORNING
Qty: 30 CAPSULE | Refills: 0 | Status: SHIPPED | OUTPATIENT
Start: 2025-01-07

## 2025-01-10 ENCOUNTER — CLINICAL SUPPORT (OUTPATIENT)
Dept: BARIATRICS | Facility: CLINIC | Age: 55
End: 2025-01-10

## 2025-01-10 VITALS — BODY MASS INDEX: 29.83 KG/M2 | WEIGHT: 213.1 LBS | HEIGHT: 71 IN

## 2025-01-10 DIAGNOSIS — R63.5 ABNORMAL WEIGHT GAIN: Primary | ICD-10-CM

## 2025-01-10 PROCEDURE — WEIGHT

## 2025-01-10 PROCEDURE — RECHECK

## 2025-02-04 DIAGNOSIS — F98.8 ATTENTION DEFICIT DISORDER, UNSPECIFIED HYPERACTIVITY PRESENCE: ICD-10-CM

## 2025-02-05 RX ORDER — LISDEXAMFETAMINE DIMESYLATE 60 MG/1
60 CAPSULE ORAL EVERY MORNING
Qty: 30 CAPSULE | Refills: 0 | Status: SHIPPED | OUTPATIENT
Start: 2025-02-05

## 2025-02-27 ENCOUNTER — OFFICE VISIT (OUTPATIENT)
Dept: BARIATRICS | Facility: CLINIC | Age: 55
End: 2025-02-27
Payer: COMMERCIAL

## 2025-02-27 VITALS
HEART RATE: 74 BPM | SYSTOLIC BLOOD PRESSURE: 124 MMHG | DIASTOLIC BLOOD PRESSURE: 82 MMHG | TEMPERATURE: 97.8 F | WEIGHT: 212.8 LBS | BODY MASS INDEX: 29.79 KG/M2 | RESPIRATION RATE: 16 BRPM | HEIGHT: 71 IN

## 2025-02-27 DIAGNOSIS — E66.3 OVERWEIGHT: Primary | ICD-10-CM

## 2025-02-27 DIAGNOSIS — G47.33 OSA (OBSTRUCTIVE SLEEP APNEA): ICD-10-CM

## 2025-02-27 PROCEDURE — 99214 OFFICE O/P EST MOD 30 MIN: CPT | Performed by: PHYSICIAN ASSISTANT

## 2025-02-27 RX ORDER — SEMAGLUTIDE 1.7 MG/.75ML
INJECTION, SOLUTION SUBCUTANEOUS
COMMUNITY
Start: 2025-02-21 | End: 2025-02-27

## 2025-02-27 NOTE — ASSESSMENT & PLAN NOTE
-Patient is pursuing conservative program with RD visits after VLCD   -weight currently not at goal  -CBC, CMP, iron panel from 6/10/24 reviewed.      Initial:273.4  Last visit:233.6  Current: 212.8  Change:-60.6lb   (-20.8lb from last visit)  Goal:under 200    Goals:  -continue currently diet plan  -continue water intake  -continue current exercise plan. Discussed strength training    To continue wegovy and increase to 2.4mg.  tolerating it well.  13% weight loss currently on this medication. Had been on saxenda prior.

## 2025-02-27 NOTE — PROGRESS NOTES
Assessment/Plan:    Overweight  -Patient is pursuing conservative program with RD visits after VLCD   -weight currently not at goal  -CBC, CMP, iron panel from 6/10/24 reviewed.      Initial:273.4  Last visit:233.6  Current: 212.8  Change:-60.6lb   (-20.8lb from last visit)  Goal:under 200    Goals:  -continue currently diet plan  -continue water intake  -continue current exercise plan. Discussed strength training    To continue wegovy and increase to 2.4mg.  tolerating it well.  13% weight loss currently on this medication. Had been on saxenda prior.        HUMPHREY (obstructive sleep apnea)  History of sleep apnea which should have improved with weight loss        Return in about 6 months (around 8/27/2025).   Diagnoses and all orders for this visit:    Overweight  -     Semaglutide-Weight Management (WEGOVY) 2.4 MG/0.75ML; Inject 0.75 mL (2.4 mg total) under the skin once a week    HUMPHREY (obstructive sleep apnea)  -     Semaglutide-Weight Management (WEGOVY) 2.4 MG/0.75ML; Inject 0.75 mL (2.4 mg total) under the skin once a week    Other orders  -     Discontinue: Wegovy 1.7 MG/0.75ML          Subjective:   Chief Complaint   Patient presents with    Follow-up     MWM F/u; Waist 35in        Patient ID: Pamela L Fothergill  is a 55 y.o. female with excess weight/obesity here to pursue weight managment.  Patient is pursuing Conservative Program.     HPI  On wegovy 1.7mg.  She is interested in doing the 2.4mg dose.  If not drinking enough water then she will sometimes get constipated   Wt Readings from Last 10 Encounters:   02/27/25 96.5 kg (212 lb 12.8 oz)   01/10/25 96.7 kg (213 lb 1.6 oz)   12/12/24 99.3 kg (219 lb)   11/01/24 101 kg (222 lb)   11/01/24 101 kg (222 lb)   10/10/24 102 kg (224 lb 9.6 oz)   09/13/24 103 kg (227 lb)   08/05/24 106 kg (233 lb 9.6 oz)   07/22/24 107 kg (235 lb 9.6 oz)   06/12/24 110 kg (242 lb)       Food logging:  Increased appetite/cravings:  Exercise:walking outside   Hydration:100-120 oz  water    Diet Recall:  S:toast w/Pb  B: yogurt, flax seed , berries  L:ND soup and apple or tuna/greens  D: protein, starch , vegetable  S: if feeling hungry then sill have something  The following portions of the patient's history were reviewed and updated as appropriate: She  has a past medical history of Class 2 severe obesity due to excess calories with serious comorbidity and body mass index (BMI) of 35.0 to 35.9 in adult (HCC), Hemochromatosis, Hereditary hemochromatosis (HCC) (4/5/2018), and IBS (irritable bowel syndrome).  She   Patient Active Problem List    Diagnosis Date Noted    Overweight 09/12/2022    HUMPHREY (obstructive sleep apnea)     Hereditary hemochromatosis (HCC) 04/05/2018    ADD (attention deficit disorder) 05/07/2012    Allergic rhinitis 05/07/2012     She  has a past surgical history that includes Colonoscopy; Hysteroscopy w/ endometrial ablation; Nasal septum surgery; [facility], [facility]/20[facility]); and Bunionectomy (Left, 03/2021).  Her family history includes Arrhythmia in her mother; Heart attack in her maternal aunt, maternal grandfather, and paternal grandfather; Heart disease in her father, maternal aunt, mother, paternal grandfather, and paternal grandmother; Hypertension in her mother; No Known Problems in her brother, daughter, daughter, maternal grandmother, paternal aunt, paternal aunt, and sister; Uterine cancer (age of onset: 60) in her mother.  She  reports that she quit smoking about 22 years ago. Her smoking use included cigarettes. She started smoking about 27 years ago. She has a 1 pack-year smoking history. She has never used smokeless tobacco. She reports current alcohol use. She reports that she does not use drugs.  Current Outpatient Medications   Medication Sig Dispense Refill    cetirizine (ZyrTEC) 10 mg tablet Take 10 mg by mouth daily      Cholecalciferol (VITAMIN D) 2000 units CAPS Take 1 capsule by mouth daily      Denta 5000  Plus 1.1 % CREA BRUSH ON TEETH TWICE A DAY AFTER REGULAR BRUSING      fluticasone (FLONASE) 50 mcg/act nasal spray 1 spray into each nostril daily      lisdexamfetamine (VYVANSE) 60 MG capsule Take 1 capsule (60 mg total) by mouth every morning Max Daily Amount: 60 mg 30 capsule 0    Multiple Vitamin (multivitamin) tablet Take 1 tablet by mouth daily      Probiotic Product (PROBIOTIC DAILY PO) Take by mouth      Semaglutide-Weight Management (WEGOVY) 2.4 MG/0.75ML Inject 0.75 mL (2.4 mg total) under the skin once a week 3 mL 6     No current facility-administered medications for this visit.     Current Outpatient Medications on File Prior to Visit   Medication Sig    cetirizine (ZyrTEC) 10 mg tablet Take 10 mg by mouth daily    Cholecalciferol (VITAMIN D) 2000 units CAPS Take 1 capsule by mouth daily    Denta 5000 Plus 1.1 % CREA BRUSH ON TEETH TWICE A DAY AFTER REGULAR BRUSING    fluticasone (FLONASE) 50 mcg/act nasal spray 1 spray into each nostril daily    lisdexamfetamine (VYVANSE) 60 MG capsule Take 1 capsule (60 mg total) by mouth every morning Max Daily Amount: 60 mg    Multiple Vitamin (multivitamin) tablet Take 1 tablet by mouth daily    Probiotic Product (PROBIOTIC DAILY PO) Take by mouth    [DISCONTINUED] Wegovy 1.7 MG/0.75ML     [DISCONTINUED] predniSONE 10 mg tablet 4 tabs x 3 days, 3 tabs x 3 days 2 tabs x 3 days 1 tab x 3 days, 1/2 tab x 3 days then stop     No current facility-administered medications on file prior to visit.     She has no known allergies..    Review of Systems   Constitutional:  Negative for fever.   Respiratory:  Negative for shortness of breath.    Cardiovascular:  Negative for chest pain and palpitations.   Gastrointestinal:  Negative for abdominal pain, constipation, diarrhea and vomiting.   Genitourinary:  Negative for difficulty urinating.   Skin:  Negative for rash.   Neurological:  Negative for headaches.   Psychiatric/Behavioral:  Negative for dysphoric mood. The patient is  "not nervous/anxious.        Objective:    /82 (BP Location: Left arm, Patient Position: Sitting)   Pulse 74   Temp 97.8 °F (36.6 °C) (Tympanic)   Resp 16   Ht 5' 11\" (1.803 m)   Wt 96.5 kg (212 lb 12.8 oz)   LMP 01/01/2019 (Approximate)   BMI 29.68 kg/m²      Physical Exam  Vitals and nursing note reviewed.   Constitutional:       General: She is not in acute distress.     Appearance: She is well-developed.      Comments: Overweight     HENT:      Head: Normocephalic and atraumatic.   Eyes:      Conjunctiva/sclera: Conjunctivae normal.   Neck:      Thyroid: No thyromegaly.   Pulmonary:      Effort: Pulmonary effort is normal. No respiratory distress.   Skin:     Findings: No rash (visible).   Neurological:      Mental Status: She is alert and oriented to person, place, and time.   Psychiatric:         Mood and Affect: Mood normal.         Behavior: Behavior normal.        "

## 2025-03-04 DIAGNOSIS — F98.8 ATTENTION DEFICIT DISORDER, UNSPECIFIED HYPERACTIVITY PRESENCE: ICD-10-CM

## 2025-03-04 RX ORDER — LISDEXAMFETAMINE DIMESYLATE 60 MG/1
60 CAPSULE ORAL EVERY MORNING
Qty: 30 CAPSULE | Refills: 0 | Status: SHIPPED | OUTPATIENT
Start: 2025-03-04

## 2025-04-03 DIAGNOSIS — F98.8 ATTENTION DEFICIT DISORDER, UNSPECIFIED HYPERACTIVITY PRESENCE: ICD-10-CM

## 2025-04-04 ENCOUNTER — CLINICAL SUPPORT (OUTPATIENT)
Dept: BARIATRICS | Facility: CLINIC | Age: 55
End: 2025-04-04

## 2025-04-04 VITALS — WEIGHT: 210.5 LBS | HEIGHT: 71 IN | BODY MASS INDEX: 29.47 KG/M2

## 2025-04-04 DIAGNOSIS — R63.5 ABNORMAL WEIGHT GAIN: Primary | ICD-10-CM

## 2025-04-04 PROCEDURE — RECHECK

## 2025-04-04 PROCEDURE — WEIGHT

## 2025-04-07 DIAGNOSIS — R41.840 ATTENTION OR CONCENTRATION DEFICIT: ICD-10-CM

## 2025-04-07 RX ORDER — LISDEXAMFETAMINE DIMESYLATE 60 MG/1
60 CAPSULE ORAL EVERY MORNING
Qty: 30 CAPSULE | Refills: 0 | Status: SHIPPED | OUTPATIENT
Start: 2025-04-07

## 2025-04-07 RX ORDER — LISDEXAMFETAMINE DIMESYLATE 60 MG/1
60 CAPSULE ORAL EVERY MORNING
Qty: 30 CAPSULE | Refills: 0 | OUTPATIENT
Start: 2025-04-07

## 2025-05-04 DIAGNOSIS — R41.840 ATTENTION OR CONCENTRATION DEFICIT: ICD-10-CM

## 2025-05-05 RX ORDER — LISDEXAMFETAMINE DIMESYLATE 60 MG/1
60 CAPSULE ORAL EVERY MORNING
Qty: 30 CAPSULE | Refills: 0 | Status: SHIPPED | OUTPATIENT
Start: 2025-05-05

## 2025-06-04 DIAGNOSIS — R41.840 ATTENTION OR CONCENTRATION DEFICIT: ICD-10-CM

## 2025-06-04 RX ORDER — LISDEXAMFETAMINE DIMESYLATE 60 MG/1
60 CAPSULE ORAL EVERY MORNING
Qty: 30 CAPSULE | Refills: 0 | Status: SHIPPED | OUTPATIENT
Start: 2025-06-04

## 2025-07-07 DIAGNOSIS — R41.840 ATTENTION OR CONCENTRATION DEFICIT: ICD-10-CM

## 2025-07-07 RX ORDER — LISDEXAMFETAMINE DIMESYLATE 60 MG/1
60 CAPSULE ORAL EVERY MORNING
Qty: 30 CAPSULE | Refills: 0 | Status: SHIPPED | OUTPATIENT
Start: 2025-07-07

## 2025-07-17 ENCOUNTER — RA CDI HCC (OUTPATIENT)
Dept: OTHER | Facility: HOSPITAL | Age: 55
End: 2025-07-17

## 2025-07-18 ENCOUNTER — APPOINTMENT (OUTPATIENT)
Dept: LAB | Facility: HOSPITAL | Age: 55
End: 2025-07-18
Payer: COMMERCIAL

## 2025-07-18 DIAGNOSIS — Z13.220 SCREENING CHOLESTEROL LEVEL: ICD-10-CM

## 2025-07-18 LAB
CHOLEST SERPL-MCNC: 155 MG/DL (ref ?–200)
HDLC SERPL-MCNC: 57 MG/DL
LDLC SERPL CALC-MCNC: 84 MG/DL (ref 0–100)
NONHDLC SERPL-MCNC: 98 MG/DL
TRIGL SERPL-MCNC: 71 MG/DL (ref ?–150)

## 2025-07-18 PROCEDURE — 36415 COLL VENOUS BLD VENIPUNCTURE: CPT

## 2025-07-18 PROCEDURE — 80061 LIPID PANEL: CPT

## 2025-07-23 ENCOUNTER — OFFICE VISIT (OUTPATIENT)
Dept: FAMILY MEDICINE CLINIC | Facility: HOSPITAL | Age: 55
End: 2025-07-23
Payer: COMMERCIAL

## 2025-07-23 VITALS
WEIGHT: 199.8 LBS | HEART RATE: 80 BPM | HEIGHT: 71 IN | DIASTOLIC BLOOD PRESSURE: 72 MMHG | BODY MASS INDEX: 27.97 KG/M2 | TEMPERATURE: 98.2 F | OXYGEN SATURATION: 99 % | SYSTOLIC BLOOD PRESSURE: 116 MMHG

## 2025-07-23 DIAGNOSIS — R41.840 ATTENTION OR CONCENTRATION DEFICIT: ICD-10-CM

## 2025-07-23 DIAGNOSIS — Z12.31 ENCOUNTER FOR SCREENING MAMMOGRAM FOR BREAST CANCER: ICD-10-CM

## 2025-07-23 DIAGNOSIS — Z00.00 ANNUAL PHYSICAL EXAM: Primary | ICD-10-CM

## 2025-07-23 PROCEDURE — 99396 PREV VISIT EST AGE 40-64: CPT | Performed by: NURSE PRACTITIONER

## 2025-07-23 RX ORDER — LISDEXAMFETAMINE DIMESYLATE 60 MG/1
60 CAPSULE ORAL EVERY MORNING
Qty: 30 CAPSULE | Refills: 0 | Status: SHIPPED | OUTPATIENT
Start: 2025-07-23

## 2025-07-23 NOTE — ASSESSMENT & PLAN NOTE
Continue on current dose.   F/U yearly.   Since she will be going on vacation I will fill Vyvanse 1 week early.   Orders:    lisdexamfetamine (VYVANSE) 60 MG capsule; Take 1 capsule (60 mg total) by mouth every morning Max Daily Amount: 60 mg

## 2025-07-23 NOTE — PROGRESS NOTES
Adult Annual Physical  Name: Pamela L Fothergill      : 1970      MRN: [id number]  Encounter Provider: NATHALIE Escalante  Encounter Date: 2025   Encounter department: [facility] CARE [address]     :  Assessment & Plan  Annual physical exam         Encounter for screening mammogram for breast cancer    Orders:    Mammo screening bilateral w 3d and cad; Future    Attention or concentration deficit  Continue on current dose.   F/U yearly.   Since she will be going on vacation I will fill Vyvanse 1 week early.   Orders:    lisdexamfetamine (VYVANSE) 60 MG capsule; Take 1 capsule (60 mg total) by mouth every morning Max Daily Amount: 60 mg        Preventive Screenings:  - Diabetes Screening: screening up-to-date  - Cholesterol Screening: screening up-to-date   - HIV screening: screening up-to-date   - Breast cancer screening: screening up-to-date   - Colon cancer screening: screening up-to-date   - Lung cancer screening: screening not indicated     Immunizations:  - Immunizations due: Prevnar 20 and Zoster (Shingrix)  - The patient declines recommended vaccines currently despite my recommendations      Counseling/Anticipatory Guidance:    - Diet: discussed recommendations for a healthy/well-balanced diet.   - Exercise: the importance of regular exercise/physical activity was discussed. Recommend exercise 3-5 times per week for at least 30 minutes.   - Injury prevention: discussed safety/seat belts, safety helmets, smoke detectors, carbon monoxide detectors, and smoking near bedding or upholstery.          History of Present Illness     Adult Annual Physical:  Patient presents for annual physical. Doing well. Vyvanse if working.   Continues to lose weight on Wegovy. .     Diet and Physical Activity:  - Diet/Nutrition: well balanced diet and portion control.  - Exercise: walking, 5-7 times a week on average and 30-60 minutes on average.    Depression Screening:  - PHQ-2 Score: 0    General  "Health:  - Sleep: 7-8 hours of sleep on average and sleeps well.  - Hearing: normal hearing bilateral ears.  - Vision: wears contacts, no vision problems and most recent eye exam < 1 year ago.  - Dental: regular dental visits, brushes teeth twice daily and floss regularly.    /GYN Health:  - Follows with GYN: yes.   - Menopause: postmenopausal.   - Contraception: menopause.      Advanced Care Planning:  - Has an advanced directive?: no    - ACP document given to patient?: yes      Review of Systems   Constitutional: Negative.    HENT: Negative.     Eyes: Negative.    Respiratory: Negative.     Cardiovascular: Negative.    Gastrointestinal: Negative.    Endocrine: Negative.    Genitourinary: Negative.    Musculoskeletal: Negative.    Skin: Negative.    Neurological: Negative.    Hematological: Negative.    Psychiatric/Behavioral: Negative.           Objective   /72   Pulse 80   Temp 98.2 °F (36.8 °C)   Ht 5' 11\" (1.803 m)   Wt 90.6 kg (199 lb 12.8 oz)   LMP 01/01/2019 (Approximate)   SpO2 99%   BMI 27.87 kg/m²     Physical Exam  Vitals reviewed.   Constitutional:       Appearance: Normal appearance.   HENT:      Head: Normocephalic and atraumatic.      Right Ear: Tympanic membrane, ear canal and external ear normal.      Left Ear: Tympanic membrane, ear canal and external ear normal.      Nose: Nose normal.      Mouth/Throat:      Mouth: Mucous membranes are moist.      Pharynx: Oropharynx is clear.     Eyes:      Conjunctiva/sclera: Conjunctivae normal.      Pupils: Pupils are equal, round, and reactive to light.     Neck:      Thyroid: No thyromegaly.     Cardiovascular:      Rate and Rhythm: Normal rate and regular rhythm.      Heart sounds: Normal heart sounds. No murmur heard.  Pulmonary:      Effort: Pulmonary effort is normal.      Breath sounds: Normal breath sounds.   Abdominal:      General: Abdomen is flat. Bowel sounds are normal.      Palpations: Abdomen is soft. There is no hepatomegaly " or splenomegaly.      Tenderness: There is no abdominal tenderness.     Musculoskeletal:         General: Normal range of motion.      Cervical back: Normal range of motion and neck supple.     Skin:     General: Skin is warm and dry.      Capillary Refill: Capillary refill takes less than 2 seconds.     Neurological:      General: No focal deficit present.      Mental Status: She is alert and oriented to person, place, and time.     Psychiatric:         Mood and Affect: Mood normal.         Behavior: Behavior normal.         Thought Content: Thought content normal.         Judgment: Judgment normal.

## 2025-07-23 NOTE — PATIENT INSTRUCTIONS
"Patient Education     Routine physical for adults   The Basics   Written by the doctors and editors at [facility]   What is a physical? -- A physical is a routine visit, or \"check-up,\" with your doctor. You might also hear it called a \"wellness visit\" or \"preventive visit.\"  During each visit, the doctor will:   Ask about your physical and mental health   Ask about your habits, behaviors, and lifestyle   Do an exam   Give you vaccines if needed   Talk to you about any medicines you take   Give advice about your health   Answer your questions  Getting regular check-ups is an important part of taking care of your health. It can help your doctor find and treat any problems you have. But it's also important for preventing health problems.  A routine physical is different from a \"sick visit.\" A sick visit is when you see a doctor because of a health concern or problem. Since physicals are scheduled ahead of time, you can think about what you want to ask the doctor.  How often should I get a physical? -- It depends on your age and health. In general, for people age 21 years and older:   If you are younger than 50 years, you might be able to get a physical every 3 years.   If you are 50 years or older, your doctor might recommend a physical every year.  If you have an ongoing health condition, like diabetes or high blood pressure, your doctor will probably want to see you more often.  What happens during a physical? -- In general, each visit will include:   Physical exam - The doctor or nurse will check your height, weight, heart rate, and blood pressure. They will also look at your eyes and ears. They will ask about how you are feeling and whether you have any symptoms that bother you.   Medicines - It's a good idea to bring a list of all the medicines you take to each doctor visit. Your doctor will talk to you about your medicines and answer any questions. Tell them if you are having any side effects that bother you. You " "should also tell them if you are having trouble paying for any of your medicines.   Habits and behaviors - This includes:   Your diet   Your exercise habits   Whether you smoke, drink alcohol, or use drugs   Whether you are sexually active   Whether you feel safe at home  Your doctor will talk to you about things you can do to improve your health and lower your risk of health problems. They will also offer help and support. For example, if you want to quit smoking, they can give you advice and might prescribe medicines. If you want to improve your diet or get more physical activity, they can help you with this, too.   Lab tests, if needed - The tests you get will depend on your age and situation. For example, your doctor might want to check your:   Cholesterol   Blood sugar   Iron level   Vaccines - The recommended vaccines will depend on your age, health, and what vaccines you already had. Vaccines are very important because they can prevent certain serious or deadly infections.   Discussion of screening - \"Screening\" means checking for diseases or other health problems before they cause symptoms. Your doctor can recommend screening based on your age, risk, and preferences. This might include tests to check for:   Cancer, such as breast, prostate, cervical, ovarian, colorectal, prostate, lung, or skin cancer   Sexually transmitted infections, such as chlamydia and gonorrhea   Mental health conditions like depression and anxiety  Your doctor will talk to you about the different types of screening tests. They can help you decide which screenings to have. They can also explain what the results might mean.   Answering questions - The physical is a good time to ask the doctor or nurse questions about your health. If needed, they can refer you to other doctors or specialists, too.  Adults older than 65 years often need other care, too. As you get older, your doctor will talk to you about:   How to prevent falling at " home   Hearing or vision tests   Memory testing   How to take your medicines safely   Making sure that you have the help and support you need at home  All topics are updated as new evidence becomes available and our peer review process is complete.  This topic retrieved from [employer] on: May 02, 2024.  Topic 798926 Version 1.0  Release: 32.4.3 - C32.122  © 2024 [employer] and/or its affiliates. All rights reserved.  Consumer Information Use and Disclaimer   Disclaimer: This generalized information is a limited summary of diagnosis, treatment, and/or medication information. It is not meant to be comprehensive and should be used as a tool to help the user understand and/or assess potential diagnostic and treatment options. It does NOT include all information about conditions, treatments, medications, side effects, or risks that may apply to a specific patient. It is not intended to be medical advice or a substitute for the medical advice, diagnosis, or treatment of a health care provider based on the health care provider's examination and assessment of a patient's specific and unique circumstances. Patients must speak with a health care provider for complete information about their health, medical questions, and treatment options, including any risks or benefits regarding use of medications. This information does not endorse any treatments or medications as safe, effective, or approved for treating a specific patient. [employer] and its affiliates disclaim any warranty or liability relating to this information or the use thereof.The use of this information is governed by the Terms of Use, available at https://www.woltersAmbatureuwer.com/en/know/clinical-effectiveness-terms. 2024© [employer] and its affiliates and/or licensors. All rights reserved.  Copyright   © 2024 [employer] and/or its affiliates. All rights reserved.

## 2025-08-14 ENCOUNTER — OFFICE VISIT (OUTPATIENT)
Dept: BARIATRICS | Facility: CLINIC | Age: 55
End: 2025-08-14
Payer: COMMERCIAL

## (undated) DEVICE — 3M™ STERI-STRIP™ REINFORCED ADHESIVE SKIN CLOSURES, R1547, 1/2 IN X 4 IN (12 MM X 100 MM), 6 STRIPS/ENVELOPE: Brand: 3M™ STERI-STRIP™

## (undated) DEVICE — COBAN 4 IN STERILE

## (undated) DEVICE — GLOVE SRG BIOGEL 6.5

## (undated) DEVICE — KERLIX BANDAGE ROLL: Brand: KERLIX

## (undated) DEVICE — GLOVE INDICATOR PI UNDERGLOVE SZ 6.5 BLUE

## (undated) DEVICE — GLOVE INDICATOR PI UNDERGLOVE SZ 8 BLUE

## (undated) DEVICE — SUT VICRYL 3-0 SH 27 IN J416H

## (undated) DEVICE — SUT VICRYL 4-0 PS-2 27 IN J426H

## (undated) DEVICE — ASTOUND STANDARD SURGICAL GOWN, XL: Brand: CONVERTORS

## (undated) DEVICE — SPLINT ORTHO-GLASS 4IN X 15FT

## (undated) DEVICE — CAST PADDING 4 IN SYNTHETIC NON-STRL

## (undated) DEVICE — NEEDLE 18 G X 1 1/2

## (undated) DEVICE — PENCIL ELECTROSURG E-Z CLEAN -0035H

## (undated) DEVICE — GLOVE SRG BIOGEL 7.5

## (undated) DEVICE — SUT VICRYL 3-0 REEL 54 IN J285G

## (undated) DEVICE — BETHLEHEM UNIVERSAL  MIONR EXT: Brand: CARDINAL HEALTH

## (undated) DEVICE — CHLORAPREP HI-LITE 26ML ORANGE

## (undated) DEVICE — 4-PORT MANIFOLD: Brand: NEPTUNE 2

## (undated) DEVICE — 10FR FRAZIER SUCTION HANDLE: Brand: CARDINAL HEALTH

## (undated) DEVICE — GLOVE INDICATOR PI UNDERGLOVE SZ 7.5 BLUE

## (undated) DEVICE — BLADE SAW 11 X 40MM LAPIPLASTY STRYKER

## (undated) DEVICE — ACE WRAP 4 IN UNSTERILE

## (undated) DEVICE — SUT VICRYL 2-0 SH 27 IN UNDYED J417H

## (undated) DEVICE — CUFF TOURNIQUET 18 X 4 IN QUICK CONNECT DISP 1 BLADDER

## (undated) DEVICE — GLOVE SRG BIOGEL 8

## (undated) DEVICE — CURITY NON-ADHERENT STRIPS: Brand: CURITY

## (undated) DEVICE — GLOVE SRG BIOGEL 7

## (undated) DEVICE — TUBING SUCTION 5MM X 12 FT

## (undated) DEVICE — NEEDLE 25G X 1 1/2

## (undated) DEVICE — SYRINGE 10ML LL

## (undated) DEVICE — STRETCH BANDAGE: Brand: CURITY

## (undated) DEVICE — INTENDED FOR TISSUE SEPARATION, AND OTHER PROCEDURES THAT REQUIRE A SHARP SURGICAL BLADE TO PUNCTURE OR CUT.: Brand: BARD-PARKER ® CARBON RIB-BACK BLADES